# Patient Record
Sex: FEMALE | Race: WHITE | Employment: OTHER | URBAN - METROPOLITAN AREA
[De-identification: names, ages, dates, MRNs, and addresses within clinical notes are randomized per-mention and may not be internally consistent; named-entity substitution may affect disease eponyms.]

---

## 2017-01-23 DIAGNOSIS — M81.0 OSTEOPOROSIS: ICD-10-CM

## 2017-01-23 NOTE — TELEPHONE ENCOUNTER
Requested Prescriptions     Pending Prescriptions Disp Refills    alendronate (FOSAMAX) 70 mg tablet 4 Tab 1     Sig: Take 1 Tab by mouth every seven (7) days.  calcium-cholecalciferol, d3, 600 mg calcium- 200 unit cap 60 Cap 11     Sig: One tablet by mouth twice daily. Completely out. Patient is requesting a 90 day supply.

## 2017-01-23 NOTE — TELEPHONE ENCOUNTER
Return call made to Pt using two identifiers. Pt made aware that she has refills remaining  on the Calcium-Choleclciferol at [facility] and the Fosamax I will pend to the provider. Pt stated it can wait until tomorrow and wanted that sent to [facility] because it was cheaper there.

## 2017-01-24 RX ORDER — ALENDRONATE SODIUM 70 MG/1
70 TABLET ORAL
Qty: 4 TAB | Refills: 1 | OUTPATIENT
Start: 2017-01-24

## 2017-01-24 NOTE — TELEPHONE ENCOUNTER
1/24/2017  3:52 PM    Chief Complaint   Patient presents with    Medication Refill       Noted request for Calcium with Vit D and Fosamax. Year supply of Calcium with Vit D sent 6/2016- no further action required- LPN informed patient. Fosamax sent in for month supply with refill on 1/4/2017. Called [facility] at this time and LM informing pharmacist that Rx was sent 1/4 with a refill. Requested call back from pharmacist if there is an issue with the medication Rx. Requested that pharmacist fill Rx if not an issue.

## 2017-01-25 ENCOUNTER — PATIENT OUTREACH (OUTPATIENT)
Dept: FAMILY MEDICINE CLINIC | Age: 72
End: 2017-01-25

## 2017-01-25 ENCOUNTER — TELEPHONE (OUTPATIENT)
Dept: FAMILY MEDICINE CLINIC | Age: 72
End: 2017-01-25

## 2017-01-25 DIAGNOSIS — Z12.11 COLON CANCER SCREENING: Primary | ICD-10-CM

## 2017-01-25 NOTE — ACP (ADVANCE CARE PLANNING)
NN Screening Reminders:    Spoke with Mrs. Bhumi Castro regarding colonoscopy screening. Patient freely admitted she has never had colonoscopy but agrees it is time to be screened. I have placed an order to [facility] office and Prudence Boyce has received the order. Mrs. Bhumi Castro had no questions @ this time. I did explain that it may be February or March before she is contacted for the initial visit and she agreed that was fine.

## 2017-02-01 ENCOUNTER — TELEPHONE (OUTPATIENT)
Dept: FAMILY MEDICINE CLINIC | Age: 72
End: 2017-02-01

## 2017-02-01 DIAGNOSIS — M81.0 OSTEOPOROSIS: ICD-10-CM

## 2017-02-01 DIAGNOSIS — E11.9 TYPE 2 DIABETES MELLITUS WITHOUT COMPLICATION, WITHOUT LONG-TERM CURRENT USE OF INSULIN (HCC): ICD-10-CM

## 2017-02-01 DIAGNOSIS — I10 ESSENTIAL HYPERTENSION: Primary | ICD-10-CM

## 2017-02-02 ENCOUNTER — OFFICE VISIT (OUTPATIENT)
Dept: FAMILY MEDICINE CLINIC | Age: 72
End: 2017-02-02

## 2017-02-02 VITALS
OXYGEN SATURATION: 97 % | BODY MASS INDEX: 20.66 KG/M2 | DIASTOLIC BLOOD PRESSURE: 64 MMHG | HEIGHT: 64 IN | WEIGHT: 121 LBS | SYSTOLIC BLOOD PRESSURE: 127 MMHG | RESPIRATION RATE: 20 BRPM | TEMPERATURE: 98.3 F | HEART RATE: 96 BPM

## 2017-02-02 DIAGNOSIS — M81.0 OSTEOPOROSIS: ICD-10-CM

## 2017-02-02 DIAGNOSIS — Z12.12 SCREENING FOR COLORECTAL CANCER: ICD-10-CM

## 2017-02-02 DIAGNOSIS — Z12.11 SCREENING FOR COLORECTAL CANCER: ICD-10-CM

## 2017-02-02 DIAGNOSIS — I10 ESSENTIAL HYPERTENSION WITH GOAL BLOOD PRESSURE LESS THAN 130/80: Primary | ICD-10-CM

## 2017-02-02 DIAGNOSIS — E11.9 TYPE 2 DIABETES MELLITUS WITHOUT COMPLICATION, WITHOUT LONG-TERM CURRENT USE OF INSULIN (HCC): ICD-10-CM

## 2017-02-02 RX ORDER — GLIPIZIDE 5 MG/1
5 TABLET ORAL DAILY
Qty: 90 TAB | Refills: 1 | Status: SHIPPED | OUTPATIENT
Start: 2017-02-02 | End: 2017-08-16 | Stop reason: SDUPTHER

## 2017-02-02 RX ORDER — METFORMIN HYDROCHLORIDE 1000 MG/1
1000 TABLET ORAL 2 TIMES DAILY WITH MEALS
Qty: 180 TAB | Refills: 1 | Status: SHIPPED | OUTPATIENT
Start: 2017-02-02 | End: 2017-08-16 | Stop reason: SDUPTHER

## 2017-02-02 RX ORDER — LOSARTAN POTASSIUM 25 MG/1
25 TABLET ORAL DAILY
Qty: 90 TAB | Refills: 1 | Status: SHIPPED | OUTPATIENT
Start: 2017-02-02 | End: 2017-08-16 | Stop reason: SDUPTHER

## 2017-02-02 NOTE — PROGRESS NOTES
HISTORY OF PRESENT ILLNESS  Dari Pham is a 66 y.o. female. 2/2/2017  10:08 AM    Chief Complaint   Patient presents with    Follow Up Chronic Condition     pt here for follow up chronic condition DM, HTN, Osetoarthritis       HPI: Here today for follow up on chronic conditions. No recent labs done. Follows with eye doctor. HTN: Taking Losartan as prescribed. Does not check BP at home regularly. No complaints of headaches, dizziness, chest pain, or leg swelling. Diabetes: Checking sugars at home- reports glucose <120 in the AM and around 150 during meal times. Currently on Glipizide and Metformin. Does attempt to eat ADA diet at home. No LDL on file. Not on statin, is on ACE. Does not follow with endocrine. Does follow with optometry. Last HgBA1c controlled. Osteoporosis: Has been prescribed Fosamax in the past- recently has been set up for Prolia injections instead- first injection set for 2/27. Reports bone and muscle pain with Fosamax. Taking Calcium and Vit D. Review of Systems   Constitutional: Negative for chills, fever and malaise/fatigue. Respiratory: Negative for cough, shortness of breath and wheezing. Cardiovascular: Negative for chest pain, palpitations and leg swelling. Gastrointestinal: Negative for abdominal pain, diarrhea, nausea and vomiting. Neurological: Negative for dizziness and headaches.         PHQ Screening   PHQ 2 / 9, over the last two weeks 2/2/2017   Little interest or pleasure in doing things Not at all   Feeling down, depressed or hopeless Not at all   Total Score PHQ 2 0         History  Past Medical History   Diagnosis Date    Diabetes ([facility] [address]     Hypertension     Macular degeneration 9/2015    Osteoporosis 9/2015     on Fosamax       Past Surgical History   Procedure Laterality Date    Hx refractive surgery  12/2016     right eye       Social History     Social History    Marital status:      Spouse name: N/A    Number of children: N/A  Years of education: N/A     Occupational History    Not on file. Social History Main Topics    Smoking status: Never Smoker    Smokeless tobacco: Not on file    Alcohol use No    Drug use: No    Sexual activity: Not Currently     Other Topics Concern    Not on file     Social History Narrative       Family History   Problem Relation Age of Onset    Heart Disease Mother     Heart Disease Father        No Known Allergies    Current Outpatient Prescriptions   Medication Sig Dispense Refill    alendronate (FOSAMAX) 70 mg tablet Take 1 Tab by mouth every seven (7) days. 4 Tab 1    metFORMIN (GLUCOPHAGE) 1,000 mg tablet TAKE 1 TABLET BY MOUTH TWICE DAILY WITH MEALS 180 Tab 0    losartan (COZAAR) 25 mg tablet Take 1 Tab by mouth daily. 90 Tab 0    glipiZIDE (GLUCOTROL) 5 mg tablet Take 1 Tab by mouth daily. 90 Tab 0    calcium-cholecalciferol, d3, 600 mg calcium- 200 unit cap One tablet by mouth twice daily. 60 Cap 11    denosumab (PROLIA) 60 mg/mL injection 1 mL by SubCUTAneous route every 6 months. To be done at [facility]. 1 Syringe 1    OTHER Glucometer kit, meter, strips, lancets, alcohol wipes. Check and record glucose before meals and before bedtime. 1 Each 0       Health Maintenance and Screenings  *Medicare Wellness 11/2/2016    Updates:     Colon Cancer: Scheduled for colonoscopy nurse visit during today's office visit- scheduled for 2/21 at [facility]:   Patient was offered the opportunity to discuss advance care planning NO   Does patient have an Advance Directive:  NO   If no, did you provide information on Caring Connections? Patient Care Team:  Patient Care Team:  Lazarus Galvan NP as PCP - General (Nurse Practitioner)  Mason Ngo MD (Ophthalmology)        LABS:  None new to review    RADIOLOGY:  None new to review      Physical Exam   Constitutional: She is oriented to person, place, and time.  She appears well-developed and well-nourished. No distress. Cardiovascular: Normal rate, regular rhythm and normal heart sounds. No murmur heard. Pulmonary/Chest: Effort normal and breath sounds normal. No respiratory distress. Abdominal: Soft. Bowel sounds are normal. There is no tenderness. Neurological: She is alert and oriented to person, place, and time. Skin: Skin is warm and dry. Diabetic foot exam: 2+ symmetric DP, no open areas or skin breakdown noted, sensory intact to filament and positioning. Vibratory sensation not tested. Nails in good condition. Vitals:    02/02/17 0953   BP: 127/64   Pulse: 96   Resp: 20   Temp: 98.3 °F (36.8 °C)   TempSrc: Oral   SpO2: 97%   Weight: 121 lb (54.9 kg)   Height: 5' 4\" (1.626 m)   PainSc:   9   PainLoc: Generalized         ASSESSMENT and Tereza Zacarias was seen today for follow up chronic condition. Diagnoses and all orders for this visit:    Essential hypertension with goal blood pressure less than 130/80  *Continue current medication. Controlled. Labs ordered previously- encouraged to have done. -     losartan (COZAAR) 25 mg tablet; Take 1 Tab by mouth daily. Type 2 diabetes mellitus without complication, without long-term current use of insulin ([facility])  *Continue current medication. Controlled A1c. Encouraged to have labs done as previously ordered- add Microalbumin to labs. -     metFORMIN (GLUCOPHAGE) 1,000 mg tablet; Take 1 Tab by mouth two (2) times daily (with meals). -     glipiZIDE (GLUCOTROL) 5 mg tablet; Take 1 Tab by mouth daily.  -     MICROALBUMIN, UR, RAND W/ MICROALBUMIN/CREA RATIO; Future  -      DIABETES FOOT EXAM    Osteoporosis  *Continue with Prolia injection appointment. Has been discussed previously. Screening for colorectal cancer  *While patient in office, appointment made for colonoscopy nurse appointment. Scheduled for 2/21. *Plan of care reviewed with patient.  Patient in agreement with plan and expresses understanding. All questions answered and patient encouraged to call or RTO if further questions or concerns. Follow-up Disposition:  Return in about 3 months (around 5/2/2017) for chronic disease followup.

## 2017-02-02 NOTE — MR AVS SNAPSHOT
Visit Information Date & Time Provider Department Dept. Phone Encounter #  
 2/2/2017  9:15 AM TOBY Barrow [phone] [id number] Your Appointments 2/21/2017 10:00 AM  
COLON SCREEN with [facility] NURSE VISIT [facility] ([address]) Appt Note: NP Hung referrfing [phone]; NP [place] [phone]  
 [address] [facility] [address] [place] [address] [facility] [address] [place] [address]  
  
    
 11/8/2017 10:30 AM  
Office Visit with TOBY Barrow ([address]) Appt Note: Peri Barbosa [phone]  
[facility] [id number] Upcoming Health Maintenance Date Due COLONOSCOPY 2/15/1962 DTaP/Tdap/Td series (1 - Tdap) 2/15/1965 ZOSTER VACCINE AGE 60> 2/15/2004 FOOT EXAM Q1 4/21/2016 MICROALBUMIN Q1 9/9/2016 LIPID PANEL Q1 9/9/2016 EYE EXAM RETINAL OR DILATED Q1 9/11/2016 HEMOGLOBIN A1C Q6M 5/2/2017 GLAUCOMA SCREENING Q2Y 9/11/2017 BREAST CANCER SCRN MAMMOGRAM 9/15/2017 MEDICARE YEARLY EXAM 11/3/2017 Allergies as of 2/2/2017  Review Complete On: 2/2/2017 By: Anjelica Robles NP No Known Allergies Current Immunizations  Reviewed on 9/18/2015 Name Date Influenza High Dose Vaccine PF 11/2/2016 Influenza Vaccine 9/18/2015 Pneumococcal Conjugate (PCV-13) 11/2/2016 Pneumococcal Polysaccharide (PPSV-23) 4/17/2015  3:28 PM  
  
 Not reviewed this visit You Were Diagnosed With   
  
 Codes Comments Type 2 diabetes mellitus without complication, without long-term current use of insulin (HCC)     ICD-10-CM: E11.9 ICD-9-CM: 250.00 Essential hypertension with goal blood pressure less than 130/80     ICD-10-CM: I10 
ICD-9-CM: 401.9 Vitals BP Pulse Temp Resp Height(growth percentile) Weight(growth percentile) 127/64 (BP 1 Location: Right arm, BP Patient Position: Sitting) 96 98.3 °F (36.8 °C) (Oral) 20 5' 4\" (1.626 m) 121 lb (54.9 kg) SpO2 BMI OB Status Smoking Status 97% 20.77 kg/m2 Menopause Never Smoker Vitals History BMI and BSA Data Body Mass Index Body Surface Area 20.77 kg/m 2 1.57 m 2 Preferred Pharmacy Pharmacy Name Phone [facility] [address] [facility] [phone] Your Updated Medication List  
  
   
This list is accurate as of: 2/2/17 10:17 AM.  Always use your most recent med list.  
  
  
  
  
 alendronate 70 mg tablet Commonly known as:  FOSAMAX Take 1 Tab by mouth every seven (7) days. calcium-cholecalciferol (d3) 600 mg calcium- 200 unit Cap One tablet by mouth twice daily. denosumab 60 mg/mL injection Commonly known as:  Héctor Grit 1 mL by SubCUTAneous route every 6 months. To be done at [facility]. glipiZIDE 5 mg tablet Commonly known as:  Matt Silverman Take 1 Tab by mouth daily. losartan 25 mg tablet Commonly known as:  COZAAR Take 1 Tab by mouth daily. metFORMIN 1,000 mg tablet Commonly known as:  GLUCOPHAGE Take 1 Tab by mouth two (2) times daily (with meals). OTHER Glucometer kit, meter, strips, lancets, alcohol wipes. Check and record glucose before meals and before bedtime. Prescriptions Sent to Pharmacy Refills  
 metFORMIN (GLUCOPHAGE) 1,000 mg tablet 1 Sig: Take 1 Tab by mouth two (2) times daily (with meals). Class: Normal  
 Pharmacy: [address] Ph #: [phone] Route: Oral  
 losartan (COZAAR) 25 mg tablet 1 Sig: Take 1 Tab by mouth daily. Class: Normal  
 Pharmacy: [address] Ph #: [phone] Route: Oral  
 glipiZIDE (GLUCOTROL) 5 mg tablet 1 Sig: Take 1 Tab by mouth daily. Class: Normal  
 Pharmacy: [facility] [address] [facility], [address] [place] [address] #: [phone] Route: Oral  
  
We Performed the Following  DIABETES FOOT EXAM [7 Custom] To-Do List   
 02/03/2017 Lab:  MICROALBUMIN, UR, RAND W/ MICROALBUMIN/CREA RATIO   
  
 02/27/2017 11:00 AM  
  Appointment with [facility] INF[facility]ION NURSE 1 at [facility]  
  
 08/28/2017 11:00 AM  
  Appointment with [facility] INFUSION NURSE 1 at [facility] Patient Instructions Colonoscopy-  colorectal cancer [facility] [address] [place], [place], [address] Phone: [phone] [facility] 1948 [address] [facility], [address] [place], [place] [address] also locations at [place] [address] Phone: [phone] Introducing [facility]! [facility] introduces [employer] patient portal. Now you can access parts of your medical record, email your doctor's office, and request medication refills online. 1. In your internet browser, go to [email or web address] 2. Click on the First Time User? Click Here link in the Sign In box. You will see the New Member Sign Up page. 3. Enter your [employer] Access Code exactly as it appears below. You will not need to use this code after youve completed the sign-up process. If you do not sign up before the expiration date, you must request a new code. · [employer] Access Code: [email or web address] Expires: 5/2/2017  2:04 PM 
 
4. Enter the last four digits of your Social Security Number (xxxx) and Date of Birth (mm/dd/yyyy) as indicated and click Submit. You will be taken to the next sign-up page. 5. Create a [employer] ID. This will be your [employer] login ID and cannot be changed, so think of one that is secure and easy to remember. 6. Create a [employer] password. You can change your password at any time. 7. Enter your Password Reset Question and Answer. This can be used at a later time if you forget your password. 8. Enter your e-mail address. You will receive e-mail notification when new information is available in [address] 9. Click Sign Up. You can now view and download portions of your medical record. 10. Click the Download Summary menu link to download a portable copy of your medical information. If you have questions, please visit the Frequently Asked Questions section of the [employer] website. Remember, [employer] is NOT to be used for urgent needs. For medical emergencies, dial 911. Now available from your iPhone and Android! Please provide this summary of care documentation to your next provider. Your primary care clinician is listed as Osiris Ruelas. If you have any questions after today's visit, please call [phone].

## 2017-02-06 ENCOUNTER — HOSPITAL ENCOUNTER (OUTPATIENT)
Dept: LAB | Age: 72
Discharge: HOME | End: 2017-02-06
Payer: MEDICARE

## 2017-02-06 DIAGNOSIS — M81.0 OSTEOPOROSIS: ICD-10-CM

## 2017-02-06 DIAGNOSIS — E11.9 TYPE 2 DIABETES MELLITUS WITHOUT COMPLICATION, WITHOUT LONG-TERM CURRENT USE OF INSULIN (HCC): ICD-10-CM

## 2017-02-06 DIAGNOSIS — I10 ESSENTIAL HYPERTENSION: ICD-10-CM

## 2017-02-06 LAB
ALBUMIN SERPL BCP-MCNC: 3.9 G/DL (ref 3.4–5)
ALBUMIN/GLOB SERPL: 1.3 {RATIO} (ref 0.8–1.7)
ALP SERPL-CCNC: 52 U/L (ref 45–117)
ALT SERPL-CCNC: 20 U/L (ref 13–56)
ANION GAP BLD CALC-SCNC: 7 MMOL/L (ref 3–18)
AST SERPL W P-5'-P-CCNC: 10 U/L (ref 15–37)
BILIRUB SERPL-MCNC: 0.4 MG/DL (ref 0.2–1)
BUN SERPL-MCNC: 22 MG/DL (ref 7–18)
BUN/CREAT SERPL: 22 (ref 12–20)
CALCIUM SERPL-MCNC: 8.7 MG/DL (ref 8.5–10.1)
CHLORIDE SERPL-SCNC: 107 MMOL/L (ref 100–108)
CHOLEST SERPL-MCNC: 195 MG/DL
CO2 SERPL-SCNC: 28 MMOL/L (ref 21–32)
CREAT SERPL-MCNC: 0.98 MG/DL (ref 0.6–1.3)
CREAT UR-MCNC: 81.4 MG/DL (ref 30–125)
GLOBULIN SER CALC-MCNC: 3 G/DL (ref 2–4)
GLUCOSE SERPL-MCNC: 118 MG/DL (ref 74–99)
HDLC SERPL-MCNC: 37 MG/DL (ref 40–60)
HDLC SERPL: 5.3 {RATIO} (ref 0–5)
LDLC SERPL CALC-MCNC: 88.6 MG/DL (ref 0–100)
LIPID PROFILE,FLP: ABNORMAL
MICROALBUMIN UR-MCNC: 4.01 MG/DL (ref 0–3)
MICROALBUMIN/CREAT UR-RTO: 49 MG/G (ref 0–30)
POTASSIUM SERPL-SCNC: 4.7 MMOL/L (ref 3.5–5.5)
PROT SERPL-MCNC: 6.9 G/DL (ref 6.4–8.2)
SODIUM SERPL-SCNC: 142 MMOL/L (ref 136–145)
TRIGL SERPL-MCNC: 347 MG/DL (ref ?–150)
TSH SERPL DL<=0.05 MIU/L-ACNC: 3.63 UIU/ML (ref 0.36–3.74)
VLDLC SERPL CALC-MCNC: 69.4 MG/DL

## 2017-02-06 PROCEDURE — 80053 COMPREHEN METABOLIC PANEL: CPT | Performed by: NURSE PRACTITIONER

## 2017-02-06 PROCEDURE — 84443 ASSAY THYROID STIM HORMONE: CPT | Performed by: NURSE PRACTITIONER

## 2017-02-06 PROCEDURE — 82652 VIT D 1 25-DIHYDROXY: CPT | Performed by: NURSE PRACTITIONER

## 2017-02-06 PROCEDURE — 82043 UR ALBUMIN QUANTITATIVE: CPT | Performed by: NURSE PRACTITIONER

## 2017-02-06 PROCEDURE — 36415 COLL VENOUS BLD VENIPUNCTURE: CPT | Performed by: NURSE PRACTITIONER

## 2017-02-06 PROCEDURE — 80061 LIPID PANEL: CPT | Performed by: NURSE PRACTITIONER

## 2017-02-07 LAB — 1,25(OH)2D3 SERPL-MCNC: 34.5 PG/ML (ref 19.9–79.3)

## 2017-02-09 ENCOUNTER — PATIENT OUTREACH (OUTPATIENT)
Dept: FAMILY MEDICINE CLINIC | Age: 72
End: 2017-02-09

## 2017-02-09 DIAGNOSIS — E11.9 TYPE 2 DIABETES MELLITUS WITHOUT COMPLICATION (HCC): ICD-10-CM

## 2017-02-09 RX ORDER — METFORMIN HYDROCHLORIDE 1000 MG/1
TABLET ORAL
Qty: 60 TAB | OUTPATIENT
Start: 2017-02-09

## 2017-02-09 NOTE — PROGRESS NOTES
NN Health Screenings:    Verified patient has appointment 2/21/17 with nurse @ Dr. Estevez Samuel office. Mammogram not due till September of this year.

## 2017-02-10 ENCOUNTER — TELEPHONE (OUTPATIENT)
Dept: FAMILY MEDICINE CLINIC | Age: 72
End: 2017-02-10

## 2017-02-10 DIAGNOSIS — M81.0 OSTEOPOROSIS: ICD-10-CM

## 2017-02-10 RX ORDER — CALCIUM CARBONATE/VITAMIN D3 500 MG-10
1 TABLET ORAL 2 TIMES DAILY
Qty: 180 TAB | Refills: 4 | Status: SHIPPED | OUTPATIENT
Start: 2017-02-10

## 2017-02-10 RX ORDER — CALCIUM CARBONATE/VITAMIN D3 500 MG-10
1 TABLET ORAL 2 TIMES DAILY
Qty: 180 TAB | Refills: 4 | OUTPATIENT
Start: 2017-02-10

## 2017-02-10 NOTE — TELEPHONE ENCOUNTER
Requested Prescriptions     Pending Prescriptions Disp Refills    calcium-cholecalciferol, d3, 600 mg calcium- 200 unit cap 60 Cap 11     Sig: One tablet by mouth twice daily.

## 2017-02-10 NOTE — TELEPHONE ENCOUNTER
2/10/2017  1:20 PM    Chief Complaint   Patient presents with    Other       Noted request for Calcium. Called patient at this time- clarified that she is needing a refill on her Calcium + D. Refilled at this time.

## 2017-02-24 ENCOUNTER — TELEPHONE (OUTPATIENT)
Dept: FAMILY MEDICINE CLINIC | Age: 72
End: 2017-02-24

## 2017-02-24 NOTE — TELEPHONE ENCOUNTER
Patient called stating she have questions in reference to her bone medication(she didn't know the name).  She can be reached at [phone]

## 2017-02-27 ENCOUNTER — HOSPITAL ENCOUNTER (OUTPATIENT)
Dept: INFUSION THERAPY | Age: 72
Discharge: HOME | End: 2017-02-27
Payer: MEDICARE

## 2017-02-27 VITALS
OXYGEN SATURATION: 96 % | RESPIRATION RATE: 18 BRPM | HEART RATE: 85 BPM | SYSTOLIC BLOOD PRESSURE: 176 MMHG | TEMPERATURE: 98.2 F | DIASTOLIC BLOOD PRESSURE: 82 MMHG

## 2017-02-27 LAB
ANION GAP BLD CALC-SCNC: 19 MMOL/L (ref 10–20)
BUN BLD-MCNC: 31 MG/DL (ref 7–18)
CA-I BLD-MCNC: 1.2 MMOL/L (ref 1.12–1.32)
CHLORIDE BLD-SCNC: 103 MMOL/L (ref 100–108)
CO2 BLD-SCNC: 25 MMOL/L (ref 19–24)
CREAT UR-MCNC: 1.1 MG/DL (ref 0.6–1.3)
GLUCOSE BLD STRIP.AUTO-MCNC: 121 MG/DL (ref 74–106)
HCT VFR BLD CALC: 37 % (ref 36–49)
HGB BLD-MCNC: 12.6 G/DL (ref 12–16)
POTASSIUM BLD-SCNC: 4.2 MMOL/L (ref 3.5–5.5)
SODIUM BLD-SCNC: 142 MMOL/L (ref 136–145)

## 2017-02-27 PROCEDURE — 74011250636 HC RX REV CODE- 250/636: Performed by: NURSE PRACTITIONER

## 2017-02-27 PROCEDURE — 96372 THER/PROPH/DIAG INJ SC/IM: CPT

## 2017-02-27 PROCEDURE — 80047 BASIC METABLC PNL IONIZED CA: CPT

## 2017-02-27 RX ADMIN — DENOSUMAB 60 MG: 60 INJECTION SUBCUTANEOUS at 11:29

## 2017-02-27 NOTE — PROGRESS NOTES
[facility] Progress Note    Date: 2017    Name: Josey Perera    MRN: [id number]         : 1945     PROLIA INJECTION      Ms. Darrell Swanson to [facility], ambulatory at 1105. Pt was assessed and education was provided. Ms. Lianna Rodas vitals were reviewed and patient was observed for 5 minutes prior to treatment. Visit Vitals    /82 (BP 1 Location: Left arm, BP Patient Position: Sitting)    Pulse 85    Temp 98.2 °F (36.8 °C)    Resp 18    SpO2 96%    Breastfeeding No       Blood obtained peripherally from the right AC without difficulty and BMP run per written orders. No bleeding or hematoma noted at site. Guaze and tape applied. Lab results were obtained and reviewed. Recent Results (from the past 12 hour(s))   POC CHEM8    Collection Time: 17 11:24 AM   Result Value Ref Range    CO2 (POC) 25 (H) 19 - 24 MMOL/L    Glucose (POC) 121 (H) 74 - 106 MG/DL    BUN (POC) 31 (H) 7 - 18 MG/DL    Creatinine (POC) 1.1 0.6 - 1.3 MG/DL    GFR-AA (POC) 59 (L) >60 ml/min/1.73m2    GFR, non-AA (POC) 49 (L) >60 ml/min/1.73m2    Sodium (POC) 142 136 - 145 MMOL/L    Potassium (POC) 4.2 3.5 - 5.5 MMOL/L    Calcium, ionized (POC) 1.20 1. 12 - 1.32 MMOL/L    Chloride (POC) 103 100 - 108 MMOL/L    Anion gap (POC) 19 10 - 20      Hematocrit (POC) 37 36 - 49 %    Hemoglobin (POC) 12.6 12 - 16 G/DL     Results within ordered parameters to give PROLIA today. Prolia 60 mg was administered subcutaneous in the back of the right arm. No irritation or bleeding noted at site. Bandaid applied. Pt was observed for 30 minutes post injection. No reaction was noted. Ms. Darrell Swanson tolerated well, and had no complaints. Patient armband removed and shredded. Ms. Darrell Swanson was discharged from [place] [address] in stable condition at 1200. She is to return on 2017 at 11 for her next Prolia appointment.     Alanna Baker RN  2017

## 2017-02-28 ENCOUNTER — PATIENT OUTREACH (OUTPATIENT)
Dept: FAMILY MEDICINE CLINIC | Age: 72
End: 2017-02-28

## 2017-03-16 DIAGNOSIS — I10 ESSENTIAL HYPERTENSION WITH GOAL BLOOD PRESSURE LESS THAN 130/80: ICD-10-CM

## 2017-03-16 DIAGNOSIS — E11.9 TYPE 2 DIABETES MELLITUS WITHOUT COMPLICATION (HCC): ICD-10-CM

## 2017-03-17 ENCOUNTER — OFFICE VISIT (OUTPATIENT)
Dept: SURGERY | Age: 72
End: 2017-03-17

## 2017-03-17 VITALS
OXYGEN SATURATION: 95 % | HEIGHT: 64 IN | BODY MASS INDEX: 21.08 KG/M2 | WEIGHT: 123.46 LBS | TEMPERATURE: 98.1 F | RESPIRATION RATE: 16 BRPM | HEART RATE: 90 BPM

## 2017-03-17 DIAGNOSIS — Z12.11 COLON CANCER SCREENING: Primary | ICD-10-CM

## 2017-03-17 RX ORDER — GLIPIZIDE 5 MG/1
TABLET ORAL
Qty: 30 TAB | Refills: 0 | OUTPATIENT
Start: 2017-03-17

## 2017-03-17 RX ORDER — METFORMIN HYDROCHLORIDE 1000 MG/1
TABLET ORAL
Qty: 60 TAB | OUTPATIENT
Start: 2017-03-17

## 2017-03-17 RX ORDER — LOSARTAN POTASSIUM 25 MG/1
TABLET ORAL
Qty: 30 TAB | Refills: 0 | OUTPATIENT
Start: 2017-03-17

## 2017-03-17 NOTE — PROGRESS NOTES
Review of Systems   Constitutional: Positive for malaise/fatigue. Negative for chills, diaphoresis, fever and weight loss. HENT: Negative. Eyes: Positive for blurred vision and double vision. Negative for photophobia, pain, discharge and redness. Macular degeneration   Respiratory: Negative. Cardiovascular: Negative. Gastrointestinal: Positive for heartburn. Negative for abdominal pain, blood in stool, constipation, diarrhea, melena, nausea and vomiting. Loose stool due to medication   Genitourinary: Negative. Musculoskeletal: Positive for joint pain and neck pain. Negative for back pain, falls and myalgias. Skin: Negative. Neurological: Positive for tingling. Negative for dizziness, tremors, sensory change, speech change, focal weakness, seizures, loss of consciousness and weakness. Left pointer finger   Endo/Heme/Allergies: Negative. Psychiatric/Behavioral: Negative. Colon Screen    Patient: Ottoniel Rueda MRN: [id number]  SSN: [id number]    YOB: 1945  Age: 67 y.o. Sex: female        Subjective: Ottoniel Rueda was referred by her PCP, Danny Hennessy NP. Patient referred for colonoscopy for   Screening colonoscopy. Patient denies rectal pain or bleeding. Abdominal surgeries as described below, specifically none. Family history as described below, specifically none. Patient has never had a colonoscopy.     No Known Allergies    Past Medical History:   Diagnosis Date    Diabetes ([address]     Hypertension     Macular degeneration 9/2015    Osteoporosis 09/2015    on Prolia starting 2/2017     Past Surgical History:   Procedure Laterality Date    HX REFRACTIVE SURGERY  12/2016    right eye      Family History   Problem Relation Age of Onset    Heart Disease Mother     Heart Disease Father      Social History   Substance Use Topics    Smoking status: Never Smoker    Smokeless tobacco: Not on file    Alcohol use No      Prior to Admission medications Medication Sig Start Date End Date Taking? Authorizing Provider   Calcium-Cholecalciferol, D3, (CALCIUM 500 + D) 500 mg(1,250mg) -400 unit tab Take 1 Tab by mouth two (2) times a day. 2/10/17  Yes Rosy Alba NP   metFORMIN (GLUCOPHAGE) 1,000 mg tablet Take 1 Tab by mouth two (2) times daily (with meals). 2/2/17  Yes Rosy Alba NP   losartan (COZAAR) 25 mg tablet Take 1 Tab by mouth daily. 2/2/17  Yes Rosy Alba NP   glipiZIDE (GLUCOTROL) 5 mg tablet Take 1 Tab by mouth daily. 2/2/17  Yes Rosy Alba NP   denosumab (PROLIA) 60 mg/mL injection 1 mL by SubCUTAneous route every 6 months. To be done at [facility]. 2/1/17  Yes Rosy Alba NP   OTHER Glucometer kit, meter, strips, lancets, alcohol wipes. Check and record glucose before meals and before bedtime. 4/21/15  Yes Donnie Flores NP          Review of Systems:      Risks colonoscopy described- colon injury, missed lesion, anesthesia problems, bleeding       Alisa Larsen, LPN  March 17, 9568  9:43 AM

## 2017-03-17 NOTE — TELEPHONE ENCOUNTER
3/17/2017  5:36 PM    Chief Complaint   Patient presents with    Medication Refill       Noted refill request from [employer] for Metformin, Glipizide, and Losartan. Refills were just sent for 90 days supply with 1 refill on 2/2/2017. No further action needed.

## 2017-03-17 NOTE — MR AVS SNAPSHOT
Visit Information Date & Time Provider Department Dept. Phone Encounter #  
 3/17/2017  9:30 AM TSS [facility] NURSE VISIT [facility] [phone] [id number] Your Appointments 5/2/2017  9:45 AM  
FOLLOW UP EXAM with Reina Oh NP [facility], located within [facility] ([address]) Appt Note: 3 month f/u  
 [address] [facility] [address]  
[facility] [address]  
  
    
 11/8/2017 10:30 AM  
Office Visit with Reina Oh NP [facility] located [facility] ([address]) Appt Note: [address] [facility] [address]  
[facility] [address] Upcoming Health Maintenance Date Due COLONOSCOPY 2/15/1962 DTaP/Tdap/Td series (1 - Tdap) 2/15/1965 ZOSTER VACCINE AGE 60> 2/15/2004 EYE EXAM RETINAL OR DILATED Q1 9/11/2016 HEMOGLOBIN A1C Q6M 5/2/2017 GLAUCOMA SCREENING Q2Y 9/11/2017 BREAST CANCER SCRN MAMMOGRAM 9/15/2017 MEDICARE YEARLY EXAM 11/3/2017 FOOT EXAM Q1 2/2/2018 MICROALBUMIN Q1 2/6/2018 LIPID PANEL Q1 2/6/2018 Allergies as of 3/17/2017  Review Complete On: 2/27/2017 By: Lety Zhong RN No Known Allergies Current Immunizations  Reviewed on 9/18/2015 Name Date Influenza High Dose Vaccine PF 11/2/2016 Influenza Vaccine 9/18/2015 Pneumococcal Conjugate (PCV-13) 11/2/2016 Pneumococcal Polysaccharide (PPSV-23) 4/17/2015  3:28 PM  
  
 Not reviewed this visit Vitals Pulse Temp Resp Height(growth percentile) Weight(growth percentile) SpO2  
 90 98.1 °F (36.7 °C) (Oral) 16 5' 4\" (1.626 m) 123 lb 7.3 oz (56 kg) 95% BMI OB Status Smoking Status 21.19 kg/m2 Menopause Never Smoker Vitals History BMI and BSA Data Body Mass Index Body Surface Area  
 21.19 kg/m 2 1.59 m 2 Preferred Pharmacy Pharmacy Name Phone [facility]ahliltopher Fiorella [facility] [address], [address] [phone] Your Updated Medication List  
  
   
This list is accurate as of: 3/17/17  9:43 AM.  Always use your most recent med list.  
  
  
  
  
 Calcium-Cholecalciferol (D3) 500 mg(1,250mg) -400 unit Tab Commonly known as:  CALCIUM 500 + D Take 1 Tab by mouth two (2) times a day. denosumab 60 mg/mL injection Commonly known as:  Monik Huber 1 mL by SubCUTAneous route every 6 months. To be done at [facility]. glipiZIDE 5 mg tablet Commonly known as:  Maxcine Bright Take 1 Tab by mouth daily. losartan 25 mg tablet Commonly known as:  COZAAR Take 1 Tab by mouth daily. metFORMIN 1,000 mg tablet Commonly known as:  GLUCOPHAGE Take 1 Tab by mouth two (2) times daily (with meals). OTHER Glucometer kit, meter, strips, lancets, alcohol wipes. Check and record glucose before meals and before bedtime. To-Do List   
 08/28/2017 11:00 AM  
  Appointment with [facility] NURSE 1 at [facility] ([phone]) Introducing [facility] & HEALTH SERVICES! Lucia Hammonds introduces [employer] patient portal. Now you can access parts of your medical record, email your doctor's office, and request medication refills online. 1. In your internet browser, go to [email or web address] 2. Click on the First Time User? Click Here link in the Sign In box. You will see the New Member Sign Up page. 3. Enter your [employer] Access Code exactly as it appears below. You will not need to use this code after youve completed the sign-up process. If you do not sign up before the expiration date, you must request a new code. · [employer] Access Code: [email or web address] Expires: 5/2/2017  3:04 PM 
 
4. Enter the last four digits of your Social Security Number (xxxx) and Date of Birth (mm/dd/yyyy) as indicated and click Submit.  You will be taken to the next sign-up page. 5. Create a [facility] ID. This will be your [facility] login ID and cannot be changed, so think of one that is secure and easy to remember. 6. Create a [facility] password. You can change your password at any time. 7. Enter your Password Reset Question and Answer. This can be used at a later time if you forget your password. 8. Enter your e-mail address. You will receive e-mail notification when new information is available in [address] 9. Click Sign Up. You can now view and download portions of your medical record. 10. Click the Download Summary menu link to download a portable copy of your medical information. If you have questions, please visit the Frequently Asked Questions section of the [facility] website. Remember, [facility] is NOT to be used for urgent needs. For medical emergencies, dial 911. Now available from your iPhone and Android! Please provide this summary of care documentation to your next provider. Your primary care clinician is listed as Tom Vera. If you have any questions after today's visit, please call [phone].

## 2017-03-20 ENCOUNTER — PATIENT OUTREACH (OUTPATIENT)
Dept: FAMILY MEDICINE CLINIC | Age: 72
End: 2017-03-20

## 2017-03-29 NOTE — TELEPHONE ENCOUNTER
2/9/2017  5:40 PM    Chief Complaint   Patient presents with    Medication Refill       Noted Surescript request from Annia De Los Santos for Metformin. Medication filled on 2/2 during last office visit and sent to [facility]. Patient must have switched pharmacies. Refill denied.
1.38

## 2017-05-02 ENCOUNTER — OFFICE VISIT (OUTPATIENT)
Dept: FAMILY MEDICINE CLINIC | Age: 72
End: 2017-05-02

## 2017-05-02 VITALS
HEART RATE: 102 BPM | BODY MASS INDEX: 21 KG/M2 | OXYGEN SATURATION: 96 % | WEIGHT: 123 LBS | TEMPERATURE: 98 F | DIASTOLIC BLOOD PRESSURE: 63 MMHG | RESPIRATION RATE: 18 BRPM | HEIGHT: 64 IN | SYSTOLIC BLOOD PRESSURE: 119 MMHG

## 2017-05-02 DIAGNOSIS — M79.644 FINGER PAIN, RIGHT: ICD-10-CM

## 2017-05-02 DIAGNOSIS — R14.0 ABDOMINAL BLOATING: ICD-10-CM

## 2017-05-02 DIAGNOSIS — I10 ESSENTIAL HYPERTENSION: Primary | ICD-10-CM

## 2017-05-02 DIAGNOSIS — E11.9 TYPE 2 DIABETES MELLITUS WITHOUT COMPLICATION, WITHOUT LONG-TERM CURRENT USE OF INSULIN (HCC): ICD-10-CM

## 2017-05-02 DIAGNOSIS — M81.0 OSTEOPOROSIS, UNSPECIFIED OSTEOPOROSIS TYPE, UNSPECIFIED PATHOLOGICAL FRACTURE PRESENCE: ICD-10-CM

## 2017-05-02 LAB — HBA1C MFR BLD HPLC: 5.6 %

## 2017-05-02 NOTE — MR AVS SNAPSHOT
Visit Information Date & Time Provider Department Dept. Phone Encounter #  
 5/2/2017  9:45 AM Ara Grimaldo [facility] [phone] [id number] Follow-up Instructions Return in about 3 months (around 8/2/2017) for chronic disease followup- 15 min. Your Appointments 11/8/2017 10:30 AM  
Office Visit with Ara Grimaldo NP [facility] ([address]) Appt Note: [place] [phone]  
[place] [address] Upcoming Health Maintenance Date Due COLONOSCOPY 2/15/1962 DTaP/Tdap/Td series (1 - Tdap) 2/15/1965 ZOSTER VACCINE AGE 60> 2/15/2004 EYE EXAM RETINAL OR DILATED Q1 9/11/2016 HEMOGLOBIN A1C Q6M 5/2/2017 INFLUENZA AGE 9 TO ADULT 8/1/2017 GLAUCOMA SCREENING Q2Y 9/11/2017 BREAST CANCER SCRN MAMMOGRAM 9/15/2017 MEDICARE YEARLY EXAM 11/3/2017 FOOT EXAM Q1 2/2/2018 MICROALBUMIN Q1 2/6/2018 LIPID PANEL Q1 2/6/2018 Allergies as of 5/2/2017  Review Complete On: 5/2/2017 By: Ara Grimaldo NP Severity Noted Reaction Type Reactions Aspralum [Aspirin, Buffered] High 05/02/2017   Intolerance Other (comments) Causes abdominal cramping Current Immunizations  Reviewed on 9/18/2015 Name Date Influenza High Dose Vaccine PF 11/2/2016 Influenza Vaccine 9/18/2015 Pneumococcal Conjugate (PCV-13) 11/2/2016 Pneumococcal Polysaccharide (PPSV-23) 4/17/2015  3:28 PM  
  
 Not reviewed this visit You Were Diagnosed With   
  
 Codes Comments Essential hypertension    -  Primary ICD-10-CM: I10 
ICD-9-CM: 401.9 Type 2 diabetes mellitus without complication, without long-term current use of insulin (HCC)     ICD-10-CM: E11.9 ICD-9-CM: 250.00 Osteoporosis, unspecified osteoporosis type, unspecified pathological fracture presence     ICD-10-CM: M81.0 ICD-9-CM: 733.00 Vitals BP Pulse Temp Resp Height(growth percentile) Weight(growth percentile)  
 119/63 (BP 1 Location: Right arm, BP Patient Position: Sitting) (!) 102 98 °F (36.7 °C) (Oral) 18 5' 4\" (1.626 m) 123 lb (55.8 kg) SpO2 BMI OB Status Smoking Status 96% 21.11 kg/m2 Postmenopausal Never Smoker BMI and BSA Data Body Mass Index Body Surface Area  
 21.11 kg/m 2 1.59 m 2 Preferred Pharmacy Pharmacy Name Phone Ayush Solis [address], [address] [facility] [phone] Your Updated Medication List  
  
   
This list is accurate as of: 5/2/17 10:56 AM.  Always use your most recent med list.  
  
  
  
  
 Calcium-Cholecalciferol (D3) 500 mg(1,250mg) -400 unit Tab Commonly known as:  CALCIUM 500 + D Take 1 Tab by mouth two (2) times a day. COQ10 (UBIQUINOL) PO Take  by mouth. denosumab 60 mg/mL injection Commonly known as:  Ezequiel Susan 1 mL by SubCUTAneous route every 6 months. To be done at [facility]. glipiZIDE 5 mg tablet Commonly known as:  Artist Doll Take 1 Tab by mouth daily. losartan 25 mg tablet Commonly known as:  COZAAR Take 1 Tab by mouth daily. metFORMIN 1,000 mg tablet Commonly known as:  GLUCOPHAGE Take 1 Tab by mouth two (2) times daily (with meals). Follow-up Instructions Return in about 3 months (around 8/2/2017) for chronic disease followup- 15 min. To-Do List   
 08/28/2017 11:00 AM  
  Appointment with [facility] NURSE 1 at [facility] ([phone]) Introducing [facility] HE[facility] SERVICES! Bella Holder introduces [email or web address] patient portal. Now you can access parts of your medical record, email your doctor's office, and request medication refills online. 1. In your internet browser, go to [email or web address] 2. Click on the First Time User? Click Here link in the Sign In box.  You will see the New Member Sign Up page. 3. Enter your [employer] Access Code exactly as it appears below. You will not need to use this code after youve completed the sign-up process. If you do not sign up before the expiration date, you must request a new code. · [employer] Access Code: [email or web address] Expires: 5/2/2017  3:04 PM 
 
4. Enter the last four digits of your Social Security Number (xxxx) and Date of Birth (mm/dd/yyyy) as indicated and click Submit. You will be taken to the next sign-up page. 5. Create a [employer] ID. This will be your [employer] login ID and cannot be changed, so think of one that is secure and easy to remember. 6. Create a [employer] password. You can change your password at any time. 7. Enter your Password Reset Question and Answer. This can be used at a later time if you forget your password. 8. Enter your e-mail address. You will receive e-mail notification when new information is available in [address] 9. Click Sign Up. You can now view and download portions of your medical record. 10. Click the Download Summary menu link to download a portable copy of your medical information. If you have questions, please visit the Frequently Asked Questions section of the [employer] website. Remember, [employer] is NOT to be used for urgent needs. For medical emergencies, dial 911. Now available from your iPhone and Android! Please provide this summary of care documentation to your next provider. Your primary care clinician is listed as Divya Reynolds. If you have any questions after today's visit, please call [phone].

## 2017-05-03 ENCOUNTER — PATIENT OUTREACH (OUTPATIENT)
Dept: FAMILY MEDICINE CLINIC | Age: 72
End: 2017-05-03

## 2017-05-03 NOTE — PROGRESS NOTES
[facility] Screening:    Noted in chart that Ms Ermalene Bamberger has rescheduled her colonoscopy procedure again. My plan is to call her a few days in advance of her June procedural date to ensure this doesn't keep happening.

## 2017-06-14 DIAGNOSIS — I10 ESSENTIAL HYPERTENSION WITH GOAL BLOOD PRESSURE LESS THAN 130/80: ICD-10-CM

## 2017-06-14 DIAGNOSIS — E11.9 TYPE 2 DIABETES MELLITUS WITHOUT COMPLICATION, WITHOUT LONG-TERM CURRENT USE OF INSULIN (HCC): ICD-10-CM

## 2017-06-14 NOTE — TELEPHONE ENCOUNTER
Requested Prescriptions     Pending Prescriptions Disp Refills    metFORMIN (GLUCOPHAGE) 1,000 mg tablet 180 Tab 1     Sig: Take 1 Tab by mouth two (2) times daily (with meals).  losartan (COZAAR) 25 mg tablet 90 Tab 1     Sig: Take 1 Tab by mouth daily.  glipiZIDE (GLUCOTROL) 5 mg tablet 90 Tab 1     Sig: Take 1 Tab by mouth daily.        90 day supply

## 2017-06-15 RX ORDER — METFORMIN HYDROCHLORIDE 1000 MG/1
1000 TABLET ORAL 2 TIMES DAILY WITH MEALS
Qty: 180 TAB | Refills: 1 | OUTPATIENT
Start: 2017-06-15

## 2017-06-15 RX ORDER — GLIPIZIDE 5 MG/1
5 TABLET ORAL DAILY
Qty: 90 TAB | Refills: 1 | OUTPATIENT
Start: 2017-06-15

## 2017-06-15 RX ORDER — LOSARTAN POTASSIUM 25 MG/1
25 TABLET ORAL DAILY
Qty: 90 TAB | Refills: 1 | OUTPATIENT
Start: 2017-06-15

## 2017-06-15 NOTE — TELEPHONE ENCOUNTER
Call made to [facility] on [address] spoke with the pharmacists in regards to the patients refill status on Glipizide, Metformin and Losartan. She stated that the pt has one 90 day supply on all of the medications and that she is a mark late but she would get them ready for her to . I then called the pt using two identifiers and she was made aware that she had one refill for  a 90 day supply left and that she can call her pharmacy for . She verbalized understanding and will pick them up today.

## 2017-06-15 NOTE — TELEPHONE ENCOUNTER
6/15/2017  1:56 PM    Chief Complaint   Patient presents with    Medication Refill       Noted refill request for Metformin, Losartan, and Glipizide. These were all sent for a 90 day supply and 1 refill 2/2017, which is enough to last until her next appointment in 8/2017. Forwarded to clinical staff to check with her pharmacy regarding refills.

## 2017-06-26 ENCOUNTER — PATIENT OUTREACH (OUTPATIENT)
Dept: FAMILY MEDICINE CLINIC | Age: 72
End: 2017-06-26

## 2017-06-26 NOTE — PROGRESS NOTES
[facility] Health Screening: In light of several times of rescheduling for colonoscopy procedure I have offered a gas card to Mrs. Germanie Sandoval of which she whole heartily accepted. I will place in the mail today. She feels better about asking her good friend to transport her to and from the facility for procedure on July 19th \"now that I have something to give her in return\".

## 2017-07-17 ENCOUNTER — PATIENT OUTREACH (OUTPATIENT)
Dept: FAMILY MEDICINE CLINIC | Age: 72
End: 2017-07-17

## 2017-07-18 ENCOUNTER — ANESTHESIA EVENT (OUTPATIENT)
Dept: ENDOSCOPY | Age: 72
End: 2017-07-18
Payer: MEDICARE

## 2017-07-19 ENCOUNTER — PATIENT OUTREACH (OUTPATIENT)
Dept: FAMILY MEDICINE CLINIC | Age: 72
End: 2017-07-19

## 2017-07-19 ENCOUNTER — ANESTHESIA (OUTPATIENT)
Dept: ENDOSCOPY | Age: 72
End: 2017-07-19
Payer: MEDICARE

## 2017-07-19 ENCOUNTER — HOSPITAL ENCOUNTER (OUTPATIENT)
Age: 72
Setting detail: OUTPATIENT SURGERY
Discharge: HOME | End: 2017-07-19
Attending: COLON & RECTAL SURGERY | Admitting: COLON & RECTAL SURGERY
Payer: MEDICARE

## 2017-07-19 VITALS
SYSTOLIC BLOOD PRESSURE: 131 MMHG | BODY MASS INDEX: 20.49 KG/M2 | DIASTOLIC BLOOD PRESSURE: 75 MMHG | OXYGEN SATURATION: 96 % | HEIGHT: 64 IN | WEIGHT: 120 LBS | TEMPERATURE: 97.1 F | RESPIRATION RATE: 15 BRPM | HEART RATE: 82 BPM

## 2017-07-19 PROBLEM — Z12.11 ENCOUNTER FOR SCREENING COLONOSCOPY: Status: ACTIVE | Noted: 2017-07-19

## 2017-07-19 LAB
GLUCOSE BLD STRIP.AUTO-MCNC: 122 MG/DL (ref 70–110)
GLUCOSE BLD STRIP.AUTO-MCNC: 149 MG/DL (ref 70–110)

## 2017-07-19 PROCEDURE — 76060000031 HC ANESTHESIA FIRST 0.5 HR: Performed by: COLON & RECTAL SURGERY

## 2017-07-19 PROCEDURE — C1729 CATH, DRAINAGE: HCPCS | Performed by: COLON & RECTAL SURGERY

## 2017-07-19 PROCEDURE — 74011000250 HC RX REV CODE- 250: Performed by: NURSE ANESTHETIST, CERTIFIED REGISTERED

## 2017-07-19 PROCEDURE — 74011250636 HC RX REV CODE- 250/636: Performed by: NURSE ANESTHETIST, CERTIFIED REGISTERED

## 2017-07-19 PROCEDURE — 76040000019: Performed by: COLON & RECTAL SURGERY

## 2017-07-19 PROCEDURE — 77030008565 HC TBNG SUC IRR ERBE -B: Performed by: COLON & RECTAL SURGERY

## 2017-07-19 PROCEDURE — 77030018846 HC SOL IRR STRL H20 ICUM -A: Performed by: COLON & RECTAL SURGERY

## 2017-07-19 PROCEDURE — 82962 GLUCOSE BLOOD TEST: CPT

## 2017-07-19 PROCEDURE — 74011250636 HC RX REV CODE- 250/636

## 2017-07-19 RX ORDER — ONDANSETRON 2 MG/ML
4 INJECTION INTRAMUSCULAR; INTRAVENOUS ONCE
Status: DISCONTINUED | OUTPATIENT
Start: 2017-07-19 | End: 2017-07-19 | Stop reason: HOSPADM

## 2017-07-19 RX ORDER — DEXTROSE 50 % IN WATER (D50W) INTRAVENOUS SYRINGE
25-50 AS NEEDED
Status: DISCONTINUED | OUTPATIENT
Start: 2017-07-19 | End: 2017-07-19 | Stop reason: HOSPADM

## 2017-07-19 RX ORDER — SODIUM CHLORIDE, SODIUM LACTATE, POTASSIUM CHLORIDE, CALCIUM CHLORIDE 600; 310; 30; 20 MG/100ML; MG/100ML; MG/100ML; MG/100ML
75 INJECTION, SOLUTION INTRAVENOUS CONTINUOUS
Status: DISCONTINUED | OUTPATIENT
Start: 2017-07-19 | End: 2017-07-19 | Stop reason: HOSPADM

## 2017-07-19 RX ORDER — INSULIN LISPRO 100 [IU]/ML
INJECTION, SOLUTION INTRAVENOUS; SUBCUTANEOUS ONCE
Status: DISCONTINUED | OUTPATIENT
Start: 2017-07-19 | End: 2017-07-19 | Stop reason: HOSPADM

## 2017-07-19 RX ORDER — SODIUM CHLORIDE 0.9 % (FLUSH) 0.9 %
5-10 SYRINGE (ML) INJECTION EVERY 8 HOURS
Status: DISCONTINUED | OUTPATIENT
Start: 2017-07-19 | End: 2017-07-19 | Stop reason: HOSPADM

## 2017-07-19 RX ORDER — HYDROMORPHONE HYDROCHLORIDE 2 MG/ML
0.5 INJECTION, SOLUTION INTRAMUSCULAR; INTRAVENOUS; SUBCUTANEOUS AS NEEDED
Status: DISCONTINUED | OUTPATIENT
Start: 2017-07-19 | End: 2017-07-19 | Stop reason: HOSPADM

## 2017-07-19 RX ORDER — PROPOFOL 10 MG/ML
INJECTION, EMULSION INTRAVENOUS AS NEEDED
Status: DISCONTINUED | OUTPATIENT
Start: 2017-07-19 | End: 2017-07-19 | Stop reason: HOSPADM

## 2017-07-19 RX ORDER — SODIUM CHLORIDE 0.9 % (FLUSH) 0.9 %
5-10 SYRINGE (ML) INJECTION AS NEEDED
Status: DISCONTINUED | OUTPATIENT
Start: 2017-07-19 | End: 2017-07-19 | Stop reason: HOSPADM

## 2017-07-19 RX ORDER — MAGNESIUM SULFATE 100 %
4 CRYSTALS MISCELLANEOUS AS NEEDED
Status: DISCONTINUED | OUTPATIENT
Start: 2017-07-19 | End: 2017-07-19 | Stop reason: HOSPADM

## 2017-07-19 RX ADMIN — PROPOFOL 40 MG: 10 INJECTION, EMULSION INTRAVENOUS at 12:53

## 2017-07-19 RX ADMIN — PROPOFOL 60 MG: 10 INJECTION, EMULSION INTRAVENOUS at 12:44

## 2017-07-19 RX ADMIN — PROPOFOL 60 MG: 10 INJECTION, EMULSION INTRAVENOUS at 12:49

## 2017-07-19 RX ADMIN — SODIUM CHLORIDE, SODIUM LACTATE, POTASSIUM CHLORIDE, AND CALCIUM CHLORIDE 75 ML/HR: 600; 310; 30; 20 INJECTION, SOLUTION INTRAVENOUS at 12:15

## 2017-07-19 RX ADMIN — FAMOTIDINE 20 MG: 10 INJECTION, SOLUTION INTRAVENOUS at 12:15

## 2017-07-19 NOTE — H&P
[facility]  [address], [facility] [address]  [place], [facility] [facility]        Colon and Rectal Surgery History and Physical    Subjective: Jaxson Stevens is a 67 y.o. female who presents for colonoscopy for   Screening colonoscopy. There is not a family history of colon cancer. Patient Active Problem List    Diagnosis Date Noted    Encounter for screening colonoscopy 07/19/2017    Type II diabetes mellitus ([address] 09/29/2015    Macular degeneration 09/29/2015    Osteoporosis 09/17/2015    Essential hypertension 07/13/2015     Past Medical History:   Diagnosis Date    Cataract 04/2017    bilateral     Diabetes ([address]     Diabetic retinopathy of both eyes ([address] 04/2017    Hypertension     Macular degeneration 9/2015    Osteoporosis 09/2015    on Prolia starting 2/2017      Past Surgical History:   Procedure Laterality Date    HX REFRACTIVE SURGERY  12/2016    right eye      Social History   Substance Use Topics    Smoking status: Never Smoker    Smokeless tobacco: Never Used    Alcohol use No      Family History   Problem Relation Age of Onset    Heart Disease Mother     Heart Disease Father       Prior to Admission medications    Medication Sig Start Date End Date Taking? Authorizing Provider   COQ10, UBIQUINOL, PO Take  by mouth. Yes Historical Provider   Calcium-Cholecalciferol, D3, (CALCIUM 500 + D) 500 mg(1,250mg) -400 unit tab Take 1 Tab by mouth two (2) times a day. 2/10/17  Yes Chevis Second, NP   metFORMIN (GLUCOPHAGE) 1,000 mg tablet Take 1 Tab by mouth two (2) times daily (with meals). 2/2/17  Yes Chevis Second, NP   losartan (COZAAR) 25 mg tablet Take 1 Tab by mouth daily. 2/2/17  Yes Chevis Second, NP   glipiZIDE (GLUCOTROL) 5 mg tablet Take 1 Tab by mouth daily. 2/2/17  Yes Chevis Second, NP   denosumab (PROLIA) 60 mg/mL injection 1 mL by SubCUTAneous route every 6 months. To be done at [facility].  2/1/17  Yes Roslyn Hennessy NP     Current Facility-Administered Medications   Medication Dose Route Frequency    lactated Ringers infusion  75 mL/hr IntraVENous CONTINUOUS    sodium chloride (NS) flush 5-10 mL  5-10 mL IntraVENous Q8H    sodium chloride (NS) flush 5-10 mL  5-10 mL IntraVENous PRN    insulin lispro (HUMALOG) injection   SubCUTAneous ONCE    glucose chewable tablet 16 g  4 Tab Oral PRN    glucagon (GLUCAGEN) injection 1 mg  1 mg IntraMUSCular PRN    dextrose (D50W) injection syrg 12.5-25 g  25-50 mL IntraVENous PRN    famotidine (PF) (PEPCID) 20 mg in sodium chloride 0.9 % 10 mL injection  20 mg IntraVENous ONCE    sodium chloride (NS) flush 5-10 mL  5-10 mL IntraVENous Q8H    sodium chloride (NS) flush 5-10 mL  5-10 mL IntraVENous PRN     Allergies   Allergen Reactions    Aspralum [Aspirin, Buffered] Other (comments)     Causes abdominal cramping          Objective:     Visit Vitals    Ht 5' 4\" (1.626 m)    Wt 54.9 kg (121 lb)    Breastfeeding No    BMI 20.77 kg/m2        Physical Exam:   GENERAL: alert, cooperative, no distress, appears stated age  LUNG: clear to auscultation bilaterally  HEART: regular rate and rhythm, S1, S2 normal, no murmur, click, rub or gallop  ABDOMEN: soft, non-tender. Bowel sounds normal. No masses,  no organomegaly       Assessment:     Juliette Romero is a 67 y.o. female who requires colonoscopy for   Screening colonoscopy. Plan:     1. I recommend proceeding with colonoscopy. The patient was in full agreement and was eager to proceed. 2. I discussed the details of the colonoscopy procedure. The risks of colonoscopy were discussed including colon injury/perforation, anesthesia issues, bleeding, and the possibility of incomplete examination. The patient was willing to accept these risks and proceed with the examination. All questions were answered to the patient's satisfaction.          Jimenez Lancaster MD, FACS, FASCRS  Colon and Rectal Surgery  [facility]  Office [phone]  Fax     [phone]  7/19/2017  12:04 PM

## 2017-07-19 NOTE — IP AVS SNAPSHOT
Neela Dos Santos 
 
 
 [address] Patient: Jaxson Stevens MRN: [id number] [id number] You are allergic to the following Allergen Reactions Aspralum (Aspirin, Buffered) Other (comments) Causes abdominal cramping Recent Documentation Height Weight Breastfeeding? BMI OB Status Smoking Status 1.626 m 54.4 kg No 20.6 kg/m2 Postmenopausal Never Smoker Emergency Contacts Name Discharge Info Relation Home Work Mobile Liv Breen  Friend [5] [phone] Norberta Boas DISCHARGE CAREGIVER [3] Friend [5] [phone] About your hospitalization You were admitted on:  July 19, 2017 You last received care in the:  [facility] PHASE 2 RECOVERY You were discharged on:  July 19, 2017 Unit phone number:  [phone] Why you were hospitalized Your primary diagnosis was:  Not on File Your diagnoses also included:  Encounter For Screening Colonoscopy Providers Seen During Your Hospitalizations Provider Role Specialty Primary office phone Jorge Ibanez MD Attending Provider Colon and Rectal Surgery [phone] Your Primary Care Physician (PCP) Primary Care Physician Office Phone Office Fax Aimee Avendaño [phone] Follow-up Information Follow up With Details Comments Contact Info Eric Sunshine NP   [address] [facility] [address] [facility] [address] [phone] Jorge Ibanez MD  Please contact Dr Alexa Figueredo for any questions [address] [facility] [address] 200 [facility] 
[phone] Your Appointments Wednesday August 16, 2017  9:00 AM EDT FOLLOW UP EXAM with Eric Sunshine NP [facility] ([address] [place] [address]) [address] [facility] [address] [phone] Monday August 28, 2017 11:00 AM EDT INFUSION 30 with HBV INFUSION NURSE 1  
HBV OP INFUSION ([place]) [place] [phone] [facility]  
[phone] Note: Patient must have a  if they take medication(s) that impairs their ability to operate a motor vehicle Current Discharge Medication List  
  
CONTINUE these medications which have NOT CHANGED Dose & Instructions Dispensing Information Comments Morning Noon Evening Bedtime Calcium-Cholecalciferol (D3) 500 mg(1,250mg) -400 unit Tab Commonly known as:  CALCIUM 500 + D Your last dose was: Your next dose is:    
   
   
 Dose:  1 Tab Take 1 Tab by mouth two (2) times a day. Quantity:  180 Tab Refills:  4  
     
   
   
   
  
 COQ10 (UBIQUINOL) PO Your last dose was: Your next dose is: Take  by mouth. Refills:  0  
     
   
   
   
  
 denosumab 60 mg/mL injection Commonly known as:  [place] Your last dose was: Your next dose is:    
   
   
 Dose:  60 mg  
1 mL by SubCUTAneous route every 6 months. To be done at [facility]. Quantity:  1 Syringe Refills:  1  
     
   
   
   
  
 glipiZIDE 5 mg tablet Commonly known as:  [place] Your last dose was: Your next dose is:    
   
   
 Dose:  5 mg Take 1 Tab by mouth daily. Quantity:  90 Tab Refills:  1  
     
   
   
   
  
 losartan 25 mg tablet Commonly known as:  COZAAR Your last dose was: Your next dose is:    
   
   
 Dose:  25 mg Take 1 Tab by mouth daily. Quantity:  90 Tab Refills:  1  
     
   
   
   
  
 metFORMIN 1,000 mg tablet Commonly known as:  GLUCOPHAGE Your last dose was: Your next dose is:    
   
   
 Dose:  1000 mg Take 1 Tab by mouth two (2) times daily (with meals). Quantity:  180 Tab Refills:  1 Discharge Instructions Colonoscopy Discharge Instructions Jack Figueroa [id number] 
1945 COLONOSCOPY FINDINGS: 
Your colonoscopy showed: 1. Moderate hemorrhoid disease. 2. Otherwise normal examination. FOLLOW UP RECOMMENDATIONS: 
 Dr. Kirk Stockton recommends your next colonoscopy in 10 years. Please contact Dr. Kirk Stockton if the hemorrhoid disease bothers you. DISCOMFORT: 
If you have redness at your IV site- apply warm compress to area; if redness or soreness persist- contact your physician There may be a slight amount of blood passed from the rectum, more than a teaspoon of bright red blood is not expected - contact your physician Gaseous discomfort is common- walking, belching will help relieve any gas pains. If discomfort persist- contact your physician DIET: 
 High fiber diet. ACTIVITY: 
You may resume your normal daily activities, however, it is recommended that you spend the remainder of the day resting - avoiding any strenuous activities. You may not operate a vehicle for 24 hours You may not engage in an occupation involving machinery or appliances for rest of today You may not drink alcoholic beverages for at least 24 hours Avoid making any critical decisions for at least 24 hour CALL M.D. ANY SIGN OF: Increasing pain, nausea, vomiting Abdominal distension (swelling) New increased bleeding Fever or chills Pain in chest area or shortness of breath Renetta Moore MD, FACS, FASCRS Colon and Rectal Surgery [facility] Office [phone] Fax     [phone] High-Fiber Diet: Care Instructions Your Care Instructions A high-fiber diet may help you relieve constipation and feel less bloated. Your doctor and dietitian will help you make a high-fiber eating plan based on your personal needs. The plan will include the things you like to eat. It will also make sure that you get 30 grams of fiber a day. Before you make changes to the way you eat, be sure to talk with your doctor or dietitian. Follow-up care is a key part of your treatment and safety. Be sure to make and go to all appointments, and call your doctor if you are having problems. It's also a good idea to know your test results and keep a list of the medicines you take. How can you care for yourself at home? · You can increase how much fiber you get if you eat more of certain foods. These foods include: ¨ Whole-grain breads and cereals. ¨ Fruits, such as pears, apples, and peaches. Eat the skins and peels if you can. ¨ Vegetables, such as broccoli, cabbage, spinach, carrots, asparagus, and squash. ¨ Starchy vegetables. These include potatoes with skins, kidney beans, and lima beans. · Take a fiber supplement every day if your doctor recommends it. Examples are Benefiber, Citrucel, FiberCon, and Metamucil. Ask your doctor how much to take. · Drink plenty of fluids, enough so that your urine is light yellow or clear like water. If you have kidney, heart, or liver disease and have to limit fluids, talk with your doctor before you increase the amount of fluids you drink. · Get some exercise every day. Exercise helps stool move through the colon. It also helps prevent constipation. · Keep a food diary. Try to notice and write down what foods cause gas, pain, or other symptoms. Then you can avoid these foods. Where can you learn more? Go to http://ruiz-tino.info/. Enter L520 in the search box to learn more about \"High-Fiber Diet: Care Instructions. \" Current as of: December 15, 2016 Content Version: 11.3 © 2297-2525 [employer]. Care instructions adapted under license by [employer] (which disclaims liability or warranty for this information). If you have questions about a medical condition or this instruction, always ask your healthcare professional. [address] any warranty or liability for your use of this information. DISCHARGE SUMMARY from Nurse The following personal items are in your possession at time of discharge: 
 
Dental Appliances: None Visual Aid: None PATIENT INSTRUCTIONS: 
 
 
F-face looks uneven A-arms unable to move or move unevenly S-speech slurred or non-existent T-time-call 911 as soon as signs and symptoms begin-DO NOT go Back to bed or wait to see if you get better-TIME IS BRAIN. Warning Signs of HEART ATTACK Call 911 if you have these symptoms: 
? Chest discomfort. Most heart attacks involve discomfort in the center of the chest that lasts more than a few minutes, or that goes away and comes back. It can feel like uncomfortable pressure, squeezing, fullness, or pain. ? Discomfort in other areas of the upper body. Symptoms can include pain or discomfort in one or both arms, the back, neck, jaw, or stomach. ? Shortness of breath with or without chest discomfort. ? Other signs may include breaking out in a cold sweat, nausea, or lightheadedness. Don't wait more than five minutes to call [address]! Fast action can save your life. Calling 911 is almost always the fastest way to get lifesaving treatment. Emergency Medical Services staff can begin treatment when they arrive  up to an hour sooner than if someone gets to the hospital by car. The discharge information has been reviewed with the patient. The patient verbalized understanding. Discharge medications reviewed with the patient and appropriate educational materials and side effects teaching were provided. Discharge Orders None Introducing [facility]! [employer] introduces [employer] patient portal. Now you can access parts of your medical record, email your doctor's office, and request medication refills online. 1. In your internet browser, go to [email or web address] 2. Click on the First Time User? Click Here link in the Sign In box. You will see the New Member Sign Up page. 3. Enter your [employer] Access Code exactly as it appears below. You will not need to use this code after youve completed the sign-up process. If you do not sign up before the expiration date, you must request a new code. · [employer] Access Code: [email or web address] Expires: 10/17/2017  1:41 PM 
 
4. Enter the last four digits of your Social Security Number (xxxx) and Date of Birth (mm/dd/yyyy) as indicated and click Submit. You will be taken to the next sign-up page. 5. Create a [employer] ID. This will be your [employer] login ID and cannot be changed, so think of one that is secure and easy to remember. 6. Create a [employer] password. You can change your password at any time. 7. Enter your Password Reset Question and Answer. This can be used at a later time if you forget your password. 8. Enter your e-mail address. You will receive e-mail notification when new information is available in [address] 9. Click Sign Up. You can now view and download portions of your medical record. 10. Click the Download Summary menu link to download a portable copy of your medical information. If you have questions, please visit the Frequently Asked Questions section of the [employer] website. Remember, [employer] is NOT to be used for urgent needs. For medical emergencies, dial 911. Now available from your iPhone and Android! General Information Please provide this summary of care documentation to your next provider. Patient Signature:  ____________________________________________________________ Date:  ____________________________________________________________  
  
Hans Landsman Provider Signature:  ____________________________________________________________ Date:  ____________________________________________________________

## 2017-07-19 NOTE — PROCEDURES
Colonoscopy Procedure Note      Yue Cotton  [birth year]  [id number]                Date of Procedure: 7/19/2017    Indications:    Screening colonoscopy     Preoperative diagnosis: Colon cancer screening [Z12.11]      Postoperative diagnosis: Hemorrhoids, Otherwise normal exam    Title of Procedure:  Colonoscopy, screening    :  Olga Harp MD    Assistant(s): Endoscopy Technician-1: Denton Quinn; Fahad Silva  Endoscopy RN-1: Kaylynn Diane RN; Arina Correa    Referring Source:  Hermes Garcia NP    Sedation:  MAC      ASA Class: ASA 3 - Severe systemic disease but compensated        Procedure Details:    Prior to the procedure, a history and physical were performed. The patients medications, allergies and sensitivities were reviewed and all questions were answered. After informed consent was obtained for the procedure, with all risks and benefits of procedure explained. The patient was taken to the endoscopy suite and placed in the left lateral decubitus position. Patient identification and proposed procedure were verified prior to the procedure by the nurse and I. Following the  satisfactory administration of sedation,  the anus was inspected and appeared abnormal with findings of a moderate prolapsed external and internal hemorrhoids in the right posterior quadrant. Digital rectal examination revealed Normal sphincter tone and squeeze pressure. Palpation revealed No Masses. Next the Olympus video colonoscope was introduced through the anus and advanced to cecum, which was identified by the ileocecal valve and appendiceal orifice, terminal ileum. The quality of preparation was good. The terminal ileum was visualized. The colonoscope was then slowly withdrawn and the mucosa carefully examined for any abnormalities. Cecal withdrawl time was greater than six minutes. The patient tolerated the procedure well.       Findings:   Rectum: normal  Sigmoid: normal  Descending Colon: normal  Transverse Colon: normal  Ascending Colon: normal  Cecum: normal  Terminal Ileum: normal    Interventions:  none    Specimen Removed: * No specimens in log *     Complications: None. EBL:  None. Impression:   Hemorrhoids, Otherwise normal exam     Recommendations: -Repeat colonoscopy in 10 years   Resume normal medication(s). Discharge Disposition:  Home in the company of a  when able to ambulate.         Nasim Peralta MD, FACS, FASCRS  Colon and Rectal Surgery  [facility]  Office [phone]  Fax     [phone]  7/19/2017  1:03 PM       [facility]  [place] [address]  [place], [address]

## 2017-07-19 NOTE — DISCHARGE INSTRUCTIONS
Colonoscopy Discharge Instructions       Timothy Salguero  [id number]  1945      COLONOSCOPY FINDINGS:  Your colonoscopy showed: 1. Moderate hemorrhoid disease. 2. Otherwise normal examination. FOLLOW UP RECOMMENDATIONS:   Dr. Sherry Sofia recommends your next colonoscopy in 10 years. Please contact Dr. Sherry Sofia if the hemorrhoid disease bothers you. DISCOMFORT:  If you have redness at your IV site- apply warm compress to area; if redness or soreness persist- contact your physician  There may be a slight amount of blood passed from the rectum, more than a teaspoon of bright red blood is not expected - contact your physician  Gaseous discomfort is common- walking, belching will help relieve any gas pains. If discomfort persist- contact your physician    DIET:   High fiber diet. ACTIVITY:  You may resume your normal daily activities, however, it is recommended that you spend the remainder of the day resting - avoiding any strenuous activities. You may not operate a vehicle for 24 hours  You may not engage in an occupation involving machinery or appliances for rest of today  You may not drink alcoholic beverages for at least 24 hours  Avoid making any critical decisions for at least 24 hour    CALL M.D. ANY SIGN OF:   Increasing pain, nausea, vomiting  Abdominal distension (swelling)  New increased bleeding   Fever or chills  Pain in chest area or shortness of breath      Pinky Howe MD, FACS, FASCRS  Colon and Rectal Surgery  [facility]  Office [phone]  Fax     [phone]      High-Fiber Diet: Care Instructions  Your Care Instructions  A high-fiber diet may help you relieve constipation and feel less bloated. Your doctor and dietitian will help you make a high-fiber eating plan based on your personal needs. The plan will include the things you like to eat. It will also make sure that you get 30 grams of fiber a day.   Before you make changes to the way you eat, be sure to talk with your doctor or dietitian. Follow-up care is a key part of your treatment and safety. Be sure to make and go to all appointments, and call your doctor if you are having problems. It's also a good idea to know your test results and keep a list of the medicines you take. How can you care for yourself at home? · You can increase how much fiber you get if you eat more of certain foods. These foods include:  ¨ Whole-grain breads and cereals. ¨ Fruits, such as pears, apples, and peaches. Eat the skins and peels if you can. ¨ Vegetables, such as broccoli, cabbage, spinach, carrots, asparagus, and squash. ¨ Starchy vegetables. These include potatoes with skins, kidney beans, and lima beans. · Take a fiber supplement every day if your doctor recommends it. Examples are Benefiber, Citrucel, FiberCon, and Metamucil. Ask your doctor how much to take. · Drink plenty of fluids, enough so that your urine is light yellow or clear like water. If you have kidney, heart, or liver disease and have to limit fluids, talk with your doctor before you increase the amount of fluids you drink. · Get some exercise every day. Exercise helps stool move through the colon. It also helps prevent constipation. · Keep a food diary. Try to notice and write down what foods cause gas, pain, or other symptoms. Then you can avoid these foods. Where can you learn more? Go to http://ruiz-tino.info/. Enter S523 in the search box to learn more about \"High-Fiber Diet: Care Instructions. \"  Current as of: December 15, 2016  Content Version: 11.3  © 4997-4569 [employer]. Care instructions adapted under license by [employer] (which disclaims liability or warranty for this information).  If you have questions about a medical condition or this instruction, always ask your healthcare professional. [address] any warranty or liability for your use of this information. DISCHARGE SUMMARY from Nurse    The following personal items are in your possession at time of discharge:    Dental Appliances: None  Visual Aid: None                  PATIENT INSTRUCTIONS:    After general anesthesia or intravenous sedation, for 24 hours or while taking prescription Narcotics:  · Limit your activities  · Do not drive and operate hazardous machinery  · Do not make important personal or business decisions  · Do  not drink alcoholic beverages  · If you have not urinated within 8 hours after discharge, please contact your surgeon on call. Report the following to your surgeon:  · Excessive pain, swelling, redness or odor of or around the surgical area  · Temperature over 100.5  · Nausea and vomiting lasting longer than 4 hours or if unable to take medications  · Any signs of decreased circulation or nerve impairment to extremity: change in color, persistent  numbness, tingling, coldness or increase pain  · Any questions        *  Please give a list of your current medications to your Primary Care Provider. *  Please update this list whenever your medications are discontinued, doses are      changed, or new medications (including over-the-counter products) are added. *  Please carry medication information at all times in case of emergency situations. These are general instructions for a healthy lifestyle:    No smoking/ No tobacco products/ Avoid exposure to second hand smoke    Surgeon General's Warning:  Quitting smoking now greatly reduces serious risk to your health.     Obesity, smoking, and sedentary lifestyle greatly increases your risk for illness    A healthy diet, regular physical exercise & weight monitoring are important for maintaining a healthy lifestyle    You may be retaining fluid if you have a history of heart failure or if you experience any of the following symptoms:  Weight gain of 3 pounds or more overnight or 5 pounds in a week, increased swelling in our hands or feet or shortness of breath while lying flat in bed. Please call your doctor as soon as you notice any of these symptoms; do not wait until your next office visit. Recognize signs and symptoms of STROKE:    F-face looks uneven    A-arms unable to move or move unevenly    S-speech slurred or non-existent    T-time-call 911 as soon as signs and symptoms begin-DO NOT go       Back to bed or wait to see if you get better-TIME IS BRAIN. Warning Signs of HEART ATTACK     Call 911 if you have these symptoms:   Chest discomfort. Most heart attacks involve discomfort in the center of the chest that lasts more than a few minutes, or that goes away and comes back. It can feel like uncomfortable pressure, squeezing, fullness, or pain.  Discomfort in other areas of the upper body. Symptoms can include pain or discomfort in one or both arms, the back, neck, jaw, or stomach.  Shortness of breath with or without chest discomfort.  Other signs may include breaking out in a cold sweat, nausea, or lightheadedness. Don't wait more than five minutes to call 911 - MINUTES MATTER! Fast action can save your life. Calling 911 is almost always the fastest way to get lifesaving treatment. Emergency Medical Services staff can begin treatment when they arrive -- up to an hour sooner than if someone gets to the hospital by car. The discharge information has been reviewed with the patient. The patient verbalized understanding. Discharge medications reviewed with the patient and appropriate educational materials and side effects teaching were provided.

## 2017-07-19 NOTE — ANESTHESIA POSTPROCEDURE EVALUATION
Post-Anesthesia Evaluation and Assessment    Patient: Maxine Gaitan MRN: [id number]  SSN: [id number]    YOB: 1945  Age: 67 y.o. Sex: female       Cardiovascular Function/Vital Signs  Visit Vitals    /62    Pulse 79    Temp 36.9 °C (98.5 °F)    Resp 17    Ht 5' 4\" (1.626 m)    Wt 54.4 kg (120 lb)    SpO2 100%    Breastfeeding No    BMI 20.6 kg/m2       Patient is status post MAC anesthesia for Procedure(s):  COLONOSCOPY. Nausea/Vomiting: None    Postoperative hydration reviewed and adequate. Pain:  Pain Scale 1: Numeric (0 - 10) (07/19/17 1327)  Pain Intensity 1: 0 (07/19/17 1327)   Managed    Neurological Status: At baseline    Mental Status and Level of Consciousness: Arousable    Pulmonary Status:   O2 Device: Room air (07/19/17 1305)   Adequate oxygenation and airway patent    Complications related to anesthesia: None    Post-anesthesia assessment completed.  No concerns    Signed By: Teddy Stack MD     July 19, 2017

## 2017-07-19 NOTE — ANESTHESIA PREPROCEDURE EVALUATION
Anesthetic History   No history of anesthetic complications            Review of Systems / Medical History  Patient summary reviewed and pertinent labs reviewed    Pulmonary  Within defined limits                 Neuro/Psych   Within defined limits           Cardiovascular    Hypertension: well controlled              Exercise tolerance: >4 METS     GI/Hepatic/Renal  Within defined limits              Endo/Other    Diabetes: type 2         Other Findings   Comments:   Risk Factors for Postoperative nausea/vomiting:       History of postoperative nausea/vomiting? NO       Female? YES       Motion sickness? NO       Intended opioid administration for postoperative analgesia? NO      Smoking Abstinence  Current Smoker? NO  Elective Surgery? YES  Seen preoperatively by anesthesiologist or proxy prior to day of surgery? YES  Pt abstained from smoking 24 hours prior to anesthesia?  YES           Physical Exam    Airway  Mallampati: III  TM Distance: 4 - 6 cm  Neck ROM: normal range of motion   Mouth opening: Normal     Cardiovascular  Regular rate and rhythm,  S1 and S2 normal,  no murmur, click, rub, or gallop             Dental    Dentition: Poor dentition     Pulmonary  Breath sounds clear to auscultation               Abdominal  GI exam deferred       Other Findings            Anesthetic Plan    ASA: 2  Anesthesia type: MAC          Induction: Intravenous  Anesthetic plan and risks discussed with: Patient

## 2017-07-20 ENCOUNTER — PATIENT OUTREACH (OUTPATIENT)
Dept: FAMILY MEDICINE CLINIC | Age: 72
End: 2017-07-20

## 2017-08-16 ENCOUNTER — OFFICE VISIT (OUTPATIENT)
Dept: FAMILY MEDICINE CLINIC | Age: 72
End: 2017-08-16

## 2017-08-16 VITALS
SYSTOLIC BLOOD PRESSURE: 157 MMHG | HEART RATE: 84 BPM | WEIGHT: 124 LBS | OXYGEN SATURATION: 99 % | RESPIRATION RATE: 18 BRPM | DIASTOLIC BLOOD PRESSURE: 80 MMHG | TEMPERATURE: 97.8 F | HEIGHT: 64 IN | BODY MASS INDEX: 21.17 KG/M2

## 2017-08-16 DIAGNOSIS — Z12.31 ENCOUNTER FOR SCREENING MAMMOGRAM FOR BREAST CANCER: ICD-10-CM

## 2017-08-16 DIAGNOSIS — I10 ESSENTIAL HYPERTENSION WITH GOAL BLOOD PRESSURE LESS THAN 130/80: Primary | ICD-10-CM

## 2017-08-16 DIAGNOSIS — M81.0 OSTEOPOROSIS, UNSPECIFIED OSTEOPOROSIS TYPE, UNSPECIFIED PATHOLOGICAL FRACTURE PRESENCE: ICD-10-CM

## 2017-08-16 DIAGNOSIS — E11.9 TYPE 2 DIABETES MELLITUS WITHOUT COMPLICATION, WITHOUT LONG-TERM CURRENT USE OF INSULIN (HCC): ICD-10-CM

## 2017-08-16 RX ORDER — METFORMIN HYDROCHLORIDE 1000 MG/1
1000 TABLET ORAL 2 TIMES DAILY WITH MEALS
Qty: 180 TAB | Refills: 1 | Status: SHIPPED | OUTPATIENT
Start: 2017-08-16 | End: 2018-01-22 | Stop reason: SDUPTHER

## 2017-08-16 RX ORDER — LOSARTAN POTASSIUM 25 MG/1
25 TABLET ORAL DAILY
Qty: 90 TAB | Refills: 1 | Status: SHIPPED | OUTPATIENT
Start: 2017-08-16 | End: 2018-01-22 | Stop reason: SDUPTHER

## 2017-08-16 RX ORDER — GLIPIZIDE 5 MG/1
5 TABLET ORAL DAILY
Qty: 90 TAB | Refills: 1 | Status: SHIPPED | OUTPATIENT
Start: 2017-08-16 | End: 2018-01-22 | Stop reason: SDUPTHER

## 2017-08-16 NOTE — PROGRESS NOTES
HISTORY OF PRESENT ILLNESS  Jeramy Piña is a 67 y.o. female. 8/16/2017  10:11 AM    Chief Complaint   Patient presents with    Follow-up     HTN and Diabetes     Medication Refill     All medications        HPI: Here today for follow up on chronic conditions. Labs done 2/2017. Follows with eye doctor. HTN: Taking Losartan as prescribed. Does not check BP at home regularly. No complaints of headaches, dizziness, chest pain, or leg swelling. Diabetes: Does check her sugars at home- glucose this . Currently on Glipizide and Metformin. Does attempt to eat ADA diet at home. LDL 88 2/2017. Not on statin, is on ARB. Does not follow with endocrine. Does follow with optometry. Historically well controlled HgbA1c. Osteoporosis: First Prolia injection completed 2/2017, upcoming scheduled for 8/28. Previously on Fosamax. Reports bone and muscle pain with Fosamax. Taking Calcium and Vit D. Review of Systems   Constitutional: Negative for chills, fever and malaise/fatigue. Respiratory: Negative for cough, shortness of breath and wheezing. Cardiovascular: Negative for chest pain, palpitations and leg swelling. Gastrointestinal: Negative for abdominal pain, diarrhea, nausea and vomiting. Neurological: Negative for dizziness and headaches.         PHQ Screening   PHQ over the last two weeks 2/2/2017   Little interest or pleasure in doing things Not at all   Feeling down, depressed or hopeless Not at all   Total Score PHQ 2 0         History  Past Medical History:   Diagnosis Date    Cataract 04/2017    bilateral     [facility] [address]     Diabetic retinopathy of both eyes ([facility] [address] [facility] 04/2017    Hypertension     Macular degeneration 9/2015    Osteoporosis 09/2015    on Prolia starting 2/2017       Past Surgical History:   Procedure Laterality Date    COLONOSCOPY N/A 7/19/2017    COLONOSCOPY performed by Antonio Sellers MD at [address] [place] [address]  12/2016    right eye       Social History     Social History    Marital status:      Spouse name: N/A    Number of children: N/A    Years of education: N/A     Occupational History    Not on file. Social History Main Topics    Smoking status: Never Smoker    Smokeless tobacco: Never Used    Alcohol use No    Drug use: No    Sexual activity: Not Currently     Other Topics Concern    Not on file     Social History Narrative       Family History   Problem Relation Age of Onset    Heart Disease Mother     Heart Disease Father        Allergies   Allergen Reactions    Aspralum [Aspirin, Buffered] Other (comments)     Causes abdominal cramping       Current Outpatient Prescriptions   Medication Sig Dispense Refill    COQ10, UBIQUINOL, PO Take  by mouth.  Calcium-Cholecalciferol, D3, (CALCIUM 500 + D) 500 mg(1,250mg) -400 unit tab Take 1 Tab by mouth two (2) times a day. 180 Tab 4    metFORMIN (GLUCOPHAGE) 1,000 mg tablet Take 1 Tab by mouth two (2) times daily (with meals). 180 Tab 1    losartan (COZAAR) 25 mg tablet Take 1 Tab by mouth daily. 90 Tab 1    glipiZIDE (GLUCOTROL) 5 mg tablet Take 1 Tab by mouth daily. 90 Tab 1    denosumab (PROLIA) 60 mg/mL injection 1 mL by SubCUTAneous route every 6 months. To be done at [facility]. 1 Syringe 1       Health Maintenance and Screenings  *Medicare Wellness 11/2/2016    Updates:     A1c 5/2017- 5.6%  LDL 88 2/2017  Colon Cancer: Colonoscopy done 7/2017- normal  Mammo- last done 9/2015- reordered  DEXA- +osteoporosis- last done 9/2015- reordered      Advance Care Planning:   Patient was offered the opportunity to discuss advance care planning NO   Does patient have an Advance Directive:  NO   If no, did you provide information on Caring Connections?          Patient Care Team:  Patient Care Team:  Gage Norton NP as PCP - General (Nurse Practitioner)  Robert Landa MD (Ophthalmology)        LABS:  None new to review    RADIOLOGY:  None new to review      Physical Exam   Constitutional: She is oriented to person, place, and time. She appears well-developed and well-nourished. No distress. Cardiovascular: Normal rate, regular rhythm and normal heart sounds. No murmur heard. Pulmonary/Chest: Effort normal and breath sounds normal. No respiratory distress. Abdominal: Soft. Bowel sounds are normal. There is no tenderness. Neurological: She is alert and oriented to person, place, and time. Skin: Skin is warm and dry. Vitals:    08/16/17 0959   BP: 157/80   Pulse: 84   Resp: 18   Temp: 97.8 °F (36.6 °C)   TempSrc: Oral   SpO2: 99%   Weight: 124 lb (56.2 kg)   Height: 5' 4\" (1.626 m)   PainSc:   4   PainLoc: Neck     BP Readings from Last 3 Encounters:   08/16/17 157/80   07/19/17 131/75   05/02/17 119/63       ASSESSMENT and Sam Laguerre was seen today for follow up chronic condition. Diagnoses and all orders for this visit:      Essential hypertension with goal blood pressure less than 130/80  *Elevated today- fairly well controlled at last visit- consider increase in medication if continued elevation at next visit. - losartan (COZAAR) 25 mg tablet; Take 1 Tab by mouth daily. Dispense: 90 Tab; Refill: 1    Type 2 diabetes mellitus without complication, without long-term current use of insulin ([facility])  *Refilled on medications. Historically well controlled. Will need A1c at next visit. Will have POC chemistry done upcoming with infusion center in preparation for Prolia injection. - metFORMIN (GLUCOPHAGE) 1,000 mg tablet; Take 1 Tab by mouth two (2) times daily (with meals). Dispense: 180 Tab; Refill: 1  - glipiZIDE (GLUCOTROL) 5 mg tablet; Take 1 Tab by mouth daily. Dispense: 90 Tab; Refill: 1    Osteoporosis, unspecified osteoporosis type, unspecified pathological fracture presence  *Repeat ordered. On Fosamax previously, now on Prolia for last 6 months.    - DEXA BONE DENSITY STUDY AXIAL; Future    Encounter for screening mammogram for breast cancer  - [facility] MAMMO BI SCREENING INCL CAD; Future        *Plan of care reviewed with patient. Patient in agreement with plan and expresses understanding. All questions answered and patient encouraged to call or RTO if further questions or concerns. Follow-up Disposition:  Return in about 3 months (around 11/16/2017) for Medicare Wellness Visit and chronic disease follow up- 45 mins. [place]

## 2017-08-16 NOTE — MR AVS SNAPSHOT
Visit Information Date & Time Provider Department Dept. Phone Encounter #  
 8/16/2017  9:00 AM Melba Garcia, [facility] [phone] [id number] Follow-up Instructions Return in about 3 months (around 11/16/2017) for Medicare Wellness Visit and chronic disease follow up- 45 mins. .  
  
Your Appointments 11/10/2017  9:15 AM  
Office Visit with Alfred Hein MD  
[facility] [address] [facility]) Appt Note: ; chronic disease f/u  
 [address] [facility] [id number]  
[facility] [id number] Upcoming Health Maintenance Date Due DTaP/Tdap/Td series (1 - Tdap) 2/15/1966 INFLUENZA AGE 9 TO ADULT 8/1/2017 BREAST CANCER SCRN MAMMOGRAM 9/15/2017 Bone Densitometry 9/15/2017 HEMOGLOBIN A1C Q6M 11/2/2017 MEDICARE YEARLY EXAM 11/3/2017 FOOT EXAM Q1 2/2/2018 MICROALBUMIN Q1 2/6/2018 LIPID PANEL Q1 2/6/2018 EYE EXAM RETINAL OR DILATED Q1 4/5/2018 GLAUCOMA SCREENING Q2Y 4/5/2019 COLONOSCOPY 7/21/2027 Allergies as of 8/16/2017  Review Complete On: 8/16/2017 By: Sarah Bowels Severity Noted Reaction Type Reactions Aspralum [Aspirin, Buffered] High 05/02/2017   Intolerance Other (comments) Causes abdominal cramping Current Immunizations  Reviewed on 9/18/2015 Name Date Influenza High Dose Vaccine PF 11/2/2016 Influenza Vaccine 9/18/2015 Pneumococcal Conjugate (PCV-13) 11/2/2016 Pneumococcal Polysaccharide (PPSV-23) 4/17/2015  3:28 PM  
 Zoster Vaccine, Live 1/1/2015 Not reviewed this visit You Were Diagnosed With   
  
 Codes Comments Essential hypertension with goal blood pressure less than 130/80    -  Primary ICD-10-CM: I10 
ICD-9-CM: 401.9 Type 2 diabetes mellitus without complication, without long-term current use of insulin (HCC)     ICD-10-CM: E11.9 ICD-9-CM: 250.00 Osteoporosis, unspecified osteoporosis type, unspecified pathological fracture presence     ICD-10-CM: M81.0 ICD-9-CM: 733.00 Encounter for screening mammogram for breast cancer     ICD-10-CM: Z12.31 
ICD-9-CM: V76.12 Vitals BP Pulse Temp Resp Height(growth percentile) Weight(growth percentile) 157/80 84 97.8 °F (36.6 °C) (Oral) 18 5' 4\" (1.626 m) 124 lb (56.2 kg) SpO2 BMI OB Status Smoking Status 99% 21.28 kg/m2 Postmenopausal Never Smoker Vitals History BMI and BSA Data Body Mass Index Body Surface Area  
 21.28 kg/m 2 1.59 m 2 Preferred Pharmacy Pharmacy Name Phone [facility] [address] [facility] [phone] Your Updated Medication List  
  
   
This list is accurate as of: 8/16/17 10:22 AM.  Always use your most recent med list.  
  
  
  
  
 Calcium-Cholecalciferol (D3) 500 mg(1,250mg) -400 unit Tab Commonly known as:  CALCIUM 500 + D Take 1 Tab by mouth two (2) times a day. COQ10 (UBIQUINOL) PO Take  by mouth. denosumab 60 mg/mL injection Commonly known as:  Margeret Bee 1 mL by SubCUTAneous route every 6 months. To be done at [facility]. glipiZIDE 5 mg tablet Commonly known as:  Vinnie Picking Take 1 Tab by mouth daily. losartan 25 mg tablet Commonly known as:  COZAAR Take 1 Tab by mouth daily. metFORMIN 1,000 mg tablet Commonly known as:  GLUCOPHAGE Take 1 Tab by mouth two (2) times daily (with meals). Prescriptions Sent to Pharmacy Refills  
 metFORMIN (GLUCOPHAGE) 1,000 mg tablet 1 Sig: Take 1 Tab by mouth two (2) times daily (with meals). Class: Normal  
 Pharmacy: [facility]aryn Pearson [address] [place] [address] #: [phone] Route: Oral  
 losartan (COZAAR) 25 mg tablet 1 Sig: Take 1 Tab by mouth daily.   
 Class: Normal  
 Pharmacy: [facility]ette Carney [address] [place] [address] Ph #: [phone] Route: Oral  
 glipiZIDE (GLUCOTROL) 5 mg tablet 1 Sig: Take 1 Tab by mouth daily. Class: Normal  
 Pharmacy: Ivette Carney [address] [place] [address] Ph #: [phone] Route: Oral  
  
Follow-up Instructions Return in about 3 months (around 11/16/2017) for Medicare Wellness Visit and chronic disease follow up- 45 mins. Nicolas Raven To-Do List   
 08/16/2017 Imaging:  [facility] MAMMO BI SCREENING INCL CAD Around 08/17/2017 Imaging:  DEXA BONE DENSITY STUDY AXIAL   
  
 08/28/2017 11:00 AM  
  Appointment with [facility] INFUSION NURSE 1 at [facility] INFUSION ([phone]) Introducing [facility]! Peng Zhu introduces [employer] patient portal. Now you can access parts of your medical record, email your doctor's office, and request medication refills online. 1. In your internet browser, go to [email or web address] 2. Click on the First Time User? Click Here link in the Sign In box. You will see the New Member Sign Up page. 3. Enter your [employer] Access Code exactly as it appears below. You will not need to use this code after youve completed the sign-up process. If you do not sign up before the expiration date, you must request a new code. · [employer] Access Code: [email or web address] Expires: 10/17/2017  1:41 PM 
 
4. Enter the last four digits of your Social Security Number (xxxx) and Date of Birth (mm/dd/yyyy) as indicated and click Submit. You will be taken to the next sign-up page. 5. Create a [employer] ID. This will be your [employer] login ID and cannot be changed, so think of one that is secure and easy to remember. 6. Create a [employer] password. You can change your password at any time. 7. Enter your Password Reset Question and Answer. This can be used at a later time if you forget your password. 8. Enter your e-mail address. You will receive e-mail notification when new information is available in [address] 9. Click Sign Up. You can now view and download portions of your medical record. 10. Click the Download Summary menu link to download a portable copy of your medical information. If you have questions, please visit the Frequently Asked Questions section of the "[employer] website. Remember, "[employer] is NOT to be used for urgent needs. For medical emergencies, dial 911. Now available from your iPhone and Android! Please provide this summary of care documentation to your next provider. Your primary care clinician is listed as Chauncey Wesley. If you have any questions after today's visit, please call [phone].

## 2017-08-19 PROBLEM — Z12.11 ENCOUNTER FOR SCREENING COLONOSCOPY: Status: RESOLVED | Noted: 2017-07-19 | Resolved: 2017-08-19

## 2017-08-28 ENCOUNTER — HOSPITAL ENCOUNTER (OUTPATIENT)
Dept: INFUSION THERAPY | Age: 72
Discharge: HOME | End: 2017-08-28
Payer: MEDICARE

## 2017-08-28 VITALS
OXYGEN SATURATION: 98 % | RESPIRATION RATE: 18 BRPM | DIASTOLIC BLOOD PRESSURE: 77 MMHG | TEMPERATURE: 98.5 F | SYSTOLIC BLOOD PRESSURE: 137 MMHG | HEART RATE: 86 BPM

## 2017-08-28 LAB
ANION GAP BLD CALC-SCNC: 18 MMOL/L (ref 10–20)
BUN BLD-MCNC: 23 MG/DL (ref 7–18)
CA-I BLD-MCNC: 1.25 MMOL/L (ref 1.12–1.32)
CHLORIDE BLD-SCNC: 103 MMOL/L (ref 100–108)
CO2 BLD-SCNC: 25 MMOL/L (ref 19–24)
CREAT UR-MCNC: 1 MG/DL (ref 0.6–1.3)
GLUCOSE BLD STRIP.AUTO-MCNC: 184 MG/DL (ref 74–106)
HCT VFR BLD CALC: 36 % (ref 36–49)
HGB BLD-MCNC: 12.2 G/DL (ref 12–16)
POTASSIUM BLD-SCNC: 3.8 MMOL/L (ref 3.5–5.5)
SODIUM BLD-SCNC: 141 MMOL/L (ref 136–145)

## 2017-08-28 PROCEDURE — 80047 BASIC METABLC PNL IONIZED CA: CPT

## 2017-08-28 PROCEDURE — 96372 THER/PROPH/DIAG INJ SC/IM: CPT

## 2017-08-28 PROCEDURE — 74011250636 HC RX REV CODE- 250/636: Performed by: NURSE PRACTITIONER

## 2017-08-28 RX ADMIN — DENOSUMAB 60 MG: 60 INJECTION SUBCUTANEOUS at 11:35

## 2017-09-15 ENCOUNTER — PATIENT OUTREACH (OUTPATIENT)
Dept: FAMILY MEDICINE CLINIC | Age: 72
End: 2017-09-15

## 2017-09-15 NOTE — PROGRESS NOTES
NN health screening: Thank you to Amanda Torres for ordering mammogram. Will continue to follow until completed.

## 2017-11-02 ENCOUNTER — PATIENT OUTREACH (OUTPATIENT)
Dept: FAMILY MEDICINE CLINIC | Age: 72
End: 2017-11-02

## 2017-11-02 DIAGNOSIS — E11.9 TYPE 2 DIABETES MELLITUS WITHOUT COMPLICATION, WITHOUT LONG-TERM CURRENT USE OF INSULIN (HCC): ICD-10-CM

## 2017-11-02 DIAGNOSIS — I10 ESSENTIAL HYPERTENSION WITH GOAL BLOOD PRESSURE LESS THAN 130/80: ICD-10-CM

## 2017-11-02 NOTE — TELEPHONE ENCOUNTER
Requested Prescriptions     Pending Prescriptions Disp Refills    losartan (COZAAR) 25 mg tablet 90 Tab 1     Sig: Take 1 Tab by mouth daily.  glipiZIDE (GLUCOTROL) 5 mg tablet 90 Tab 1     Sig: Take 1 Tab by mouth daily.

## 2017-11-03 RX ORDER — LOSARTAN POTASSIUM 25 MG/1
25 TABLET ORAL DAILY
Qty: 90 TAB | Refills: 1 | OUTPATIENT
Start: 2017-11-03

## 2017-11-03 RX ORDER — GLIPIZIDE 5 MG/1
5 TABLET ORAL DAILY
Qty: 90 TAB | Refills: 1 | OUTPATIENT
Start: 2017-11-03

## 2017-11-03 NOTE — TELEPHONE ENCOUNTER
11/3/2017  4:40 PM    Chief Complaint   Patient presents with    Medication Refill       Noted refill request for Losartan and Glipizide. These medications were refilled 8/16/17 for 90 days and one refill. Patient only needs to call pharmacy to get refill.

## 2017-11-14 ENCOUNTER — HOSPITAL ENCOUNTER (OUTPATIENT)
Dept: BONE DENSITY | Age: 72
Discharge: HOME | End: 2017-11-14
Attending: NURSE PRACTITIONER
Payer: MEDICARE

## 2017-11-14 DIAGNOSIS — M81.0 OSTEOPOROSIS, UNSPECIFIED OSTEOPOROSIS TYPE, UNSPECIFIED PATHOLOGICAL FRACTURE PRESENCE: ICD-10-CM

## 2017-11-14 PROCEDURE — 77080 DXA BONE DENSITY AXIAL: CPT

## 2017-11-21 ENCOUNTER — HOSPITAL ENCOUNTER (OUTPATIENT)
Dept: MAMMOGRAPHY | Age: 72
Discharge: HOME | End: 2017-11-21
Attending: NURSE PRACTITIONER
Payer: MEDICARE

## 2017-11-21 DIAGNOSIS — Z12.31 ENCOUNTER FOR SCREENING MAMMOGRAM FOR BREAST CANCER: ICD-10-CM

## 2017-11-21 PROCEDURE — 77063 BREAST TOMOSYNTHESIS BI: CPT

## 2017-11-22 DIAGNOSIS — R92.8 ABNORMALITY OF RIGHT BREAST ON SCREENING MAMMOGRAM: Primary | ICD-10-CM

## 2017-11-22 NOTE — PROGRESS NOTES
11/22/2017  2:38 PM      Noted abnormal 3D screening mammogram of right breast needing further imaging. US and right diagnostic ordered at this time.

## 2017-12-04 ENCOUNTER — PATIENT OUTREACH (OUTPATIENT)
Dept: FAMILY MEDICINE CLINIC | Age: 72
End: 2017-12-04

## 2017-12-04 NOTE — PROGRESS NOTES
health screening:    Noted Ms Ruperto Gale is scheduled for mammogram 12/12/17. Will continue to follow until its completion.

## 2017-12-12 ENCOUNTER — HOSPITAL ENCOUNTER (OUTPATIENT)
Dept: ULTRASOUND IMAGING | Age: 72
Discharge: HOME | End: 2017-12-12
Attending: NURSE PRACTITIONER
Payer: MEDICARE

## 2017-12-12 ENCOUNTER — HOSPITAL ENCOUNTER (OUTPATIENT)
Dept: MAMMOGRAPHY | Age: 72
Discharge: HOME | End: 2017-12-12
Attending: NURSE PRACTITIONER
Payer: MEDICARE

## 2017-12-12 DIAGNOSIS — R92.8 ABNORMALITY OF RIGHT BREAST ON SCREENING MAMMOGRAM: ICD-10-CM

## 2017-12-12 PROCEDURE — 77065 DX MAMMO INCL CAD UNI: CPT

## 2017-12-13 ENCOUNTER — OFFICE VISIT (OUTPATIENT)
Dept: FAMILY MEDICINE CLINIC | Age: 72
End: 2017-12-13

## 2017-12-13 VITALS
HEART RATE: 94 BPM | WEIGHT: 124.8 LBS | BODY MASS INDEX: 21.31 KG/M2 | TEMPERATURE: 97.8 F | SYSTOLIC BLOOD PRESSURE: 121 MMHG | DIASTOLIC BLOOD PRESSURE: 63 MMHG | OXYGEN SATURATION: 96 % | RESPIRATION RATE: 18 BRPM | HEIGHT: 64 IN

## 2017-12-13 DIAGNOSIS — M81.0 OSTEOPOROSIS, UNSPECIFIED OSTEOPOROSIS TYPE, UNSPECIFIED PATHOLOGICAL FRACTURE PRESENCE: ICD-10-CM

## 2017-12-13 DIAGNOSIS — R53.82 CHRONIC FATIGUE: Primary | ICD-10-CM

## 2017-12-13 DIAGNOSIS — E78.1 HYPERTRIGLYCERIDEMIA: ICD-10-CM

## 2017-12-13 DIAGNOSIS — E11.9 TYPE 2 DIABETES MELLITUS WITHOUT COMPLICATION, WITHOUT LONG-TERM CURRENT USE OF INSULIN (HCC): ICD-10-CM

## 2017-12-13 DIAGNOSIS — I10 ESSENTIAL HYPERTENSION: ICD-10-CM

## 2017-12-13 LAB — HBA1C MFR BLD HPLC: 5.5 %

## 2017-12-13 NOTE — PATIENT INSTRUCTIONS
Fatigue: Care Instructions  Your Care Instructions    Fatigue is a feeling of tiredness, exhaustion, or lack of energy. You may feel fatigue because of too much or not enough activity. It can also come from stress, lack of sleep, boredom, and poor diet. Many medical problems, such as viral infections, can cause fatigue. Emotional problems, especially depression, are often the cause of fatigue. Fatigue is most often a symptom of another problem. Treatment for fatigue depends on the cause. For example, if you have fatigue because you have a certain health problem, treating this problem also treats your fatigue. If depression or anxiety is the cause, treatment may help. Follow-up care is a key part of your treatment and safety. Be sure to make and go to all appointments, and call your doctor if you are having problems. It's also a good idea to know your test results and keep a list of the medicines you take. How can you care for yourself at home? · Get regular exercise. But don't overdo it. Go back and forth between rest and exercise. · Get plenty of rest.  · Eat a healthy diet. Do not skip meals, especially breakfast.  · Reduce your use of caffeine, tobacco, and alcohol. Caffeine is most often found in coffee, tea, cola drinks, and chocolate. · Limit medicines that can cause fatigue. This includes tranquilizers and cold and allergy medicines. When should you call for help? Watch closely for changes in your health, and be sure to contact your doctor if:  ? · You have new symptoms such as fever or a rash. ? · Your fatigue gets worse. ? · You have been feeling down, depressed, or hopeless. Or you may have lost interest in things that you usually enjoy. ? · You are not getting better as expected. Where can you learn more? Go to http://ruiz-tino.info/. Enter Y998 in the search box to learn more about \"Fatigue: Care Instructions. \"  Current as of: March 20, 2017  Content Version: 11.4  © 2644-7802 [employer]wise[employer]. Care instructions adapted under license by [employer] (which disclaims liability or warranty for this information). If you have questions about a medical condition or this instruction, always ask your healthcare professional. [address] any warranty or liability for your use of this information.

## 2017-12-13 NOTE — PROGRESS NOTES
HISTORY OF PRESENT ILLNESS  Ubaldo Tyson is a 67 y.o. female. HPI  Ubaldo Tyson is a 67 y.o. female who presents to the office today for HTN. She is here for routine follow up. She has well controlled HTN. DM is also controlled with A1c under 6. She admits to compliance with her medication. She has osteoporosis and had two prolia injections so far. She has not had any falls. She says she does get some low back and shoulder/neck discomfort. She will apply a heating pad to these areas and this helps. She had a DEXA scan done last month. We reviewed the results in the office today. There was a 5.3% increase in BMD of the lumbar spine. T score improved from -3.4 to -3.1 in both femoral necks. She is c/o chronic fatigue to the point where she needs a nap during the day. She also is noticing some hair shaft thinning. No hair loss, but that it looks thinner and less shiny. Her daughter, mother and brother all have thyroid disease. Chief Complaint   Patient presents with    Follow Up Chronic Condition     HTN    Joint Pain     IN HIPS, KNEES, AND SHOUILDERS FOR 2 MONTHS AFTER THE SECOND PROLIA INJECTION    Nail Problem     PT REPORTS THAT SHE HAS BEEN NOTICING RIDGES IN HER NAILS FOR 6-90MONTHS       Current Outpatient Prescriptions on File Prior to Visit   Medication Sig Dispense Refill    metFORMIN (GLUCOPHAGE) 1,000 mg tablet Take 1 Tab by mouth two (2) times daily (with meals). 180 Tab 1    losartan (COZAAR) 25 mg tablet Take 1 Tab by mouth daily. 90 Tab 1    glipiZIDE (GLUCOTROL) 5 mg tablet Take 1 Tab by mouth daily. 90 Tab 1    COQ10, UBIQUINOL, PO Take  by mouth.  Calcium-Cholecalciferol, D3, (CALCIUM 500 + D) 500 mg(1,250mg) -400 unit tab Take 1 Tab by mouth two (2) times a day. 180 Tab 4    denosumab (PROLIA) 60 mg/mL injection 1 mL by SubCUTAneous route every 6 months. To be done at [facility].  1 Syringe 1     No current facility-administered medications on file prior to visit. Allergies   Allergen Reactions    Aspralum [Aspirin, Buffered] Other (comments)     Causes abdominal cramping     Past Medical History:   Diagnosis Date    Cataract 04/2017    bilateral     [facility]     Diabetic retinopathy of both eyes ([facility] 04/2017    Hypertension     Macular degeneration 9/2015    Osteoporosis 09/2015    on Prolia starting 2/2017     History   Smoking Status    Never Smoker   Smokeless Tobacco    Never Used     History   Alcohol Use No     Family History   Problem Relation Age of Onset    Heart Disease Mother     Heart Disease Father      Review of Systems   Constitutional: Positive for malaise/fatigue. Negative for chills, diaphoresis, fever and weight loss. Eyes: Negative for blurred vision and double vision. Respiratory: Negative for cough and shortness of breath. Cardiovascular: Negative for chest pain, palpitations and leg swelling. Gastrointestinal: Negative for abdominal pain, constipation, diarrhea, nausea and vomiting. Genitourinary: Negative for frequency. Musculoskeletal: Positive for joint pain and myalgias. Negative for falls. Chronic- not worsening   Skin: Negative for rash. Neurological: Negative for dizziness, tingling, focal weakness and headaches. Endo/Heme/Allergies: Negative for polydipsia. Does not bruise/bleed easily. Psychiatric/Behavioral: Negative for depression. The patient has insomnia. The patient is not nervous/anxious. Chronic insomnia since menopause     Visit Vitals    /63 (BP 1 Location: Right arm, BP Patient Position: Sitting)    Pulse 94    Temp 97.8 °F (36.6 °C) (Oral)    Resp 18    Ht 5' 4\" (1.626 m)    Wt 124 lb 12.8 oz (56.6 kg)    SpO2 96%    BMI 21.42 kg/m2     Physical Exam   Constitutional: She is oriented to person, place, and time. She appears well-developed and well-nourished. No distress. Neck: Neck supple. No thyromegaly present. Cardiovascular: Normal rate, regular rhythm and normal heart sounds. Pulmonary/Chest: Effort normal and breath sounds normal. No respiratory distress. She has no wheezes. She has no rales. Musculoskeletal: She exhibits no edema. Lymphadenopathy:     She has no cervical adenopathy. Neurological: She is alert and oriented to person, place, and time. Gait normal.   Psychiatric: She has a normal mood and affect. Her behavior is normal. Thought content normal.   Nursing note and vitals reviewed. Recent Results (from the past 12 hour(s))   AMB POC HEMOGLOBIN A1C    Collection Time: 12/13/17 12:45 PM   Result Value Ref Range    Hemoglobin A1c (POC) 5.5 %     ASSESSMENT and PLAN    ICD-10-CM ICD-9-CM    1. Chronic fatigue R53.82 780.79 CBC WITH AUTOMATED DIFF      METABOLIC PANEL, COMPREHENSIVE      TSH AND FREE T4      VITAMIN D, 25 HYDROXY   2. Osteoporosis, unspecified osteoporosis type, unspecified pathological fracture presence M81.0 733.00 CBC WITH AUTOMATED DIFF      METABOLIC PANEL, COMPREHENSIVE      TSH AND FREE T4      VITAMIN D, 25 HYDROXY   3. Type 2 diabetes mellitus without complication, without long-term current use of insulin (HCC) E11.9 250.00 CBC WITH AUTOMATED DIFF      METABOLIC PANEL, COMPREHENSIVE      AMB POC HEMOGLOBIN A1C      LIPID PANEL   4. Essential hypertension I10 401.9 CBC WITH AUTOMATED DIFF      METABOLIC PANEL, COMPREHENSIVE      TSH AND FREE T4      LIPID PANEL   5. Hypertriglyceridemia E78.1 272.1 LIPID PANEL      A1c is 5.5. No change to current regimen. Will check HTN and DM labs. F/u in 3 months. Also checking labs for chronic fatigue, Vit D and calcium levels. No changes to medication regimen at this time. Reviewed medication and side effects. Patient agrees with the plan and verbalizes understanding. Follow-up Disposition:  Return in about 3 months (around 3/13/2018) for DM, HTN, osteoporosis.     Alejandro Willis PA-C  12/13/2017

## 2017-12-13 NOTE — MR AVS SNAPSHOT
Visit Information Date & Time Provider Department Dept. Phone Encounter #  
 12/13/2017 12:30 PM [facility] [phone] [id number] Follow-up Instructions Return in about 3 months (around 3/13/2018) for DM, HTN, osteoporosis. Upcoming Health Maintenance Date Due DTaP/Tdap/Td series (1 - Tdap) 2/15/1965 HEMOGLOBIN A1C Q6M 11/2/2017 MEDICARE YEARLY EXAM 11/3/2017 FOOT EXAM Q1 2/2/2018 MICROALBUMIN Q1 2/6/2018 LIPID PANEL Q1 2/6/2018 EYE EXAM RETINAL OR DILATED Q1 4/5/2018 GLAUCOMA SCREENING Q2Y 4/5/2019 Bone Densitometry 11/14/2019 COLONOSCOPY 7/21/2027 Allergies as of 12/13/2017  Review Complete On: 12/13/2017 By: Fran Landaverde LPN Severity Noted Reaction Type Reactions Aspralum [Aspirin, Buffered] High 05/02/2017   Intolerance Other (comments) Causes abdominal cramping Current Immunizations  Reviewed on 8/28/2017 Name Date Influenza High Dose Vaccine PF 9/5/2017, 11/2/2016 Influenza Vaccine 9/18/2015 Pneumococcal Conjugate (PCV-13) 11/2/2016 Pneumococcal Polysaccharide (PPSV-23) 4/17/2015  3:28 PM  
 Zoster Vaccine, Live 1/1/2015 Not reviewed this visit You Were Diagnosed With   
  
 Codes Comments Chronic fatigue    -  Primary ICD-10-CM: R53.82 
ICD-9-CM: 780.79 Osteoporosis, unspecified osteoporosis type, unspecified pathological fracture presence     ICD-10-CM: M81.0 ICD-9-CM: 733.00 Type 2 diabetes mellitus without complication, without long-term current use of insulin (HCC)     ICD-10-CM: E11.9 ICD-9-CM: 250.00 Essential hypertension     ICD-10-CM: I10 
ICD-9-CM: 401.9 Hypertriglyceridemia     ICD-10-CM: E78.1 ICD-9-CM: 272.1 Vitals BP Pulse Temp Resp Height(growth percentile) Weight(growth percentile)  121/63 (BP 1 Location: Right arm, BP Patient Position: Sitting) 94 97.8 °F (36.6 °C) (Oral) 18 5' 4\" (1.626 m) 124 lb 12.8 oz (56.6 kg) SpO2 BMI OB Status Smoking Status 96% 21.42 kg/m2 Postmenopausal Never Smoker Vitals History BMI and BSA Data Body Mass Index Body Surface Area  
 21.42 kg/m 2 1.6 m 2 Preferred Pharmacy Pharmacy Name Phone Alexandro Corona [address] [facility] [phone] Your Updated Medication List  
  
   
This list is accurate as of: 12/13/17  1:01 PM.  Always use your most recent med list.  
  
  
  
  
 Calcium-Cholecalciferol (D3) 500 mg(1,250mg) -400 unit Tab Commonly known as:  CALCIUM 500 + D Take 1 Tab by mouth two (2) times a day. COQ10 (UBIQUINOL) PO Take  by mouth. denosumab 60 mg/mL injection Commonly known as:  Tonna Corning 1 mL by SubCUTAneous route every 6 months. To be done at [facility]. glipiZIDE 5 mg tablet Commonly known as:  Gene Hanly Take 1 Tab by mouth daily. losartan 25 mg tablet Commonly known as:  COZAAR Take 1 Tab by mouth daily. metFORMIN 1,000 mg tablet Commonly known as:  GLUCOPHAGE Take 1 Tab by mouth two (2) times daily (with meals). We Performed the Following AMB POC HEMOGLOBIN A1C [08262 CPT(R)] Follow-up Instructions Return in about 3 months (around 3/13/2018) for DM, HTN, osteoporosis. To-Do List   
 12/13/2017 Lab:  CBC WITH AUTOMATED DIFF   
  
 12/13/2017 Lab:  LIPID PANEL   
  
 12/13/2017 Lab:  METABOLIC PANEL, COMPREHENSIVE   
  
 12/13/2017 Lab:  TSH AND FREE T4   
  
 12/13/2017 Lab:  VITAMIN D, 25 HYDROXY   
  
 02/26/2018 11:00 AM  
  Appointment with [facility] NURSE 1 at [facility] ([phone]) Patient Instructions Fatigue: Care Instructions Your Care Instructions Fatigue is a feeling of tiredness, exhaustion, or lack of energy.  You may feel fatigue because of too much or not enough activity. It can also come from stress, lack of sleep, boredom, and poor diet. Many medical problems, such as viral infections, can cause fatigue. Emotional problems, especially depression, are often the cause of fatigue. Fatigue is most often a symptom of another problem. Treatment for fatigue depends on the cause. For example, if you have fatigue because you have a certain health problem, treating this problem also treats your fatigue. If depression or anxiety is the cause, treatment may help. Follow-up care is a key part of your treatment and safety. Be sure to make and go to all appointments, and call your doctor if you are having problems. It's also a good idea to know your test results and keep a list of the medicines you take. How can you care for yourself at home? · Get regular exercise. But don't overdo it. Go back and forth between rest and exercise. · Get plenty of rest. 
· Eat a healthy diet. Do not skip meals, especially breakfast. 
· Reduce your use of caffeine, tobacco, and alcohol. Caffeine is most often found in coffee, tea, cola drinks, and chocolate. · Limit medicines that can cause fatigue. This includes tranquilizers and cold and allergy medicines. When should you call for help? Watch closely for changes in your health, and be sure to contact your doctor if: 
? · You have new symptoms such as fever or a rash. ? · Your fatigue gets worse. ? · You have been feeling down, depressed, or hopeless. Or you may have lost interest in things that you usually enjoy. ? · You are not getting better as expected. Where can you learn more? Go to http://ruiz-tino.info/. Enter F761 in the search box to learn more about \"Fatigue: Care Instructions. \" Current as of: March 20, 2017 Content Version: 11.4 © 1387-9772 Healthwise, Incorporated.  Care instructions adapted under license by [address] (which disclaims liability or warranty for this information). If you have questions about a medical condition or this instruction, always ask your healthcare professional. [address] any warranty or liability for your use of this information. Introducing [facility] & HEALTH SERVICES! [facility] introduces ???? patient portal. Now you can access parts of your medical record, email your doctor's office, and request medication refills online. 1. In your internet browser, go to [email or web address] 2. Click on the First Time User? Click Here link in the Sign In box. You will see the New Member Sign Up page. 3. Enter your ???? Access Code exactly as it appears below. You will not need to use this code after youve completed the sign-up process. If you do not sign up before the expiration date, you must request a new code. · ???? Access Code: [email or web address] Expires: 1/31/2018 10:27 AM 
 
4. Enter the last four digits of your Social Security Number (xxxx) and Date of Birth (mm/dd/yyyy) as indicated and click Submit. You will be taken to the next sign-up page. 5. Create a ???? ID. This will be your ???? login ID and cannot be changed, so think of one that is secure and easy to remember. 6. Create a ???? password. You can change your password at any time. 7. Enter your Password Reset Question and Answer. This can be used at a later time if you forget your password. 8. Enter your e-mail address. You will receive e-mail notification when new information is available in [address] 9. Click Sign Up. You can now view and download portions of your medical record. 10. Click the Download Summary menu link to download a portable copy of your medical information. If you have questions, please visit the Frequently Asked Questions section of the ???? website.  Remember, ???? is NOT to be used for urgent needs. For medical emergencies, dial 911. Now available from your iPhone and Android! Please provide this summary of care documentation to your next provider. Your primary care clinician is listed as Letty Aquino. If you have any questions after today's visit, please call [phone].

## 2017-12-22 ENCOUNTER — PATIENT OUTREACH (OUTPATIENT)
Dept: FAMILY MEDICINE CLINIC | Age: 72
End: 2017-12-22

## 2018-01-22 DIAGNOSIS — I10 ESSENTIAL HYPERTENSION WITH GOAL BLOOD PRESSURE LESS THAN 130/80: ICD-10-CM

## 2018-01-22 DIAGNOSIS — E11.9 TYPE 2 DIABETES MELLITUS WITHOUT COMPLICATION, WITHOUT LONG-TERM CURRENT USE OF INSULIN (HCC): ICD-10-CM

## 2018-01-22 NOTE — TELEPHONE ENCOUNTER
Requested Prescriptions     Pending Prescriptions Disp Refills    glipiZIDE (GLUCOTROL) 5 mg tablet 90 Tab 1     Sig: Take 1 Tab by mouth daily.  metFORMIN (GLUCOPHAGE) 1,000 mg tablet 180 Tab 1     Sig: Take 1 Tab by mouth two (2) times daily (with meals).  losartan (COZAAR) 25 mg tablet 90 Tab 1     Sig: Take 1 Tab by mouth daily.

## 2018-01-24 DIAGNOSIS — E11.9 TYPE 2 DIABETES MELLITUS WITHOUT COMPLICATION, WITHOUT LONG-TERM CURRENT USE OF INSULIN (HCC): ICD-10-CM

## 2018-01-24 DIAGNOSIS — I10 ESSENTIAL HYPERTENSION WITH GOAL BLOOD PRESSURE LESS THAN 130/80: ICD-10-CM

## 2018-01-25 NOTE — TELEPHONE ENCOUNTER
1/25/2018  2:10 PM    Chief Complaint   Patient presents with    Medication Refill       Noted refill request for below medications. Last office visit with me 8/2017 and has been seeing DARREL Cutler at [facility].- last seen in 12/2017 and upcoming 3/2017. Forwarded to DARREL Cutler for consideration to refills.        Requested Prescriptions     Pending Prescriptions Disp Refills    metFORMIN (GLUCOPHAGE) 1,000 mg tablet [Pharmacy Med Name: metFORMIN HCL 1,000MG TABLET, UU] 180 Tab 0     Sig: TAKE ONE TABLET BY MOUTH TWICE A DAY WITH MEALS    losartan (COZAAR) 25 mg tablet [Pharmacy Med Name: LOSARTAN POTASSIUM 25 MG TAB] 90 Tab 0     Sig: TAKE ONE TABLET BY MOUTH DAILY    glipiZIDE (GLUCOTROL) 5 mg tablet [Pharmacy Med Name: glipiZIDE 5 MG TABLET] 90 Tab 0     Sig: TAKE ONE TABLET BY MOUTH DAILY

## 2018-01-30 RX ORDER — GLIPIZIDE 5 MG/1
5 TABLET ORAL DAILY
Qty: 90 TAB | Refills: 1 | Status: SHIPPED | OUTPATIENT
Start: 2018-01-30 | End: 2018-04-06 | Stop reason: SDUPTHER

## 2018-01-30 RX ORDER — GLIPIZIDE 5 MG/1
TABLET ORAL
Qty: 90 TAB | Refills: 0 | OUTPATIENT
Start: 2018-01-30

## 2018-01-30 RX ORDER — LOSARTAN POTASSIUM 25 MG/1
25 TABLET ORAL DAILY
Qty: 90 TAB | Refills: 1 | Status: SHIPPED | OUTPATIENT
Start: 2018-01-30 | End: 2018-04-06 | Stop reason: SDUPTHER

## 2018-01-30 RX ORDER — METFORMIN HYDROCHLORIDE 1000 MG/1
TABLET ORAL
Qty: 180 TAB | Refills: 0 | OUTPATIENT
Start: 2018-01-30

## 2018-01-30 RX ORDER — LOSARTAN POTASSIUM 25 MG/1
TABLET ORAL
Qty: 90 TAB | Refills: 0 | OUTPATIENT
Start: 2018-01-30

## 2018-01-30 RX ORDER — METFORMIN HYDROCHLORIDE 1000 MG/1
1000 TABLET ORAL 2 TIMES DAILY WITH MEALS
Qty: 180 TAB | Refills: 1 | Status: SHIPPED | OUTPATIENT
Start: 2018-01-30 | End: 2018-04-06 | Stop reason: SDUPTHER

## 2018-02-26 ENCOUNTER — HOSPITAL ENCOUNTER (OUTPATIENT)
Dept: INFUSION THERAPY | Age: 73
End: 2018-02-26

## 2018-03-02 ENCOUNTER — HOSPITAL ENCOUNTER (OUTPATIENT)
Dept: INFUSION THERAPY | Age: 73
Discharge: HOME | End: 2018-03-02
Payer: MEDICARE

## 2018-03-02 VITALS
DIASTOLIC BLOOD PRESSURE: 101 MMHG | RESPIRATION RATE: 18 BRPM | TEMPERATURE: 98 F | HEART RATE: 90 BPM | OXYGEN SATURATION: 98 % | SYSTOLIC BLOOD PRESSURE: 174 MMHG

## 2018-03-02 LAB
ANION GAP BLD CALC-SCNC: 16 MMOL/L (ref 10–20)
BUN BLD-MCNC: 16 MG/DL (ref 7–18)
CA-I BLD-MCNC: 1.26 MMOL/L (ref 1.12–1.32)
CHLORIDE BLD-SCNC: 105 MMOL/L (ref 100–108)
CO2 BLD-SCNC: 26 MMOL/L (ref 19–24)
CREAT UR-MCNC: 1 MG/DL (ref 0.6–1.3)
GLUCOSE BLD STRIP.AUTO-MCNC: 97 MG/DL (ref 74–106)
HCT VFR BLD CALC: 35 % (ref 36–49)
HGB BLD-MCNC: 11.9 G/DL (ref 12–16)
MAGNESIUM SERPL-MCNC: 1.5 MG/DL (ref 1.6–2.6)
PHOSPHATE SERPL-MCNC: 2.6 MG/DL (ref 2.5–4.9)
POTASSIUM BLD-SCNC: 3.6 MMOL/L (ref 3.5–5.5)
SODIUM BLD-SCNC: 143 MMOL/L (ref 136–145)

## 2018-03-02 PROCEDURE — 36415 COLL VENOUS BLD VENIPUNCTURE: CPT

## 2018-03-02 PROCEDURE — 83735 ASSAY OF MAGNESIUM: CPT | Performed by: NURSE PRACTITIONER

## 2018-03-02 PROCEDURE — 84100 ASSAY OF PHOSPHORUS: CPT | Performed by: NURSE PRACTITIONER

## 2018-03-02 PROCEDURE — 80047 BASIC METABLC PNL IONIZED CA: CPT

## 2018-03-02 PROCEDURE — 96372 THER/PROPH/DIAG INJ SC/IM: CPT

## 2018-03-02 PROCEDURE — 74011250636 HC RX REV CODE- 250/636: Performed by: NURSE PRACTITIONER

## 2018-03-02 RX ADMIN — DENOSUMAB 60 MG: 60 INJECTION SUBCUTANEOUS at 10:40

## 2018-03-02 NOTE — PROGRESS NOTES
[facility] Progress Note    Date: 2018    Name: Emanuel Skinner    MRN: [id number]         : 1945     PROLIA INJECTION      Ms. Winston Boyd to [facility], ambulatory at 1025. Pt was assessed and education was provided. Ms. Glenys Springer vitals were reviewed and patient was observed for 5 minutes prior to treatment. Visit Vitals    BP (!) 174/101 (BP 1 Location: Left arm, BP Patient Position: Sitting)    Pulse 90    Temp 98 °F (36.7 °C)    Resp 18    SpO2 98%    Breastfeeding No       Blood obtained peripherally from the right AC without difficulty and BMP, Mag, Phos run per written orders. No bleeding or hematoma noted at site. Guaze and tape applied. Lab results were obtained and reviewed. Recent Results (from the past 12 hour(s))   POC CHEM8    Collection Time: 18 10:38 AM   Result Value Ref Range    CO2, POC 26 (H) 19 - 24 MMOL/L    Glucose, POC 97 74 - 106 MG/DL    BUN, POC 16 7 - 18 MG/DL    Creatinine, POC 1.0 0.6 - 1.3 MG/DL    GFRAA, POC >60 >60 ml/min/1.73m2    GFRNA, POC 54 (L) >60 ml/min/1.73m2    Sodium,  136 - 145 MMOL/L    Potassium, POC 3.6 3.5 - 5.5 MMOL/L    Calcium, ionized (POC) 1.26 1.12 - 1.32 MMOL/L    Chloride,  100 - 108 MMOL/L    Anion gap, POC 16 10 - 20      Hematocrit, POC 35 (L) 36 - 49 %    Hemoglobin, POC 11.9 (L) 12 - 16 G/DL     Results within ordered parameters to give PROLIA today. Prolia 60 mg was administered subcutaneous in the back of the right arm. No irritation or bleeding noted at site. Bandaid applied. Pt was observed for 30 minutes post injection. No reaction was noted. Ms. Winston Boyd tolerated well, and had no complaints. Patient armband removed and shredded. Ms. Winston Boyd was discharged from [place] [address] in stable condition at 1045. She is to return on 2018 at 56 for her next Prolia appointment.     Gavin Tripp RN  2018

## 2018-04-06 ENCOUNTER — OFFICE VISIT (OUTPATIENT)
Dept: FAMILY MEDICINE CLINIC | Age: 73
End: 2018-04-06

## 2018-04-06 VITALS
OXYGEN SATURATION: 98 % | HEIGHT: 64 IN | TEMPERATURE: 98.9 F | HEART RATE: 101 BPM | WEIGHT: 125 LBS | SYSTOLIC BLOOD PRESSURE: 127 MMHG | DIASTOLIC BLOOD PRESSURE: 74 MMHG | BODY MASS INDEX: 21.34 KG/M2 | RESPIRATION RATE: 16 BRPM

## 2018-04-06 DIAGNOSIS — M25.40 JOINT SWELLING: ICD-10-CM

## 2018-04-06 DIAGNOSIS — M25.50 MULTIPLE JOINT PAIN: ICD-10-CM

## 2018-04-06 DIAGNOSIS — E11.9 TYPE 2 DIABETES MELLITUS WITHOUT COMPLICATION, WITHOUT LONG-TERM CURRENT USE OF INSULIN (HCC): Primary | ICD-10-CM

## 2018-04-06 DIAGNOSIS — I10 ESSENTIAL HYPERTENSION WITH GOAL BLOOD PRESSURE LESS THAN 130/80: ICD-10-CM

## 2018-04-06 PROBLEM — E11.3599 TYPE 2 DIABETES MELLITUS WITH PROLIFERATIVE RETINOPATHY (HCC): Status: ACTIVE | Noted: 2018-04-06

## 2018-04-06 RX ORDER — GLIPIZIDE 5 MG/1
5 TABLET ORAL DAILY
Qty: 90 TAB | Refills: 1 | Status: SHIPPED | OUTPATIENT
Start: 2018-04-06 | End: 2018-07-02 | Stop reason: SDUPTHER

## 2018-04-06 RX ORDER — METFORMIN HYDROCHLORIDE 1000 MG/1
1000 TABLET ORAL 2 TIMES DAILY WITH MEALS
Qty: 180 TAB | Refills: 1 | Status: SHIPPED | OUTPATIENT
Start: 2018-04-06 | End: 2018-07-02 | Stop reason: SDUPTHER

## 2018-04-06 RX ORDER — LOSARTAN POTASSIUM 25 MG/1
25 TABLET ORAL DAILY
Qty: 90 TAB | Refills: 1 | Status: SHIPPED | OUTPATIENT
Start: 2018-04-06 | End: 2018-07-02 | Stop reason: SDUPTHER

## 2018-04-06 NOTE — PROGRESS NOTES
Chief Complaint   Patient presents with    Osteoporosis     follow up    Diabetes     follow up     1. Have you been to the ER, urgent care clinic since your last visit? Hospitalized since your last visit? No    2. Have you seen or consulted any other health care providers outside of the [address] [place] [address] since your last visit? Include any pap smears or colon screening.  No

## 2018-04-06 NOTE — PROGRESS NOTES
HISTORY OF PRESENT ILLNESS  Neha Mast is a 75 y.o. female. HPI  Neha Mast is a 75 y.o. female who presents to the office today for DM and osteoporosis. She continues Prolia injections for OA. She states since starting the injections, she gets multiple joint aches, right index finger, shoulders, hips, knees. She did not get labs done that were ordered at her last visit. DM- well controlled on current regime. She sees Dr. Niya Cisse for Ophtho. I recently sent in a request to her pharmacy to add a statin. BP is controlled with losartan. She needs refills of her medication. Chief Complaint   Patient presents with    Osteoporosis     follow up    Diabetes     follow up     Current Outpatient Prescriptions   Medication Sig    VIT C/E/ZN/COPPR/LUTEIN/ZEAXAN (PRESERVISION AREDS 2 PO) Take  by mouth.  glipiZIDE (GLUCOTROL) 5 mg tablet Take 1 Tab by mouth daily.  metFORMIN (GLUCOPHAGE) 1,000 mg tablet Take 1 Tab by mouth two (2) times daily (with meals).  losartan (COZAAR) 25 mg tablet Take 1 Tab by mouth daily.  COQ10, UBIQUINOL, PO Take  by mouth.  Calcium-Cholecalciferol, D3, (CALCIUM 500 + D) 500 mg(1,250mg) -400 unit tab Take 1 Tab by mouth two (2) times a day.  denosumab (PROLIA) 60 mg/mL injection 1 mL by SubCUTAneous route every 6 months. To be done at [facility]. No current facility-administered medications for this visit.       Allergies   Allergen Reactions    Aspralum [Aspirin, Buffered] Other (comments)     Causes abdominal cramping     Past Medical History:   Diagnosis Date    Cataract 04/2017    bilateral     Diabetes ([address]     Diabetic retinopathy of both eyes ([address] 04/2017    Hypertension     Macular degeneration 9/2015    Osteoporosis 09/2015    on Prolia starting 2/2017     History   Smoking Status    Never Smoker   Smokeless Tobacco    Never Used     History   Alcohol Use No     Family History   Problem Relation Age of Onset    Heart Disease Mother     Heart Disease Father      Review of Systems   Constitutional: Positive for malaise/fatigue. Negative for chills, diaphoresis, fever and weight loss. Eyes: Negative for blurred vision. Respiratory: Negative for cough and shortness of breath. Cardiovascular: Negative for chest pain, palpitations and leg swelling. Gastrointestinal: Negative for abdominal pain, constipation, diarrhea, nausea and vomiting. Genitourinary: Negative for frequency. Musculoskeletal: Positive for joint pain. Negative for falls. Chronic- right index finger, shoulders, knees, hips   Skin: Negative for rash. Neurological: Negative for dizziness, focal weakness, loss of consciousness and headaches. Endo/Heme/Allergies: Negative for polydipsia. Does not bruise/bleed easily. Psychiatric/Behavioral: Negative for depression. The patient has insomnia. The patient is not nervous/anxious. Chronic insomnia since menopause     Visit Vitals    /74 (BP 1 Location: Left arm, BP Patient Position: Sitting)    Pulse (!) 101    Temp 98.9 °F (37.2 °C) (Oral)    Resp 16    Ht 5' 4\" (1.626 m)    Wt 125 lb (56.7 kg)    SpO2 98%  Comment: room air    BMI 21.46 kg/m2     Physical Exam   Constitutional: She is oriented to person, place, and time. She appears well-developed and well-nourished. No distress. Cardiovascular: Normal rate, regular rhythm and normal heart sounds. Pulmonary/Chest: Effort normal and breath sounds normal. No respiratory distress. She has no wheezes. She has no rales. Musculoskeletal: She exhibits no edema. Hands:  Neurological: She is alert and oriented to person, place, and time. She has normal strength. Gait normal.   Skin: Skin is warm and dry. Psychiatric: She has a normal mood and affect. Her behavior is normal. Thought content normal.   Nursing note and vitals reviewed. ASSESSMENT and PLAN    ICD-10-CM ICD-9-CM    1.  Type 2 diabetes mellitus without complication, without long-term current use of insulin (HCC) E11.9 250.00 glipiZIDE (GLUCOTROL) 5 mg tablet      metFORMIN (GLUCOPHAGE) 1,000 mg tablet      CANCELED: AMB POC URINE, MICROALBUMIN, SEMIQUANTITATIVE   2. Multiple joint pain M25.50 719.49 RHEUMATOID FACTOR, QL   3. Joint swelling M25.40 719.00 RHEUMATOID FACTOR, QL   4. Essential hypertension with goal blood pressure less than 130/80 I10 401.9 losartan (COZAAR) 25 mg tablet      DM controlled. Will come in Monday for POC urine microalbumin. Will request eye exam from Dr. Javon Christopher. She will get labs done that were ordered in December. I am adding RF. BP well controlled. No change to medication regimen. Reviewed medication and side effects. Patient agrees with the plan and verbalizes understanding. Follow-up Disposition:  Return in about 3 months (around 7/6/2018) for DM, HLD, HTN.     Nancy Ku PA-C  4/6/2018

## 2018-04-06 NOTE — MR AVS SNAPSHOT
[address] [facility] [id number] [phone] Patient: Shaheed Argueta MRN: [id number] RYA:[id number] Visit Information Date & Time Provider Department Dept. Phone Encounter #  
 4/6/2018  2:30 PM [facility] [phone] [id number] Follow-up Instructions Return in about 3 months (around 7/6/2018) for DM, HLD, HTN. Upcoming Health Maintenance Date Due DTaP/Tdap/Td series (1 - Tdap) 2/15/1965 FOOT EXAM Q1 2/2/2018 MICROALBUMIN Q1 2/6/2018 LIPID PANEL Q1 2/6/2018 MEDICARE YEARLY EXAM 3/14/2018 EYE EXAM RETINAL OR DILATED Q1 4/5/2018 HEMOGLOBIN A1C Q6M 6/13/2018 GLAUCOMA SCREENING Q2Y 4/5/2019 Bone Densitometry 11/14/2019 BREAST CANCER SCRN MAMMOGRAM 12/12/2019 COLONOSCOPY 7/21/2027 Allergies as of 4/6/2018  Review Complete On: 4/6/2018 By: Golden Duenas PA-C Severity Noted Reaction Type Reactions Aspralum [Aspirin, Buffered] High 05/02/2017   Intolerance Other (comments) Causes abdominal cramping Current Immunizations  Reviewed on 8/28/2017 Name Date Influenza High Dose Vaccine PF 9/5/2017, 11/2/2016 Influenza Vaccine 9/18/2015 Pneumococcal Conjugate (PCV-13) 11/2/2016 Pneumococcal Polysaccharide (PPSV-23) 4/17/2015  3:28 PM  
 Zoster Vaccine, Live 1/1/2015 Not reviewed this visit You Were Diagnosed With   
  
 Codes Comments Type 2 diabetes mellitus without complication, without long-term current use of insulin (HCC)    -  Primary ICD-10-CM: E11.9 ICD-9-CM: 250.00 Multiple joint pain     ICD-10-CM: M25.50 ICD-9-CM: 719.49   
 Joint swelling     ICD-10-CM: M25.40 ICD-9-CM: 719.00 Essential hypertension with goal blood pressure less than 130/80     ICD-10-CM: I10 
ICD-9-CM: 401.9 Vitals BP Pulse Temp Resp Height(growth percentile) Weight(growth percentile) 127/74 (BP 1 Location: Left arm, BP Patient Position: Sitting) (!) 101 98.9 °F (37.2 °C) (Oral) 16 5' 4\" (1.626 m) 125 lb (56.7 kg) SpO2 BMI OB Status Smoking Status 98% 21.46 kg/m2 Postmenopausal Never Smoker Vitals History BMI and BSA Data Body Mass Index Body Surface Area  
 21.46 kg/m 2 1.6 m 2 Preferred Pharmacy Pharmacy Name Phone [facility] [address] [facility] [phone] Your Updated Medication List  
  
   
This list is accurate as of 4/6/18  3:09 PM.  Always use your most recent med list.  
  
  
  
  
 Calcium-Cholecalciferol (D3) 500 mg(1,250mg) -400 unit Tab Commonly known as:  CALCIUM 500 + D Take 1 Tab by mouth two (2) times a day. COQ10 (UBIQUINOL) PO Take  by mouth. denosumab 60 mg/mL injection Commonly known as:  King Jamilabinder 1 mL by SubCUTAneous route every 6 months. To be done at [facility]. glipiZIDE 5 mg tablet Commonly known as:  Gracia Braxton Take 1 Tab by mouth daily. losartan 25 mg tablet Commonly known as:  COZAAR Take 1 Tab by mouth daily. metFORMIN 1,000 mg tablet Commonly known as:  GLUCOPHAGE Take 1 Tab by mouth two (2) times daily (with meals). PRESERVISION AREDS 2 PO Take  by mouth. Prescriptions Sent to Pharmacy Refills  
 glipiZIDE (GLUCOTROL) 5 mg tablet 1 Sig: Take 1 Tab by mouth daily. Class: Normal  
 Pharmacy: [facility] [address] Ph #: [phone] Route: Oral  
 metFORMIN (GLUCOPHAGE) 1,000 mg tablet 1 Sig: Take 1 Tab by mouth two (2) times daily (with meals). Class: Normal  
 Pharmacy: [facility] [address] Ph #: [phone] Route: Oral  
 losartan (COZAAR) 25 mg tablet 1 Sig: Take 1 Tab by mouth daily.   
 Class: Normal  
 Pharmacy: Ivette Carney [address] [place] [address], [address] [place] [address] #: [phone] Route: Oral  
  
Follow-up Instructions Return in about 3 months (around 7/6/2018) for DM, HLD, HTN. To-Do List   
 04/06/2018 Lab:  RHEUMATOID FACTOR, QL   
  
 09/07/2018 10:30 AM  
  Appointment with [facility] FAST TRACK NURSE at [facility] ([phone]) Introducing [facility] & [facility] SERVICES! Peng [employer] introduces Tunezy patient portal. Now you can access parts of your medical record, email your doctor's office, and request medication refills online. 1. In your internet browser, go to [email or web address] 2. Click on the First Time User? Click Here link in the Sign In box. You will see the New Member Sign Up page. 3. Enter your Tunezy Access Code exactly as it appears below. You will not need to use this code after youve completed the sign-up process. If you do not sign up before the expiration date, you must request a new code. · Tunezy Access Code: [email or web address] Expires: 5/22/2018  1:47 PM 
 
4. Enter the last four digits of your Social Security Number (xxxx) and Date of Birth (mm/dd/yyyy) as indicated and click Submit. You will be taken to the next sign-up page. 5. Create a Tunezy ID. This will be your Tunezy login ID and cannot be changed, so think of one that is secure and easy to remember. 6. Create a Tunezy password. You can change your password at any time. 7. Enter your Password Reset Question and Answer. This can be used at a later time if you forget your password. 8. Enter your e-mail address. You will receive e-mail notification when new information is available in [address] 9. Click Sign Up. You can now view and download portions of your medical record. 10. Click the Download Summary menu link to download a portable copy of your medical information.  
 
If you have questions, please visit the Frequently Asked Questions section of the [employer]. Remember, Tricyclet is NOT to be used for urgent needs. For medical emergencies, dial 911. Now available from your iPhone and Android! Please provide this summary of care documentation to your next provider. Your primary care clinician is listed as Phys Other. If you have any questions after today's visit, please call [phone].

## 2018-07-02 DIAGNOSIS — E11.9 TYPE 2 DIABETES MELLITUS WITHOUT COMPLICATION, WITHOUT LONG-TERM CURRENT USE OF INSULIN (HCC): ICD-10-CM

## 2018-07-02 DIAGNOSIS — I10 ESSENTIAL HYPERTENSION WITH GOAL BLOOD PRESSURE LESS THAN 130/80: ICD-10-CM

## 2018-07-02 NOTE — TELEPHONE ENCOUNTER
Requested Prescriptions     Pending Prescriptions Disp Refills    metFORMIN (GLUCOPHAGE) 1,000 mg tablet 180 Tab 1     Sig: Take 1 Tab by mouth two (2) times daily (with meals).  losartan (COZAAR) 25 mg tablet 90 Tab 1     Sig: Take 1 Tab by mouth daily.  glipiZIDE (GLUCOTROL) 5 mg tablet 90 Tab 1     Sig: Take 1 Tab by mouth daily.

## 2018-07-05 RX ORDER — METFORMIN HYDROCHLORIDE 1000 MG/1
1000 TABLET ORAL 2 TIMES DAILY WITH MEALS
Qty: 60 TAB | Refills: 0 | Status: SHIPPED | OUTPATIENT
Start: 2018-07-05 | End: 2018-10-03 | Stop reason: SDUPTHER

## 2018-07-05 RX ORDER — LOSARTAN POTASSIUM 25 MG/1
25 TABLET ORAL DAILY
Qty: 30 TAB | Refills: 0 | Status: SHIPPED | OUTPATIENT
Start: 2018-07-05 | End: 2018-10-03 | Stop reason: SDUPTHER

## 2018-07-05 RX ORDER — GLIPIZIDE 5 MG/1
5 TABLET ORAL DAILY
Qty: 30 TAB | Refills: 0 | Status: SHIPPED | OUTPATIENT
Start: 2018-07-05 | End: 2018-10-03 | Stop reason: SDUPTHER

## 2018-09-05 ENCOUNTER — TELEPHONE (OUTPATIENT)
Dept: FAMILY MEDICINE CLINIC | Age: 73
End: 2018-09-05

## 2018-09-05 NOTE — TELEPHONE ENCOUNTER
Breana Carranza from [facility] calling requesting a new order for patient coming in Friday 9/7/18. She says Shelly Cedillo is the referring provider. I explained this patient has not seen Shelly Cedillo in over a year.  (8/16/17 at [facility]    [phone]

## 2018-09-07 ENCOUNTER — HOSPITAL ENCOUNTER (OUTPATIENT)
Dept: INFUSION THERAPY | Age: 73
End: 2018-09-07

## 2018-09-11 ENCOUNTER — TELEPHONE (OUTPATIENT)
Dept: FAMILY MEDICINE CLINIC | Age: 73
End: 2018-09-11

## 2018-09-13 ENCOUNTER — HOSPITAL ENCOUNTER (OUTPATIENT)
Dept: INFUSION THERAPY | Age: 73
End: 2018-09-13

## 2018-10-03 ENCOUNTER — OFFICE VISIT (OUTPATIENT)
Dept: FAMILY MEDICINE CLINIC | Age: 73
End: 2018-10-03

## 2018-10-03 VITALS
RESPIRATION RATE: 12 BRPM | OXYGEN SATURATION: 97 % | WEIGHT: 119.6 LBS | HEART RATE: 85 BPM | SYSTOLIC BLOOD PRESSURE: 164 MMHG | DIASTOLIC BLOOD PRESSURE: 80 MMHG | TEMPERATURE: 98.6 F | BODY MASS INDEX: 20.42 KG/M2 | HEIGHT: 64 IN

## 2018-10-03 DIAGNOSIS — I10 ESSENTIAL HYPERTENSION: ICD-10-CM

## 2018-10-03 DIAGNOSIS — E11.311 DIABETIC MACULAR EDEMA (HCC): ICD-10-CM

## 2018-10-03 DIAGNOSIS — M81.0 AGE-RELATED OSTEOPOROSIS WITHOUT CURRENT PATHOLOGICAL FRACTURE: ICD-10-CM

## 2018-10-03 DIAGNOSIS — R80.9 TYPE 2 DIABETES MELLITUS WITH MICROALBUMINURIA, WITHOUT LONG-TERM CURRENT USE OF INSULIN (HCC): Primary | ICD-10-CM

## 2018-10-03 DIAGNOSIS — E11.3593 PROLIFERATIVE DIABETIC RETINOPATHY OF BOTH EYES ASSOCIATED WITH TYPE 2 DIABETES MELLITUS, UNSPECIFIED PROLIFERATIVE RETINOPATHY TYPE (HCC): ICD-10-CM

## 2018-10-03 DIAGNOSIS — E11.29 TYPE 2 DIABETES MELLITUS WITH MICROALBUMINURIA, WITHOUT LONG-TERM CURRENT USE OF INSULIN (HCC): Primary | ICD-10-CM

## 2018-10-03 DIAGNOSIS — Z23 ENCOUNTER FOR IMMUNIZATION: ICD-10-CM

## 2018-10-03 DIAGNOSIS — E55.9 VITAMIN D INSUFFICIENCY: ICD-10-CM

## 2018-10-03 DIAGNOSIS — R53.83 FATIGUE, UNSPECIFIED TYPE: ICD-10-CM

## 2018-10-03 PROBLEM — E11.319 DIABETIC RETINOPATHY OF BOTH EYES (HCC): Status: ACTIVE | Noted: 2017-04-01

## 2018-10-03 PROBLEM — E11.3599 TYPE 2 DIABETES MELLITUS WITH PROLIFERATIVE RETINOPATHY (HCC): Status: RESOLVED | Noted: 2018-04-06 | Resolved: 2018-10-03

## 2018-10-03 RX ORDER — METFORMIN HYDROCHLORIDE 1000 MG/1
1000 TABLET ORAL 2 TIMES DAILY WITH MEALS
Qty: 90 TAB | Refills: 1 | Status: SHIPPED | OUTPATIENT
Start: 2018-10-03 | End: 2018-10-05 | Stop reason: SDUPTHER

## 2018-10-03 RX ORDER — GLIPIZIDE 5 MG/1
5 TABLET ORAL DAILY
Qty: 90 TAB | Refills: 1 | Status: SHIPPED | OUTPATIENT
Start: 2018-10-03 | End: 2019-06-07 | Stop reason: SDUPTHER

## 2018-10-03 RX ORDER — LOSARTAN POTASSIUM 25 MG/1
25 TABLET ORAL DAILY
Qty: 90 TAB | Refills: 1 | Status: SHIPPED | OUTPATIENT
Start: 2018-10-03 | End: 2019-06-07 | Stop reason: SDUPTHER

## 2018-10-03 NOTE — PROGRESS NOTES
Chief Complaint   Patient presents with    [facility] Care    Diabetes    Hypertension    Cholesterol Problem    Other     Macular Degeneration    Osteoporosis    Medication Refill     Patient is not fasting. Patient in room # 5.     1. Have you been to the ER, urgent care clinic since your last visit? Hospitalized since your last visit? No    2. Have you seen or consulted any other health care providers outside of the [facility] since your last visit? Include any pap smears or colon screening. No    HM Reviewed. Flu due. Flowsheet, Learning needs, Fall, and PHQ completed. Upcoming Appt. Dr. Colette Ballard, Eye Provider, Macular Degeneration    Verbal order for in office high dose flu per JOHNNY Michel/Lissy Monteiro LPN    César Crespo is a 75 y.o. female who presents for routine immunizations. She denies any symptoms , reactions or allergies that would exclude them from being immunized today. Risks and adverse reactions were discussed and the VIS was given to them. All questions were addressed. She was observed for 10 min post injection. There were no reactions observed. Rico Sultana LPN    Requested Prescriptions     Pending Prescriptions Disp Refills    metFORMIN (GLUCOPHAGE) 1,000 mg tablet 60 Tab 0     Sig: Take 1 Tab by mouth two (2) times daily (with meals).  losartan (COZAAR) 25 mg tablet 30 Tab 0     Sig: Take 1 Tab by mouth daily.  glipiZIDE (GLUCOTROL) 5 mg tablet 30 Tab 0     Sig: Take 1 Tab by mouth daily.

## 2018-10-03 NOTE — MR AVS SNAPSHOT
[address] 
[phone] Patient: Sam Klein MRN: [id number] Visit Information Date & Time Provider Department Dept. Phone Encounter #  
 10/3/2018 10:00 AM Kennedy Fox, [address] [place] [address] Road [id number] Follow-up Instructions Return in about 3 months (around 1/3/2019) for annual medicare wellness/routine care and please have fasting labs done 3-7 days prior. Upcoming Health Maintenance Date Due DTaP/Tdap/Td series (1 - Tdap) 2/15/1965 Shingrix Vaccine Age 50> (1 of 2) 2/15/1994 FOOT EXAM Q1 2/2/2018 MICROALBUMIN Q1 2/6/2018 LIPID PANEL Q1 2/6/2018 HEMOGLOBIN A1C Q6M 6/13/2018 Influenza Age 5 to Adult 8/1/2018 MEDICARE YEARLY EXAM 12/14/2018 EYE EXAM RETINAL OR DILATED Q1 4/5/2019 Bone Densitometry 11/14/2019 BREAST CANCER SCRN MAMMOGRAM 12/12/2019 GLAUCOMA SCREENING Q2Y 4/5/2020 COLONOSCOPY 7/21/2027 Allergies as of 10/3/2018  Review Complete On: 10/3/2018 By: JOHNNY Garcia Severity Noted Reaction Type Reactions Aspralum [Aspirin, Buffered] High 05/02/2017   Intolerance Other (comments) Causes abdominal cramping Current Immunizations  Reviewed on 8/28/2017 Name Date Influenza High Dose Vaccine PF 9/5/2017, 11/2/2016 Influenza Vaccine 9/18/2015 Influenza Vaccine (Tri) Adjuvanted  Incomplete Pneumococcal Conjugate (PCV-13) 11/2/2016 Pneumococcal Polysaccharide (PPSV-23) 4/17/2015  3:28 PM  
 Zoster Vaccine, Live 1/1/2015 Not reviewed this visit You Were Diagnosed With   
  
 Codes Comments Type 2 diabetes mellitus with microalbuminuria, without long-term current use of insulin (HCC)    -  Primary ICD-10-CM: E11.29, R80.9 ICD-9-CM: 250.40, 791.0  Proliferative diabetic retinopathy of both eyes associated with type 2 diabetes mellitus, unspecified proliferative retinopathy type ([facility]     ICD-10-CM: R85.5627 ICD-9-CM: 250.50, 362.02 Diabetic macular edema (HCC)     ICD-10-CM: A32.959 ICD-9-CM: 250.50, 362.07, 362.01 Essential hypertension     ICD-10-CM: I10 
ICD-9-CM: 401.9 Age-related osteoporosis without current pathological fracture     ICD-10-CM: M81.0 ICD-9-CM: 733.01 Vitamin D insufficiency     ICD-10-CM: E55.9 ICD-9-CM: 268.9 Fatigue, unspecified type     ICD-10-CM: R53.83 ICD-9-CM: 780.79 Encounter for immunization     ICD-10-CM: W50 ICD-9-CM: V03.89 Vitals BP Pulse Temp Resp Height(growth percentile) Weight(growth percentile) 164/80 (BP 1 Location: Right arm, BP Patient Position: Sitting) 85 98.6 °F (37 °C) (Oral) 12 5' 4\" (1.626 m) 119 lb 9.6 oz (54.3 kg) SpO2 BMI OB Status Smoking Status 97% 20.53 kg/m2 Postmenopausal Never Smoker Vitals History BMI and BSA Data Body Mass Index Body Surface Area 20.53 kg/m 2 1.57 m 2 Preferred Pharmacy Pharmacy Name Phone [facility], [address] [place] [address] [phone] Your Updated Medication List  
  
   
This list is accurate as of 10/3/18 10:55 AM.  Always use your most recent med list.  
  
  
  
  
 Calcium-Cholecalciferol (D3) 500 mg(1,250mg) -400 unit Tab Commonly known as:  CALCIUM 500 + D Take 1 Tab by mouth two (2) times a day. COQ10 (UBIQUINOL) PO Take  by mouth. denosumab 60 mg/mL injection Commonly known as:  Ganesh Chahal 1 mL by SubCUTAneous route every 6 months. To be done at [facility]. glipiZIDE 5 mg tablet Commonly known as:  Loli Gema Take 1 Tab by mouth daily. losartan 25 mg tablet Commonly known as:  COZAAR Take 1 Tab by mouth daily. metFORMIN 1,000 mg tablet Commonly known as:  GLUCOPHAGE Take 1 Tab by mouth two (2) times daily (with meals). PRESERVISION AREDS 2 PO Take  by mouth. Prescriptions Sent to Pharmacy Refills  
 metFORMIN (GLUCOPHAGE) 1,000 mg tablet 1 Sig: Take 1 Tab by mouth two (2) times daily (with meals). Class: Normal  
 Pharmacy: [facility] [address], [address] [place] [address] Ph #: [phone] Route: Oral  
 losartan (COZAAR) 25 mg tablet 1 Sig: Take 1 Tab by mouth daily. Class: Normal  
 Pharmacy: [facility] [address], [address] [place] [address] Ph #: [phone] Route: Oral  
 glipiZIDE (GLUCOTROL) 5 mg tablet 1 Sig: Take 1 Tab by mouth daily. Class: Normal  
 Pharmacy: [facility] [address], [address] [place] [address] Ph #: [phone] Route: Oral  
  
We Performed the Following ADMIN INFLUENZA VIRUS VAC [ [facility]] INFLUENZA VACCINE INACTIVATED (IIV), SUBUNIT, ADJUVANTED, IM [id number] CPT(R)] Follow-up Instructions Return in about 3 months (around 1/3/2019) for annual medicare wellness/routine care and please have fasting labs done 3-7 days prior. To-Do List   
 10/03/2018 Lab:  HEMOGLOBIN A1C W/O EAG   
  
 10/03/2018 Lab:  METABOLIC PANEL, COMPREHENSIVE   
  
 10/03/2018 Lab:  MICROALBUMIN, UR, RAND W/ MICROALB/CREAT RATIO   
  
 10/03/2018 Lab:  VITAMIN D, 25 HYDROXY Around 10/04/2018 Lab:  CBC WITH AUTOMATED DIFF Around 10/04/2018 Lab:  LIPID PANEL Patient Instructions Vaccine Information Statement Influenza (Flu) Vaccine (Inactivated or Recombinant): What you need to know Many Vaccine Information Statements are available in [place] and other languages. See www.immunize.org/vis Hojas de Información Sobre Vacunas están disponibles en Español y en muchos otros idiomas. Visite www.immunize.org/vis 1. Why get vaccinated? Influenza (flu) is a contagious disease that spreads around the [place] every year, usually between October and May. Flu is caused by influenza viruses, and is spread mainly by coughing, sneezing, and close contact. Anyone can get flu. Flu strikes suddenly and can last several days. Symptoms vary by age, but can include: 
 fever/chills  sore throat  muscle aches  fatigue  cough  headache  runny or stuffy nose Flu can also lead to pneumonia and blood infections, and cause diarrhea and seizures in children. If you have a medical condition, such as heart or lung disease, flu can make it worse. Flu is more dangerous for some people. Infants and young children, people 72years of age and older, pregnant women, and people with certain health conditions or a weakened immune system are at greatest risk. Each year thousands of people in the [facility] die from flu, and many more are hospitalized. Flu vaccine can: 
 keep you from getting flu, 
 make flu less severe if you do get it, and 
 keep you from spreading flu to your family and other people. 2. Inactivated and recombinant flu vaccines A dose of flu vaccine is recommended every flu season. Children 6 months through 6years of age may need two doses during the same flu season. Everyone else needs only one dose each flu season. Some inactivated flu vaccines contain a very small amount of a mercury-based preservative called thimerosal. Studies have not shown thimerosal in vaccines to be harmful, but flu vaccines that do not contain thimerosal are available. There is no live flu virus in flu shots. They cannot cause the flu. There are many flu viruses, and they are always changing. Each year a new flu vaccine is made to protect against three or four viruses that are likely to cause disease in the upcoming flu season. But even when the vaccine doesnt exactly match these viruses, it may still provide some protection Flu vaccine cannot prevent: 
 flu that is caused by a virus not covered by the vaccine, or 
  illnesses that look like flu but are not. It takes about 2 weeks for protection to develop after vaccination, and protection lasts through the flu season. 3. Some people should not get this vaccine Tell the person who is giving you the vaccine:  If you have any severe, life-threatening allergies. If you ever had a life-threatening allergic reaction after a dose of flu vaccine, or have a severe allergy to any part of this vaccine, you may be advised not to get vaccinated. Most, but not all, types of flu vaccine contain a small amount of egg protein.  If you ever had Guillain-Barré Syndrome (also called GBS). Some people with a history of GBS should not get this vaccine. This should be discussed with your doctor.  If you are not feeling well. It is usually okay to get flu vaccine when you have a mild illness, but you might be asked to come back when you feel better. 4. Risks of a vaccine reaction With any medicine, including vaccines, there is a chance of reactions. These are usually mild and go away on their own, but serious reactions are also possible. Most people who get a flu shot do not have any problems with it. Minor problems following a flu shot include:  
 soreness, redness, or swelling where the shot was given  hoarseness  sore, red or itchy eyes  cough  fever  aches  headache  itching  fatigue If these problems occur, they usually begin soon after the shot and last 1 or 2 days. More serious problems following a flu shot can include the following:  There may be a small increased risk of Guillain-Barré Syndrome (GBS) after inactivated flu vaccine. This risk has been estimated at 1 or 2 additional cases per million people vaccinated. This is much lower than the risk of severe complications from flu, which can be prevented by flu vaccine.    
 
 Young children who get the flu shot along with pneumococcal vaccine (PCV13) and/or DTaP vaccine at the same time might be slightly more likely to have a seizure caused by fever. Ask your doctor for more information. Tell your doctor if a child who is getting flu vaccine has ever had a seizure. Problems that could happen after any injected vaccine:  People sometimes faint after a medical procedure, including vaccination. Sitting or lying down for about 15 minutes can help prevent fainting, and injuries caused by a fall. Tell your doctor if you feel dizzy, or have vision changes or ringing in the ears.  Some people get severe pain in the shoulder and have difficulty moving the arm where a shot was given. This happens very rarely.  Any medication can cause a severe allergic reaction. Such reactions from a vaccine are very rare, estimated at about 1 in a million doses, and would happen within a few minutes to a few hours after the vaccination. As with any medicine, there is a very remote chance of a vaccine causing a serious injury or death. The safety of vaccines is always being monitored. For more information, visit: www.cdc.gov/vaccinesafety/ 
 
 
The [facility] Vaccine Injury Compensation Program (VICP) is a federal program that was created to compensate people who may have been injured by certain vaccines. Persons who believe they may have been injured by a vaccine can learn about the program and about filing a claim by calling 2-549.957.5588 or visiting the [employer] website at [email or web address]/vaccinecompensation. There is a time limit to file a claim for compensation. 7. How can I learn more?  Ask your healthcare provider. He or she can give you the vaccine package insert or suggest other sources of information.  Call your local or state health department.  Contact the Centers for Disease Control and Prevention (CDC): 
- Call 1-459.888.9516 (8-289-SLJ-INFO) or 
- Visit CDCs website at www.cdc.gov/flu Vaccine Information Statement Inactivated Influenza Vaccine 8/7/2015 
[address] [facility] and stylemarks Centers for Disease Control and Prevention Office Use Only Follow up in 3 months for annual medicare wellness/routine care and please have fasting labs done 3-7 days prior Introducing \[facility] [place] [facility]\"" & HEALTH SERVICES! [employer] introduces [employer] patient portal. Now you can access parts of your medical record, email your doctor's office, and request medication refills online. 1. In your internet browser, go to [email or web address] 2. Click on the First Time User? Click Here link in the Sign In box. You will see the New Member Sign Up page. 3. Enter your [employer] Access Code exactly as it appears below. You will not need to use this code after youve completed the sign-up process. If you do not sign up before the expiration date, you must request a new code. · [employer] Access Code: [email or web address] Expires: 12/3/2018 10:18 AM 
 
 4. Enter the last four digits of your Social Security Number (xxxx) and Date of Birth (mm/dd/yyyy) as indicated and click Submit. You will be taken to the next sign-up page. 5. Create a [employer] ID. This will be your [employer] login ID and cannot be changed, so think of one that is secure and easy to remember. 6. Create a [employer] password. You can change your password at any time. 7. Enter your Password Reset Question and Answer. This can be used at a later time if you forget your password. 8. Enter your e-mail address. You will receive e-mail notification when new information is available in [address] 9. Click Sign Up. You can now view and download portions of your medical record. 10. Click the Download Summary menu link to download a portable copy of your medical information. If you have questions, please visit the Frequently Asked Questions section of the [employer] website. Remember, [employer] is NOT to be used for urgent needs. For medical emergencies, dial 911. Now available from your iPhone and Android! Please provide this summary of care documentation to your next provider. Your primary care clinician is listed as Araceli Ahumada. If you have any questions after today's visit, please call [phone].

## 2018-10-03 NOTE — PROGRESS NOTES
Patient: Sara Godinez MRN: [id number]  SSN: [id number]    YOB: 1945  Age: 75 y.o. Sex: female      Date of Service: 10/3/2018   Provider: JOHNNY Prado   Office Location:   [address] [place] [address] [address]  [place], [address]  Office Phone: [phone]  Office Fax: [phone]        REASON FOR VISIT:   Chief Complaint   Patient presents with    [facility] Care    Diabetes    Hypertension    Cholesterol Problem    Other     Macular Degeneration    Osteoporosis    Medication Refill        VITALS:   Visit Vitals    /80 (BP 1 Location: Right arm, BP Patient Position: Sitting)  Comment: manual    Pulse 85    Temp 98.6 °F (37 °C) (Oral)    Resp 12    Ht 5' 4\" (1.626 m)    Wt 119 lb 9.6 oz (54.3 kg)    SpO2 97%    BMI 20.53 kg/m2       MEDICATIONS:   Current Outpatient Prescriptions   Medication Sig Dispense Refill    VIT C/E/ZN/COPPR/LUTEIN/ZEAXAN (PRESERVISION AREDS 2 PO) Take  by mouth.  COQ10, UBIQUINOL, PO Take  by mouth.  Calcium-Cholecalciferol, D3, (CALCIUM 500 + D) 500 mg(1,250mg) -400 unit tab Take 1 Tab by mouth two (2) times a day. 180 Tab 4    denosumab (PROLIA) 60 mg/mL injection 1 mL by SubCUTAneous route every 6 months. To be done at [facility]. 1 Syringe 1    metFORMIN (GLUCOPHAGE) 1,000 mg tablet Take 1 Tab by mouth two (2) times daily (with meals). 60 Tab 0    losartan (COZAAR) 25 mg tablet Take 1 Tab by mouth daily. 30 Tab 0    glipiZIDE (GLUCOTROL) 5 mg tablet Take 1 Tab by mouth daily.  30 Tab 0        ALLERGIES:   Allergies   Allergen Reactions    Aspralum [Aspirin, Buffered] Other (comments)     Causes abdominal cramping        MEDICAL/SURGICAL HISTORY:  Past Medical History:   Diagnosis Date    Cataract 04/2017    bilateral     Diabetes ([address]     Diabetic retinopathy of both eyes ([address] 04/2017    Hypertension     Macular degeneration 9/2015    Osteoporosis 09/2015    on Prolia starting 2/2017      Past Surgical History:   Procedure Laterality Date    COLONOSCOPY N/A 7/19/2017    COLONOSCOPY performed by Duyen Lawrence MD at [address]  12/2016    right eye        FAMILY HISTORY:  Family History   Problem Relation Age of Onset    Heart Disease Mother     Heart Disease Father     Other Brother      brain aneurysm        SOCIAL HISTORY:  Social History   Substance Use Topics    Smoking status: Never Smoker    Smokeless tobacco: Never Used    Alcohol use No            HISTORY OF PRESENT ILLNESS:   Trinh Charles is a 75 y.o. female who presents to the office to establish care as a new patient    Diabetes -  Currently the patient's condition is stable, well controlled. Last A1c: 5.5% on 12/13/17   Patient is prescribed the following regimen: metformin 1,000 mg BID, glipizide 5 mg daily, however she has been out of her meds x about 2 months due to her previous PCP office closing. Home glucose readings: have been higher since she was off her meds (highest she's seen was 145)    Last urine microalbumin: 2/6/17, mild microalbuminuria noted. Patient is on ARB therapy. Last lipid panel: 2/6/17, LDL 88.6 at that time. Patient is not on statin therapy  Last foot exam: 2/2/17  Last eye exam: 4/5/18. Follows closely with Dr. Wild Song for DM retinopathy with macular edema    Hypertension -   Currently, the patient's condition is poorly controlled. She is prescribed losartan 25 mg daily, but has been out of her meds x about 2 mo. Home BP readings: not checking     Osteoporosis -   Diagnosed on screening DEXA 9/2015. Patient was originally treated with Fosamax, but was unable to tolerate, and has since been switched to Prolia infusions q 6 mo. She is a bit overdue, and needs a new order for the infusion center.    She also takes calcium and vitamin D   Her Repeat DEXA 11/2017 showed a 5.3% in crease in the lumbar BMD, and less significant increases in the distal radius and b/l proximal femurs. Health Maintenance -   Previous PCP - Jasen Romero NP at [address] [place] [address] with routine labs - Last routine visit was 4/6/18. Labs last done 2/2017  Last pap smear - N/A due to age  Last mammogram - 12/12/17, bi-rads 1   Last DEXA scan - 11/14/17, see above re: osteoporosis   Last colonoscopy - 7/21/17, repeat in 10 years or as needed   Last flu shot - 12/13/17  Last pneumonia vaccine - had Prevnar 13 11/2/16, Pneumovax 23 4/17/15  Last Tdap booster - unknown     REVIEW OF SYSTEMS:  ROS (see scanned H&P form for complete 12 point ROS)    PHYSICAL EXAMINATION:  Physical Exam   Constitutional: She is oriented to person, place, and time and well-developed, well-nourished, and in no distress. HENT:   Head: Normocephalic and atraumatic. Right Ear: External ear normal.   Left Ear: External ear normal.   Nose: Nose normal.   Eyes: Conjunctivae are normal. Pupils are equal, round, and reactive to light. Right eye exhibits no discharge. Left eye exhibits no discharge. Neck: Neck supple. Cardiovascular: Normal rate, regular rhythm and normal heart sounds. Exam reveals no gallop and no friction rub. No murmur heard. Pulmonary/Chest: Effort normal and breath sounds normal. No stridor. She has no wheezes. She has no rales. Lymphadenopathy:     She has no cervical adenopathy. Neurological: She is alert and oriented to person, place, and time. Gait normal.   Skin: Skin is warm and dry. No rash noted. Psychiatric: Mood, memory and affect normal.        RESULTS:  No results found for this visit on 10/03/18. ASSESSMENT/PLAN:  Diagnoses and all orders for this visit:    1. Type 2 diabetes mellitus with microalbuminuria, without long-term current use of insulin (HCC)  - Historically well controlled, but has been off meds x 2 months  - Update fasting labs and urine micro ASAP  - Meds refilled.  I anticipate that we may be able to discontinue the glipizide at some point if her A1c remains at goal  Orders:    -     METABOLIC PANEL, COMPREHENSIVE; Future  -     LIPID PANEL; Future  -     HEMOGLOBIN A1C W/O EAG; Future  -     MICROALBUMIN, UR, RAND W/ MICROALB/CREAT RATIO; Future  -     metFORMIN (GLUCOPHAGE) 1,000 mg tablet; Take 1 Tab by mouth two (2) times daily (with meals). -     losartan (COZAAR) 25 mg tablet; Take 1 Tab by mouth daily.  -     glipiZIDE (GLUCOTROL) 5 mg tablet; Take 1 Tab by mouth daily. 2. Proliferative diabetic retinopathy of both eyes associated with type 2 diabetes mellitus, unspecified proliferative retinopathy type ([facility]  3. Diabetic macular edema [facility])  - Ophthalmology records reviewed  - Continue with plan of care per eye doctor     4. Essential hypertension  - BP elevated today, but patient has been off losartan  - Meds refilled   Orders:    -     losartan (COZAAR) 25 mg tablet; Take 1 Tab by mouth daily. 5. Age-related osteoporosis without current pathological fracture  - Has done well with Prolia so far   - Will contact infusion center to see what is needed to get patient back in her q 6 mo Prolia infusions. Explained that I am a bit unfamiliar with ordering this. I did discuss with her previous PCP who suggested contacting the infusion center  Orders:    -     VITAMIN D, 25 HYDROXY; Future    6. Vitamin D insufficiency  - Check Vit D level  Orders:    -     VITAMIN D, 25 HYDROXY; Future    7. Fatigue, unspecified type  - Chronic, check CBC   Orders:    -     CBC WITH AUTOMATED DIFF; Future    8. Encounter for immunization  - Flu shot administered today with patient's consent. VIS provided.    Orders:    -     Influenza Vaccine Inactivated (IIV)(FLUAD), Subunit, Adjuvanted, IM, (68406)  -     Administration fee () for Medicare insured patients          Follow-up Disposition:  Return in about 3 months (around 1/3/2019) for annual medicare wellness/routine care and please have fasting labs done 3-7 days prior.        PATIENT CARE TEAM:   Patient Care Team:  JOHNNY Quinones as PCP - General (Physician Assistant)  Tonia Lopez MD (Ophthalmology)       JOHNNY Quinones   October 3, 2018   10:53 AM

## 2018-10-03 NOTE — PATIENT INSTRUCTIONS
Vaccine Information Statement    Influenza (Flu) Vaccine (Inactivated or Recombinant): What you need to know    Many Vaccine Information Statements are available in [place] and other languages. See www.immunize.org/vis  Hojas de Información Sobre Vacunas están disponibles en Español y en muchos otros idiomas. Visite www.immunize.org/vis    1. Why get vaccinated? Influenza (flu) is a contagious disease that spreads around the [place] every year, usually between October and May. Flu is caused by influenza viruses, and is spread mainly by coughing, sneezing, and close contact. Anyone can get flu. Flu strikes suddenly and can last several days. Symptoms vary by age, but can include:   fever/chills   sore throat   muscle aches   fatigue   cough   headache    runny or stuffy nose    Flu can also lead to pneumonia and blood infections, and cause diarrhea and seizures in children. If you have a medical condition, such as heart or lung disease, flu can make it worse. Flu is more dangerous for some people. Infants and young children, people 72years of age and older, pregnant women, and people with certain health conditions or a weakened immune system are at greatest risk. Each year thousands of people in the [facility] die from flu, and many more are hospitalized. Flu vaccine can:   keep you from getting flu,   make flu less severe if you do get it, and   keep you from spreading flu to your family and other people. 2. Inactivated and recombinant flu vaccines    A dose of flu vaccine is recommended every flu season. Children 6 months through 6years of age may need two doses during the same flu season. Everyone else needs only one dose each flu season.        Some inactivated flu vaccines contain a very small amount of a mercury-based preservative called thimerosal. Studies have not shown thimerosal in vaccines to be harmful, but flu vaccines that do not contain thimerosal are available. There is no live flu virus in flu shots. They cannot cause the flu. There are many flu viruses, and they are always changing. Each year a new flu vaccine is made to protect against three or four viruses that are likely to cause disease in the upcoming flu season. But even when the vaccine doesnt exactly match these viruses, it may still provide some protection    Flu vaccine cannot prevent:   flu that is caused by a virus not covered by the vaccine, or   illnesses that look like flu but are not. It takes about 2 weeks for protection to develop after vaccination, and protection lasts through the flu season. 3. Some people should not get this vaccine    Tell the person who is giving you the vaccine:     If you have any severe, life-threatening allergies. If you ever had a life-threatening allergic reaction after a dose of flu vaccine, or have a severe allergy to any part of this vaccine, you may be advised not to get vaccinated. Most, but not all, types of flu vaccine contain a small amount of egg protein.  If you ever had Guillain-Barré Syndrome (also called GBS). Some people with a history of GBS should not get this vaccine. This should be discussed with your doctor.  If you are not feeling well. It is usually okay to get flu vaccine when you have a mild illness, but you might be asked to come back when you feel better. 4. Risks of a vaccine reaction    With any medicine, including vaccines, there is a chance of reactions. These are usually mild and go away on their own, but serious reactions are also possible. Most people who get a flu shot do not have any problems with it.      Minor problems following a flu shot include:    soreness, redness, or swelling where the shot was given     hoarseness   sore, red or itchy eyes   cough   fever   aches   headache   itching   fatigue  If these problems occur, they usually begin soon after the shot and last 1 or 2 days. More serious problems following a flu shot can include the following:     There may be a small increased risk of Guillain-Barré Syndrome (GBS) after inactivated flu vaccine. This risk has been estimated at 1 or 2 additional cases per million people vaccinated. This is much lower than the risk of severe complications from flu, which can be prevented by flu vaccine.  Young children who get the flu shot along with pneumococcal vaccine (PCV13) and/or DTaP vaccine at the same time might be slightly more likely to have a seizure caused by fever. Ask your doctor for more information. Tell your doctor if a child who is getting flu vaccine has ever had a seizure. Problems that could happen after any injected vaccine:      People sometimes faint after a medical procedure, including vaccination. Sitting or lying down for about 15 minutes can help prevent fainting, and injuries caused by a fall. Tell your doctor if you feel dizzy, or have vision changes or ringing in the ears.  Some people get severe pain in the shoulder and have difficulty moving the arm where a shot was given. This happens very rarely.  Any medication can cause a severe allergic reaction. Such reactions from a vaccine are very rare, estimated at about 1 in a million doses, and would happen within a few minutes to a few hours after the vaccination. As with any medicine, there is a very remote chance of a vaccine causing a serious injury or death. The safety of vaccines is always being monitored. For more information, visit: www.cdc.gov/vaccinesafety/    5. What if there is a serious reaction? What should I look for?  Look for anything that concerns you, such as signs of a severe allergic reaction, very high fever, or unusual behavior.     Signs of a severe allergic reaction can include hives, swelling of the face and throat, difficulty breathing, a fast heartbeat, dizziness, and weakness - usually within a few minutes to a few hours after the vaccination. What should I do?  If you think it is a severe allergic reaction or other emergency that cant wait, call 9-1-1 and get the person to the nearest hospital. Otherwise, call your doctor.  Reactions should be reported to the Vaccine Adverse Event Reporting System (VAERS). Your doctor should file this report, or you can do it yourself through  the VAERS web site at www.vaers. [facility].gov, or by calling 3-712.829.9374. VAERS does not give medical advice. 6. The National Vaccine Injury Compensation Program    The [facility] Vaccine Injury Compensation Program (VICP) is a federal program that was created to compensate people who may have been injured by certain vaccines. Persons who believe they may have been injured by a vaccine can learn about the program and about filing a claim by calling 0-525.695.3448 or visiting the [employer] website at www.[facility].gov/vaccinecompensation. There is a time limit to file a claim for compensation. 7. How can I learn more?  Ask your healthcare provider. He or she can give you the vaccine package insert or suggest other sources of information.  Call your local or state health department.  Contact the Centers for Disease Control and Prevention (CDC):  - Call 6-665.560.6054 (1-800-CDC-INFO) or  - Visit CDCs website at www.cdc.gov/flu    Vaccine Information Statement   Inactivated Influenza Vaccine   8/7/2015  42 GEORGINA Pearson [id number]    Department of Health and Human Services  Centers for Disease Control and Prevention    Office Use Only    Follow up in 3 months for annual medicare wellness/routine care and please have fasting labs done 3-7 days prior

## 2018-10-05 DIAGNOSIS — R80.9 TYPE 2 DIABETES MELLITUS WITH MICROALBUMINURIA, WITHOUT LONG-TERM CURRENT USE OF INSULIN (HCC): ICD-10-CM

## 2018-10-05 DIAGNOSIS — E11.29 TYPE 2 DIABETES MELLITUS WITH MICROALBUMINURIA, WITHOUT LONG-TERM CURRENT USE OF INSULIN (HCC): ICD-10-CM

## 2018-10-05 RX ORDER — METFORMIN HYDROCHLORIDE 1000 MG/1
1000 TABLET ORAL 2 TIMES DAILY WITH MEALS
Qty: 180 TAB | Refills: 0 | Status: SHIPPED | OUTPATIENT
Start: 2018-10-05 | End: 2019-08-01 | Stop reason: SDUPTHER

## 2018-10-05 NOTE — TELEPHONE ENCOUNTER
Refill request sent to provider for approval or denial. Last refill on 10/03/2018. Last office visit on 10/03/2018 Next appointment on 01/03/2019.

## 2018-10-05 NOTE — TELEPHONE ENCOUNTER
This patient contacted office for the following prescriptions to be filled:     Medication requested :   Requested Prescriptions     Pending Prescriptions Disp Refills    metFORMIN (GLUCOPHAGE) 1,000 mg tablet 90 Tab 1     Sig: Take 1 Tab by mouth two (2) times daily (with meals).      PCP: Lili Melendez St or Print: Driss  Mail order or Local pharmacy: [phone]    Scheduled appointment if not seen by current providers in office: LOV 10/3/18 - pt received scripts at her appointment and needed a 90 day script and she only received #90 tabs instead of #180

## 2018-12-18 DIAGNOSIS — R80.9 TYPE 2 DIABETES MELLITUS WITH MICROALBUMINURIA, WITHOUT LONG-TERM CURRENT USE OF INSULIN (HCC): ICD-10-CM

## 2018-12-18 DIAGNOSIS — I10 ESSENTIAL HYPERTENSION: ICD-10-CM

## 2018-12-18 DIAGNOSIS — E11.29 TYPE 2 DIABETES MELLITUS WITH MICROALBUMINURIA, WITHOUT LONG-TERM CURRENT USE OF INSULIN (HCC): ICD-10-CM

## 2018-12-18 RX ORDER — LOSARTAN POTASSIUM 25 MG/1
25 TABLET ORAL DAILY
Qty: 90 TAB | Refills: 1 | Status: CANCELLED | OUTPATIENT
Start: 2018-12-18

## 2018-12-18 RX ORDER — GLIPIZIDE 5 MG/1
5 TABLET ORAL DAILY
Qty: 90 TAB | Refills: 1 | Status: CANCELLED | OUTPATIENT
Start: 2018-12-18

## 2018-12-18 NOTE — TELEPHONE ENCOUNTER
This patient contacted office for the following prescriptions to be filled:    Medication requested :   Requested Prescriptions     Pending Prescriptions Disp Refills    glipiZIDE (GLUCOTROL) 5 mg tablet 90 Tab 1     Sig: Take 1 Tab by mouth daily.  losartan (COZAAR) 25 mg tablet 90 Tab 1     Sig: Take 1 Tab by mouth daily. PCP: Lisette Ojeda or Print: Driss  Mail order or Local pharmacy: [phone]    Scheduled appointment if not seen by current providers in office: LOV 10/03/18, next appt 1/3/19    Patient dropped a container with all her pills in the street, in a parking lot. She lost all of these pills. She called the pharmacy and they told her to call us. I let her know that she should still have a refill on file for her. She states she pays out of pocket so it will not be an issue of insurance not paying for it early, the pharmacy may just need approval from our office for her to fill it again this soon. Pt can be reached at [phone].

## 2018-12-18 NOTE — TELEPHONE ENCOUNTER
Refill request sent to provider for approval or denial. Last refill on 10/03/2018. Last office visit on 10/03/2018. Next appointment on 01/03/2019.

## 2018-12-18 NOTE — TELEPHONE ENCOUNTER
OK to give approval to pharmacy to fill early if needed. Let me know if for some reason I need to resend the rx, but will \"refuse\" it for now.

## 2018-12-20 NOTE — TELEPHONE ENCOUNTER
BERNARDO Mcmullen. Verified name and . Spoke with pharmacist. Notified ok to fill early per provider. Pharmacy ran medication losartan has a cost of $42 and Glipizide $12. Pharmacy had no further questions or concerns. Called home number. Verified name and . Spoke with patient. Notified that prescriptions will be available for pick-up sometime this evening and does come with a cost. Per patient she pays for them anyway. Patient had no further questions or concerns.

## 2018-12-31 ENCOUNTER — TELEPHONE (OUTPATIENT)
Dept: FAMILY MEDICINE CLINIC | Age: 73
End: 2018-12-31

## 2018-12-31 NOTE — TELEPHONE ENCOUNTER
Called home number. Left message on answering machine to return the call. This call was concerning fasting labs due, patient may complete at [facility], appointment reminder completed.

## 2018-12-31 NOTE — TELEPHONE ENCOUNTER
Called home number. Verified name and . Spoke with patient. Notified appointment reminder and fasting labs due. Patient stated will try to complete labs a [facility] on tomorrow. Patient had no further questions or concerns.

## 2019-01-02 ENCOUNTER — HOSPITAL ENCOUNTER (OUTPATIENT)
Dept: LAB | Age: 74
Discharge: HOME | End: 2019-01-02
Payer: MEDICARE

## 2019-01-02 DIAGNOSIS — R80.9 TYPE 2 DIABETES MELLITUS WITH MICROALBUMINURIA, WITHOUT LONG-TERM CURRENT USE OF INSULIN (HCC): ICD-10-CM

## 2019-01-02 DIAGNOSIS — R53.83 FATIGUE, UNSPECIFIED TYPE: ICD-10-CM

## 2019-01-02 DIAGNOSIS — E11.29 TYPE 2 DIABETES MELLITUS WITH MICROALBUMINURIA, WITHOUT LONG-TERM CURRENT USE OF INSULIN (HCC): ICD-10-CM

## 2019-01-02 DIAGNOSIS — M81.0 AGE-RELATED OSTEOPOROSIS WITHOUT CURRENT PATHOLOGICAL FRACTURE: ICD-10-CM

## 2019-01-02 DIAGNOSIS — E55.9 VITAMIN D INSUFFICIENCY: ICD-10-CM

## 2019-01-02 LAB
25(OH)D3 SERPL-MCNC: 27.6 NG/ML (ref 30–100)
ALBUMIN SERPL-MCNC: 3.8 G/DL (ref 3.4–5)
ALBUMIN/GLOB SERPL: 1.1 {RATIO} (ref 0.8–1.7)
ALP SERPL-CCNC: 72 U/L (ref 45–117)
ALT SERPL-CCNC: 13 U/L (ref 13–56)
ANION GAP SERPL CALC-SCNC: 6 MMOL/L (ref 3–18)
AST SERPL-CCNC: 7 U/L (ref 15–37)
BASOPHILS # BLD: 0 K/UL (ref 0–0.1)
BASOPHILS NFR BLD: 0 % (ref 0–2)
BILIRUB SERPL-MCNC: 0.7 MG/DL (ref 0.2–1)
BUN SERPL-MCNC: 26 MG/DL (ref 7–18)
BUN/CREAT SERPL: 26 (ref 12–20)
CALCIUM SERPL-MCNC: 8.9 MG/DL (ref 8.5–10.1)
CHLORIDE SERPL-SCNC: 109 MMOL/L (ref 100–108)
CHOLEST SERPL-MCNC: 225 MG/DL
CO2 SERPL-SCNC: 26 MMOL/L (ref 21–32)
CREAT SERPL-MCNC: 1.01 MG/DL (ref 0.6–1.3)
CREAT UR-MCNC: 220 MG/DL (ref 30–125)
DIFFERENTIAL METHOD BLD: NORMAL
EOSINOPHIL # BLD: 0.2 K/UL (ref 0–0.4)
EOSINOPHIL NFR BLD: 4 % (ref 0–5)
ERYTHROCYTE [DISTWIDTH] IN BLOOD BY AUTOMATED COUNT: 13.4 % (ref 11.6–14.5)
GLOBULIN SER CALC-MCNC: 3.4 G/DL (ref 2–4)
GLUCOSE SERPL-MCNC: 112 MG/DL (ref 74–99)
HBA1C MFR BLD: 5.9 % (ref 4.2–5.6)
HCT VFR BLD AUTO: 37.1 % (ref 35–45)
HDLC SERPL-MCNC: 37 MG/DL (ref 40–60)
HDLC SERPL: 6.1 {RATIO} (ref 0–5)
HGB BLD-MCNC: 12.5 G/DL (ref 12–16)
LDLC SERPL CALC-MCNC: 114.6 MG/DL (ref 0–100)
LIPID PROFILE,FLP: ABNORMAL
LYMPHOCYTES # BLD: 2 K/UL (ref 0.9–3.6)
LYMPHOCYTES NFR BLD: 30 % (ref 21–52)
MCH RBC QN AUTO: 28.9 PG (ref 24–34)
MCHC RBC AUTO-ENTMCNC: 33.7 G/DL (ref 31–37)
MCV RBC AUTO: 85.9 FL (ref 74–97)
MICROALBUMIN UR-MCNC: 5.38 MG/DL (ref 0–3)
MICROALBUMIN/CREAT UR-RTO: 24 MG/G (ref 0–30)
MONOCYTES # BLD: 0.5 K/UL (ref 0.05–1.2)
MONOCYTES NFR BLD: 7 % (ref 3–10)
NEUTS SEG # BLD: 4.1 K/UL (ref 1.8–8)
NEUTS SEG NFR BLD: 59 % (ref 40–73)
PLATELET # BLD AUTO: 239 K/UL (ref 135–420)
PMV BLD AUTO: 10.6 FL (ref 9.2–11.8)
POTASSIUM SERPL-SCNC: 4.7 MMOL/L (ref 3.5–5.5)
PROT SERPL-MCNC: 7.2 G/DL (ref 6.4–8.2)
RBC # BLD AUTO: 4.32 M/UL (ref 4.2–5.3)
SODIUM SERPL-SCNC: 141 MMOL/L (ref 136–145)
TRIGL SERPL-MCNC: 367 MG/DL (ref ?–150)
VLDLC SERPL CALC-MCNC: 73.4 MG/DL
WBC # BLD AUTO: 6.8 K/UL (ref 4.6–13.2)

## 2019-01-02 PROCEDURE — 85025 COMPLETE CBC W/AUTO DIFF WBC: CPT

## 2019-01-02 PROCEDURE — 82043 UR ALBUMIN QUANTITATIVE: CPT

## 2019-01-02 PROCEDURE — 80061 LIPID PANEL: CPT

## 2019-01-02 PROCEDURE — 83036 HEMOGLOBIN GLYCOSYLATED A1C: CPT

## 2019-01-02 PROCEDURE — 82306 VITAMIN D 25 HYDROXY: CPT

## 2019-01-02 PROCEDURE — 36415 COLL VENOUS BLD VENIPUNCTURE: CPT

## 2019-01-02 PROCEDURE — 80053 COMPREHEN METABOLIC PANEL: CPT

## 2019-01-24 ENCOUNTER — OFFICE VISIT (OUTPATIENT)
Dept: FAMILY MEDICINE CLINIC | Age: 74
End: 2019-01-24

## 2019-01-24 VITALS
OXYGEN SATURATION: 96 % | SYSTOLIC BLOOD PRESSURE: 130 MMHG | HEIGHT: 64 IN | HEART RATE: 96 BPM | DIASTOLIC BLOOD PRESSURE: 62 MMHG | TEMPERATURE: 98.6 F | BODY MASS INDEX: 19.84 KG/M2 | WEIGHT: 116.2 LBS | RESPIRATION RATE: 12 BRPM

## 2019-01-24 DIAGNOSIS — Z00.00 INITIAL MEDICARE ANNUAL WELLNESS VISIT: ICD-10-CM

## 2019-01-24 DIAGNOSIS — E11.311 DIABETIC MACULAR EDEMA (HCC): ICD-10-CM

## 2019-01-24 DIAGNOSIS — M81.0 AGE-RELATED OSTEOPOROSIS WITHOUT CURRENT PATHOLOGICAL FRACTURE: ICD-10-CM

## 2019-01-24 DIAGNOSIS — E11.3593 PROLIFERATIVE DIABETIC RETINOPATHY OF BOTH EYES ASSOCIATED WITH TYPE 2 DIABETES MELLITUS, UNSPECIFIED PROLIFERATIVE RETINOPATHY TYPE (HCC): ICD-10-CM

## 2019-01-24 DIAGNOSIS — E11.29 TYPE 2 DIABETES MELLITUS WITH MICROALBUMINURIA, WITHOUT LONG-TERM CURRENT USE OF INSULIN (HCC): Primary | ICD-10-CM

## 2019-01-24 DIAGNOSIS — E55.9 VITAMIN D DEFICIENCY: ICD-10-CM

## 2019-01-24 DIAGNOSIS — R80.9 TYPE 2 DIABETES MELLITUS WITH MICROALBUMINURIA, WITHOUT LONG-TERM CURRENT USE OF INSULIN (HCC): Primary | ICD-10-CM

## 2019-01-24 RX ORDER — ACETAMINOPHEN 500 MG
2000 TABLET ORAL DAILY
Qty: 30 CAP | Refills: 5 | Status: SHIPPED | OUTPATIENT
Start: 2019-01-24

## 2019-01-24 RX ORDER — SIMVASTATIN 20 MG/1
20 TABLET, FILM COATED ORAL
Qty: 30 TAB | Refills: 2 | Status: SHIPPED | OUTPATIENT
Start: 2019-01-24 | End: 2019-06-03 | Stop reason: SDUPTHER

## 2019-01-24 NOTE — PROGRESS NOTES
Chief Complaint Patient presents with [facility] Annual Wellness Visit  Diabetes  Hypertension  Results  Osteoporosis Patient is not fasting. Patient in room # 5.  
 
 
1. Have you been to the ER, urgent care clinic since your last visit? Hospitalized since your last visit? No 
 
2. Have you seen or consulted any other health care providers outside of the [address] [place] [address] since your last visit? Include any pap smears or colon screening. Yes When: 01/11/2019 Where: Dr. Rodrigo Adam Reason for visit: Eye Exam and Injections HM Reviewed. Upcoming appt. Dr Rodrigo Adam 02/15/2019 Flowsheet, Learning needs, Abuse, ADL, Fall, PHQ and 6cit completed. PHQ over the last two weeks 1/24/2019 Little interest or pleasure in doing things Not at all Feeling down, depressed, irritable, or hopeless Not at all Total Score PHQ 2 0

## 2019-01-24 NOTE — PROGRESS NOTES
Patient: Vivian Mo MRN: [id number]  SSN: [id number] YOB: 1945  Age: 75 y.o. Sex: female Date of Service: 1/24/2019 Provider: JOHNNY Rae Office Location:  
[place] [address], [address] [place], [address] Office Phone: [phone] Office Fax: [phone] REASON FOR VISIT:  
Chief Complaint Patient presents with [facility] Annual Wellness Visit  Diabetes  Hypertension  Results  Osteoporosis VITALS:  
Visit Vitals /62 (BP 1 Location: Left arm, BP Patient Position: Sitting) Comment: manual  
Pulse 96 Temp 98.6 °F (37 °C) (Oral) Resp 12 Ht 5' 4\" (1.626 m) Wt 116 lb 3.2 oz (52.7 kg) SpO2 96% BMI 19.95 kg/m² MEDICATIONS:  
Current Outpatient Medications on File Prior to Visit Medication Sig Dispense Refill  metFORMIN (GLUCOPHAGE) 1,000 mg tablet Take 1 Tab by mouth two (2) times daily (with meals). 180 Tab 0  
 losartan (COZAAR) 25 mg tablet Take 1 Tab by mouth daily. 90 Tab 1  
 glipiZIDE (GLUCOTROL) 5 mg tablet Take 1 Tab by mouth daily. 90 Tab 1  VIT C/E/ZN/COPPR/LUTEIN/ZEAXAN (PRESERVISION AREDS 2 PO) Take  by mouth.  COQ10, UBIQUINOL, PO Take  by mouth.  Calcium-Cholecalciferol, D3, (CALCIUM 500 + D) 500 mg(1,250mg) -400 unit tab Take 1 Tab by mouth two (2) times a day. 180 Tab 4  
 denosumab (PROLIA) 60 mg/mL injection 1 mL by SubCUTAneous route every 6 months. To be done at [facility]. 1 Syringe 1 No current facility-administered medications on file prior to visit. ALLERGIES:  
Allergies Allergen Reactions  Aspralum [Aspirin, Buffered] Other (comments) Causes abdominal cramping MEDICAL/SURGICAL HISTORY: 
Past Medical History:  
Diagnosis Date  Cataract 04/2017  
 bilateral   
 Diabetes ([address]  Diabetic retinopathy of both eyes ([address] 04/2017  Hypertension  Macular degeneration 9/2015  Osteoporosis 09/2015 on Prolia starting 2/2017 Past Surgical History:  
Procedure Laterality Date  COLONOSCOPY N/A 7/19/2017 COLONOSCOPY performed by Pratibha Pierre MD at [address]  12/2016  
 right eye FAMILY HISTORY: 
Family History Problem Relation Age of Onset  Heart Disease Mother  Heart Disease Father  Other Brother   
     brain aneurysm SOCIAL HISTORY: 
Social History Tobacco Use  Smoking status: Never Smoker  Smokeless tobacco: Never Used Substance Use Topics  Alcohol use: No  
 Drug use: No  
   
 
 
 
HISTORY OF PRESENT ILLNESS: Nellie Malloy is a 75 y.o. female who presents to the office for a routine follow up visit. Diabetes - 
Currently the patient's condition is stable, well controlled. Last A1c: 5.9% on 1/2/19 Patient reports compliance with the following: metformin 1,000 mg BID, glipizide 5 mg daily 
  
Last urine microalbumin: 1/2/19, mild microalbuminuria noted. Patient is on ARB therapy. Last lipid panel: 1/2/19, .6 at that time. Patient is NOT on statin therapy Last foot exam: 2/2/17 Last eye exam: 4/5/18. Follows closely with Dr. Maritza Hooper for DM retinopathy with macular edema 
  
Hypertension -  
Currently, the patient's condition is poorly controlled. She is prescribed losartan 25 mg daily, but has been out of her meds x about 2 mo. Home BP readings: not checking  
  
Osteoporosis -  
Diagnosed on screening DEXA 9/2015. Patient was originally treated with Fosamax, but was unable to tolerate, and has since been switched to Prolia infusions q 6 mo. She also takes calcium and vitamin D. Her Repeat DEXA 11/2017 showed a 5.3% in crease in the lumbar BMD, and less significant increases in the distal radius and b/l proximal femurs. Her recent vitamin D level was found to be low at 27.6.  She has been off her Prolia for nearly a year now. States she was talking to some women she knows who experienced fractures while on Prolia, and she learned of a class action lawsuit that gave her some pause. REVIEW OF SYSTEMS: 
Review of Systems Constitutional: Negative for chills, fever and malaise/fatigue. Respiratory: Negative for cough, shortness of breath and wheezing. Cardiovascular: Negative for chest pain, palpitations and leg swelling. Gastrointestinal: Negative for abdominal pain, constipation, diarrhea, nausea and vomiting. PHYSICAL EXAMINATION: 
Physical Exam  
Constitutional: She is oriented to person, place, and time and well-developed, well-nourished, and in no distress. Cardiovascular: Normal rate, regular rhythm and normal heart sounds. Exam reveals no gallop and no friction rub. No murmur heard. Pulmonary/Chest: Effort normal and breath sounds normal. She has no wheezes. She has no rales. Neurological: She is alert and oriented to person, place, and time. Gait normal.  
Skin: Skin is warm and dry. No rash noted. Psychiatric: Mood, memory and affect normal.  
  
 
RESULTS: 
Lab Results Component Value Date/Time WBC 6.8 01/02/2019 10:48 AM  
 Hemoglobin, POC 11.9 (L) 03/02/2018 10:38 AM  
 HGB 12.5 01/02/2019 10:48 AM  
 Hematocrit, POC 35 (L) 03/02/2018 10:38 AM  
 HCT 37.1 01/02/2019 10:48 AM  
 PLATELET 003 54/30/6885 10:48 AM  
 MCV 85.9 01/02/2019 10:48 AM  
  
Lab Results Component Value Date/Time  Sodium 141 01/02/2019 10:48 AM  
 Potassium 4.7 01/02/2019 10:48 AM  
 Chloride 109 (H) 01/02/2019 10:48 AM  
 CO2 26 01/02/2019 10:48 AM  
 Anion gap 6 01/02/2019 10:48 AM  
 Glucose 112 (H) 01/02/2019 10:48 AM  
 BUN 26 (H) 01/02/2019 10:48 AM  
 Creatinine 1.01 01/02/2019 10:48 AM  
 BUN/Creatinine ratio 26 (H) 01/02/2019 10:48 AM  
 GFR est AA >60 01/02/2019 10:48 AM  
 GFR est non-AA 54 (L) 01/02/2019 10:48 AM  
 Calcium 8.9 01/02/2019 10:48 AM  
 Bilirubin, total 0.7 01/02/2019 10:48 AM  
 AST (SGOT) 7 (L) 01/02/2019 10:48 AM  
 Alk. phosphatase 72 01/02/2019 10:48 AM  
 Protein, total 7.2 01/02/2019 10:48 AM  
 Albumin 3.8 01/02/2019 10:48 AM  
 Globulin 3.4 01/02/2019 10:48 AM  
 A-G Ratio 1.1 01/02/2019 10:48 AM  
 ALT (SGPT) 13 01/02/2019 10:48 AM  
  
Lab Results Component Value Date/Time Cholesterol, total 225 (H) 01/02/2019 10:48 AM  
 HDL Cholesterol 37 (L) 01/02/2019 10:48 AM  
 LDL, calculated 114.6 (H) 01/02/2019 10:48 AM  
 VLDL, calculated 73.4 01/02/2019 10:48 AM  
 Triglyceride 367 (H) 01/02/2019 10:48 AM  
 CHOL/HDL Ratio 6.1 (H) 01/02/2019 10:48 AM  
  
Lab Results Component Value Date/Time Hemoglobin A1c 5.9 (H) 01/02/2019 10:48 AM  
 Hemoglobin A1c (POC) 5.5 12/13/2017 12:45 PM  
  
Lab Results Component Value Date/Time Microalbumin/Creat ratio (mg/g creat) 24 01/02/2019 10:48 AM  
 Microalbumin,urine random 5.38 (H) 01/02/2019 10:48 AM  
 Microalbumin urine (POC) 80 09/09/2015 10:43 AM  
 
Lab Results Component Value Date/Time Vitamin D 25-Hydroxy 27.6 (L) 01/02/2019 10:48 AM  
    
 
ASSESSMENT/PLAN: 
Diagnoses and all orders for this visit: 
 
1. Type 2 diabetes mellitus with microalbuminuria, without long-term current use of insulin (HCC) - Well controlled, continue current therapy - Will be starting her on low-medium dose statin given recent increase in LDL. Explained that this is to reduce her cardiovascular risk 
- repeat labs in 3 mo Orders:   
-     simvastatin (ZOCOR) 20 mg tablet; Take 1 Tab by mouth nightly. -     METABOLIC PANEL, COMPREHENSIVE; Future -     LIPID PANEL; Future -     VITAMIN D, 25 HYDROXY; Future 2. Proliferative diabetic retinopathy of both eyes associated with type 2 diabetes mellitus, unspecified proliferative retinopathy type ([address] 3. Diabetic macular edema ([address] - Continue to work on good glycemic control - Follow up with ophthalmology as scheduled 4. Age-related osteoporosis without current pathological fracture 5. Vitamin D deficiency - We had a lengthy discussion today regarding all of the available options for osteoporosis treatment, risks and benefits of each. - Ultimately, patient elected to continue the Prolia and will contact the infusion center today to get back on track with her regular q6 mo infusions Orders:   
-     cholecalciferol (VITAMIN D3) 2,000 unit cap capsule; Take 2,000 Units by mouth daily. -     VITAMIN D, 25 HYDROXY; Future Follow-up Disposition: 
Return in about 3 months (around 4/24/2019) for fasting labs prior. All questions answered. Patient expresses understanding and agrees with the plan as detailed above. PATIENT CARE TEAM:  
Patient Care Team: JOHNNY Cooper as PCP - General (Physician Assistant) Karen Herrera MD (Ophthalmology) JOHNNY Garcia  
January 24, 2019  
12:05 PM

## 2019-01-24 NOTE — PATIENT INSTRUCTIONS
Medicare Wellness Visit, Female The best way to live healthy is to have a lifestyle where you eat a well-balanced diet, exercise regularly, limit alcohol use, and quit all forms of tobacco/nicotine, if applicable. Regular preventive services are another way to keep healthy. Preventive services (vaccines, screening tests, monitoring & exams) can help personalize your care plan, which helps you manage your own care. Screening tests can find health problems at the earliest stages, when they are easiest to treat. Donn Balbuena follows the current, evidence-based guidelines published by the [facility] ([facility]) when recommending preventive services for our patients. Because we follow these guidelines, sometimes recommendations change over time as research supports it. (For example, mammograms used to be recommended annually. Even though Medicare will still pay for an annual mammogram, the newer guidelines recommend a mammogram every two years for women of average risk.) Of course, you and your doctor may decide to screen more often for some diseases, based on your risk and your health status. Preventive services for you include: - Medicare offers their members a free annual wellness visit, which is time for you and your primary care provider to discuss and plan for your preventive service needs. Take advantage of this benefit every year! 
-All adults over the age of 72 should receive the recommended pneumonia vaccines. Current USPSTF guidelines recommend a series of two vaccines for the best pneumonia protection.  
-All adults should have a flu vaccine yearly and a tetanus vaccine every 10 years. All adults age 61 and older should receive a shingles vaccine once in their lifetime.   
-A bone mass density test is recommended when a woman turns 65 to screen for osteoporosis. This test is only recommended one time, as a screening. Some providers will use this same test as a disease monitoring tool if you already have osteoporosis. -All adults age 38-68 who are overweight should have a diabetes screening test once every three years.  
-Other screening tests and preventive services for persons with diabetes include: an eye exam to screen for diabetic retinopathy, a kidney function test, a foot exam, and stricter control over your cholesterol.  
-Cardiovascular screening for adults with routine risk involves an electrocardiogram (ECG) at intervals determined by your doctor.  
-Colorectal cancer screenings should be done for adults age 54-65 with no increased risk factors for colorectal cancer. There are a number of acceptable methods of screening for this type of cancer. Each test has its own benefits and drawbacks. Discuss with your doctor what is most appropriate for you during your annual wellness visit. The different tests include: colonoscopy (considered the best screening method), a fecal occult blood test, a fecal DNA test, and sigmoidoscopy. -Breast cancer screenings are recommended every other year for women of normal risk, age 54-69. 
-Cervical cancer screenings for women over age 72 are only recommended with certain risk factors.  
-All adults born between [facility] should be screened once for Hepatitis C. Here is a list of your current Health Maintenance items (your personalized list of preventive services) with a due date: 
Health Maintenance Due Topic Date Due  
 DTaP/Tdap/Td  (1 - Tdap) 02/15/1965  Shingles Vaccine (1 of 2) 02/15/1994  Diabetic Foot Care  02/02/2018 [facility] Annual Well Visit  12/14/2018 A Healthy Lifestyle: Care Instructions Your Care Instructions A healthy lifestyle can help you feel good, stay at a healthy weight, and have plenty of energy for both work and play. A healthy lifestyle is something you can share with your whole family. A healthy lifestyle also can lower your risk for serious health problems, such as high blood pressure, heart disease, and diabetes. You can follow a few steps listed below to improve your health and the health of your family. Follow-up care is a key part of your treatment and safety. Be sure to make and go to all appointments, and call your doctor if you are having problems. It's also a good idea to know your test results and keep a list of the medicines you take. How can you care for yourself at home? · Do not eat too much sugar, fat, or fast foods. You can still have dessert and treats now and then. The goal is moderation. · Start small to improve your eating habits. Pay attention to portion sizes, drink less juice and soda pop, and eat more fruits and vegetables. ? Eat a healthy amount of food. A 3-ounce serving of meat, for example, is about the size of a deck of cards. Fill the rest of your plate with vegetables and whole grains. ? Limit the amount of soda and sports drinks you have every day. Drink more water when you are thirsty. ? Eat at least 5 servings of fruits and vegetables every day. It may seem like a lot, but it is not hard to reach this goal. A serving or helping is 1 piece of fruit, 1 cup of vegetables, or 2 cups of leafy, raw vegetables. Have an apple or some carrot sticks as an afternoon snack instead of a candy bar. Try to have fruits and/or vegetables at every meal. 
· Make exercise part of your daily routine. You may want to start with simple activities, such as walking, bicycling, or slow swimming. Try to be active 30 to 60 minutes every day. You do not need to do all 30 to 60 minutes all at once. For example, you can exercise 3 times a day for 10 or 20 minutes. Moderate exercise is safe for most people, but it is always a good idea to talk to your doctor before starting an exercise program. 
· Keep moving. Roni Contrerasome the lawn, work in the garden, or [employer].  Take the stairs instead of the elevator at work. · If you smoke, quit. People who smoke have an increased risk for heart attack, stroke, cancer, and other lung illnesses. Quitting is hard, but there are ways to boost your chance of quitting tobacco for good. ? Use nicotine gum, patches, or lozenges. ? Ask your doctor about stop-smoking programs and medicines. ? Keep trying. In addition to reducing your risk of diseases in the future, you will notice some benefits soon after you stop using tobacco. If you have shortness of breath or asthma symptoms, they will likely get better within a few weeks after you quit. · Limit how much alcohol you drink. Moderate amounts of alcohol (up to 2 drinks a day for men, 1 drink a day for women) are okay. But drinking too much can lead to liver problems, high blood pressure, and other health problems. Family health If you have a family, there are many things you can do together to improve your health. · Eat meals together as a family as often as possible. · Eat healthy foods. This includes fruits, vegetables, lean meats and dairy, and whole grains. · Include your family in your fitness plan. Most people think of activities such as jogging or tennis as the way to fitness, but there are many ways you and your family can be more active. Anything that makes you breathe hard and gets your heart pumping is exercise. Here are some tips: 
? Walk to do errands or to take your child to school or the bus. 
? Go for a family bike ride after dinner instead of watching TV. Where can you learn more? Go to http://ruiz-tino.info/. Enter A570 in the search box to learn more about \"A Healthy Lifestyle: Care Instructions. \" Current as of: September 11, 2018 Content Version: 11.9 © 9831-5598 [employer].  Care instructions adapted under license by [employer] (which disclaims liability or warranty for this information). If you have questions about a medical condition or this instruction, always ask your healthcare professional. [place] [address] any warranty or liability for your use of this information.

## 2019-01-24 NOTE — PROGRESS NOTES
This is an Initial Medicare Annual Wellness Exam (AWV) (Performed 12 months after IPPE or effective date of Medicare Part B enrollment, Once in a lifetime) I have reviewed the patient's medical history in detail and updated the computerized patient record. History Past Medical History:  
Diagnosis Date  Cataract 04/2017  
 bilateral   
 Diabetes ([address]  Diabetic retinopathy of both eyes ([address] 04/2017  Hypertension  Macular degeneration 9/2015  Osteoporosis 09/2015 on Prolia starting 2/2017 Past Surgical History:  
Procedure Laterality Date  COLONOSCOPY N/A 7/19/2017 COLONOSCOPY performed by Olga Harp MD at [address] [place] [address]  12/2016  
 right eye Current Outpatient Medications Medication Sig Dispense Refill  metFORMIN (GLUCOPHAGE) 1,000 mg tablet Take 1 Tab by mouth two (2) times daily (with meals). 180 Tab 0  
 losartan (COZAAR) 25 mg tablet Take 1 Tab by mouth daily. 90 Tab 1  
 glipiZIDE (GLUCOTROL) 5 mg tablet Take 1 Tab by mouth daily. 90 Tab 1  VIT C/E/ZN/COPPR/LUTEIN/ZEAXAN (PRESERVISION AREDS 2 PO) Take  by mouth.  COQ10, UBIQUINOL, PO Take  by mouth.  Calcium-Cholecalciferol, D3, (CALCIUM 500 + D) 500 mg(1,250mg) -400 unit tab Take 1 Tab by mouth two (2) times a day. 180 Tab 4  
 denosumab (PROLIA) 60 mg/mL injection 1 mL by SubCUTAneous route every 6 months. To be done at [facility]. 1 Syringe 1 Allergies Allergen Reactions  Aspralum [Aspirin, Buffered] Other (comments) Causes abdominal cramping Family History Problem Relation Age of Onset  Heart Disease Mother  Heart Disease Father  Other Brother   
     brain aneurysm Social History Tobacco Use  Smoking status: Never Smoker  Smokeless tobacco: Never Used Substance Use Topics  Alcohol use: No  
 
Patient Active Problem List  
Diagnosis Code  Essential hypertension I10  
  Osteoporosis M81.0  Type II diabetes mellitus ([facility] E11.9  Macular degeneration H35.30  Diabetic retinopathy of both eyes ([facility] E11.319  
 Diabetic macular edema ([facility] E11.311 Depression Risk Factor Screening: PHQ over the last two weeks 1/24/2019 Little interest or pleasure in doing things Not at all Feeling down, depressed, irritable, or hopeless Not at all Total Score PHQ 2 0 Alcohol Risk Factor Screening: You do not drink alcohol or very rarely. Functional Ability and Level of Safety:  
 
Hearing Loss Hearing is good. Activities of Daily Living The home contains: handrails and grab bars Patient does total self care Fall Risk Fall Risk Assessment, last 12 mths 1/24/2019 Able to walk? Yes Fall in past 12 months? No  
Fall with injury? -  
Number of falls in past 12 months - Fall Risk Score -  
 
 
Abuse Screen Patient is not abused  Reports feeling safe at home. Abuse Screening Questionnaire 1/24/2019 Do you ever feel afraid of your partner? Melissa Aid Are you in a relationship with someone who physically or mentally threatens you? Melissa Aid Is it safe for you to go home? Nellie Reyna Cognitive Screening Evaluation of Cognitive Function: 
Has your family/caregiver stated any concerns about your memory: no 
Normal 
 
Patient Care Team  
Patient Care Team: JOHNNY Clay as PCP - General (Physician Assistant) Олег Le MD (Ophthalmology) Assessment/Plan Education and counseling provided: 
Are appropriate based on today's review and evaluation Screening Mammography Diagnoses and all orders for this visit: 
 
1. Initial Medicare annual wellness visit - Medicare Wellness visit completed 
- Health screenings reviewed. Patient prefers mammo q 2 years as she is low risk - Age and gender specific counseling provided Health Maintenance Due Topic Date Due  
 DTaP/Tdap/Td series (1 - Tdap) 02/15/1965  Shingrix Vaccine Age 50> (1 of 2) 02/15/1994  
 FOOT EXAM Q1  02/02/2018 JOHNNY Winkler  
1/24/2019  
12:06 PM

## 2019-01-24 NOTE — ACP (ADVANCE CARE PLANNING)
Advance Care Planning (ACP) Provider Conversation Snapshot    Date of ACP Conversation: 01/24/19  Persons included in Conversation:  patient  Length of ACP Conversation in minutes:  <16 minutes (Non-Billable)    Patient has been referred to our nurse navigator for the [employer]. They were provided with a folder with some reading materials for review. Our nurse navigator will reach out to the patient to schedule an initial discussion.

## 2019-03-08 ENCOUNTER — APPOINTMENT (OUTPATIENT)
Dept: INFUSION THERAPY | Age: 74
End: 2019-03-08

## 2019-03-14 ENCOUNTER — APPOINTMENT (OUTPATIENT)
Dept: INFUSION THERAPY | Age: 74
End: 2019-03-14

## 2019-06-03 DIAGNOSIS — E11.29 TYPE 2 DIABETES MELLITUS WITH MICROALBUMINURIA, WITHOUT LONG-TERM CURRENT USE OF INSULIN (HCC): ICD-10-CM

## 2019-06-03 DIAGNOSIS — R80.9 TYPE 2 DIABETES MELLITUS WITH MICROALBUMINURIA, WITHOUT LONG-TERM CURRENT USE OF INSULIN (HCC): ICD-10-CM

## 2019-06-03 RX ORDER — SIMVASTATIN 20 MG/1
TABLET, FILM COATED ORAL
Qty: 90 TAB | Refills: 0 | Status: SHIPPED | OUTPATIENT
Start: 2019-06-03 | End: 2019-08-01 | Stop reason: SDUPTHER

## 2019-06-07 DIAGNOSIS — E11.29 TYPE 2 DIABETES MELLITUS WITH MICROALBUMINURIA, WITHOUT LONG-TERM CURRENT USE OF INSULIN (HCC): ICD-10-CM

## 2019-06-07 DIAGNOSIS — I10 ESSENTIAL HYPERTENSION: ICD-10-CM

## 2019-06-07 DIAGNOSIS — R80.9 TYPE 2 DIABETES MELLITUS WITH MICROALBUMINURIA, WITHOUT LONG-TERM CURRENT USE OF INSULIN (HCC): ICD-10-CM

## 2019-06-09 NOTE — TELEPHONE ENCOUNTER
Patient is due for follow up, with fasting labs to be done prior. Please have her schedule this and I will send enough refills to last until her appointment, if needed.

## 2019-06-10 NOTE — TELEPHONE ENCOUNTER
Spoke with Mrs. Bailey Lechuga, who has been scheduled for June 24,2019 and  Will have labs done on Saturday June 25,2019

## 2019-06-11 RX ORDER — METFORMIN HYDROCHLORIDE 1000 MG/1
TABLET ORAL
Qty: 90 TAB | Refills: 0 | Status: SHIPPED | OUTPATIENT
Start: 2019-06-11 | End: 2019-10-10 | Stop reason: SDUPTHER

## 2019-06-11 RX ORDER — GLIPIZIDE 5 MG/1
TABLET ORAL
Qty: 90 TAB | Refills: 0 | Status: SHIPPED | OUTPATIENT
Start: 2019-06-11 | End: 2019-08-01 | Stop reason: SDUPTHER

## 2019-06-11 RX ORDER — LOSARTAN POTASSIUM 25 MG/1
TABLET ORAL
Qty: 90 TAB | Refills: 0 | Status: SHIPPED | OUTPATIENT
Start: 2019-06-11 | End: 2019-07-31 | Stop reason: SDUPTHER

## 2019-07-31 DIAGNOSIS — I10 ESSENTIAL HYPERTENSION: ICD-10-CM

## 2019-07-31 DIAGNOSIS — E11.29 TYPE 2 DIABETES MELLITUS WITH MICROALBUMINURIA, WITHOUT LONG-TERM CURRENT USE OF INSULIN (HCC): ICD-10-CM

## 2019-07-31 DIAGNOSIS — R80.9 TYPE 2 DIABETES MELLITUS WITH MICROALBUMINURIA, WITHOUT LONG-TERM CURRENT USE OF INSULIN (HCC): ICD-10-CM

## 2019-07-31 RX ORDER — LOSARTAN POTASSIUM 25 MG/1
TABLET ORAL
Qty: 90 TAB | Refills: 0 | Status: SHIPPED | OUTPATIENT
Start: 2019-07-31 | End: 2019-10-10 | Stop reason: SDUPTHER

## 2019-07-31 RX ORDER — LANCETS 33 GAUGE
EACH MISCELLANEOUS
Qty: 100 LANCET | Refills: 0 | Status: SHIPPED | OUTPATIENT
Start: 2019-07-31 | End: 2019-10-10 | Stop reason: SDUPTHER

## 2019-07-31 RX ORDER — INSULIN PUMP SYRINGE, 3 ML
EACH MISCELLANEOUS
Qty: 1 KIT | Refills: 0 | Status: SHIPPED | OUTPATIENT
Start: 2019-07-31 | End: 2020-11-16 | Stop reason: SDUPTHER

## 2019-07-31 NOTE — TELEPHONE ENCOUNTER
This pharmacy faxed over request for the following prescriptions to be filled:    Medication requested :   Requested Prescriptions     Pending Prescriptions Disp Refills    Blood-Glucose Meter (TRUE METRIX AIR GLUCOSE METER) monitoring kit 1 Kit 0     Sig: Dx: E11.9    glucose blood VI test strips (BLOOD GLUCOSE TEST) strip 100 Strip      Sig: True Metrix Air testing strips Dx:E11.9    lancets (TRUEPLUS LANCETS) 33 gauge misc      losartan (COZAAR) 25 mg tablet 90 Tab 0        TRUEPLUS 33G LANCETS   TRUE METRIX AIR METER   TRUE METRIX TEST STRIPS  PCP: [address] [place] [address] or Print: [employer]   Mail order or [place]     Scheduled appointment if not seen by current providers in office: LOV 1/24/2019 F/U 8/5/2019 HER APPT IS IN [place] BUT NOT SHOWING UP IN [facility]. NOT SURE WHY.

## 2019-08-01 DIAGNOSIS — E11.29 TYPE 2 DIABETES MELLITUS WITH MICROALBUMINURIA, WITHOUT LONG-TERM CURRENT USE OF INSULIN (HCC): ICD-10-CM

## 2019-08-01 DIAGNOSIS — R80.9 TYPE 2 DIABETES MELLITUS WITH MICROALBUMINURIA, WITHOUT LONG-TERM CURRENT USE OF INSULIN (HCC): ICD-10-CM

## 2019-08-01 RX ORDER — METFORMIN HYDROCHLORIDE 1000 MG/1
1000 TABLET ORAL 2 TIMES DAILY WITH MEALS
Qty: 180 TAB | Refills: 0 | Status: SHIPPED | OUTPATIENT
Start: 2019-08-01 | End: 2019-10-10 | Stop reason: SDUPTHER

## 2019-08-01 RX ORDER — GLIPIZIDE 5 MG/1
TABLET ORAL
Qty: 90 TAB | Refills: 0 | Status: SHIPPED | OUTPATIENT
Start: 2019-08-01 | End: 2019-10-10 | Stop reason: SDUPTHER

## 2019-08-01 RX ORDER — SIMVASTATIN 20 MG/1
TABLET, FILM COATED ORAL
Qty: 90 TAB | Refills: 0 | Status: SHIPPED | OUTPATIENT
Start: 2019-08-01 | End: 2019-09-01 | Stop reason: SDUPTHER

## 2019-08-01 NOTE — TELEPHONE ENCOUNTER
This pharmacy faxed over request for the following prescriptions to be filled:    Medication requested :   Requested Prescriptions     Pending Prescriptions Disp Refills    metFORMIN (GLUCOPHAGE) 1,000 mg tablet 180 Tab 0     Sig: Take 1 Tab by mouth two (2) times daily (with meals).     simvastatin (ZOCOR) 20 mg tablet 90 Tab 0    glipiZIDE (GLUCOTROL) 5 mg tablet 90 Tab 0     PCP: [place] or Print: [employer]   Mail order or Local pharmacy Mail Order     Scheduled appointment if not seen by current providers in office: Ronnei Hawk 1/24/2019 f/u 8/5/2019

## 2019-08-02 ENCOUNTER — HOSPITAL ENCOUNTER (OUTPATIENT)
Dept: LAB | Age: 74
Discharge: HOME | End: 2019-08-02

## 2019-08-02 LAB — XX-LABCORP SPECIMEN COL,LCBCF: NORMAL

## 2019-08-02 PROCEDURE — 99001 SPECIMEN HANDLING PT-LAB: CPT

## 2019-08-03 LAB
25(OH)D3+25(OH)D2 SERPL-MCNC: 56.2 NG/ML (ref 30–100)
ALBUMIN SERPL-MCNC: 4.5 G/DL (ref 3.5–4.8)
ALBUMIN/GLOB SERPL: 2 {RATIO} (ref 1.2–2.2)
ALP SERPL-CCNC: 67 IU/L (ref 39–117)
ALT SERPL-CCNC: 11 IU/L (ref 0–32)
AST SERPL-CCNC: 11 IU/L (ref 0–40)
BILIRUB SERPL-MCNC: 0.4 MG/DL (ref 0–1.2)
BUN SERPL-MCNC: 18 MG/DL (ref 8–27)
BUN/CREAT SERPL: 18 (ref 12–28)
CALCIUM SERPL-MCNC: 10.2 MG/DL (ref 8.7–10.3)
CHLORIDE SERPL-SCNC: 102 MMOL/L (ref 96–106)
CHOLEST SERPL-MCNC: 183 MG/DL (ref 100–199)
CO2 SERPL-SCNC: 26 MMOL/L (ref 20–29)
CREAT SERPL-MCNC: 0.98 MG/DL (ref 0.57–1)
GLOBULIN SER CALC-MCNC: 2.3 G/DL (ref 1.5–4.5)
GLUCOSE SERPL-MCNC: 91 MG/DL (ref 65–99)
HDLC SERPL-MCNC: 37 MG/DL
INTERPRETATION, 910389: NORMAL
INTERPRETATION: NORMAL
LDLC SERPL CALC-MCNC: 77 MG/DL (ref 0–99)
PDF IMAGE, 910387: NORMAL
POTASSIUM SERPL-SCNC: 4.2 MMOL/L (ref 3.5–5.2)
PROT SERPL-MCNC: 6.8 G/DL (ref 6–8.5)
SODIUM SERPL-SCNC: 142 MMOL/L (ref 134–144)
TRIGL SERPL-MCNC: 344 MG/DL (ref 0–149)
VLDLC SERPL CALC-MCNC: 69 MG/DL (ref 5–40)

## 2019-08-05 ENCOUNTER — OFFICE VISIT (OUTPATIENT)
Dept: FAMILY MEDICINE CLINIC | Age: 74
End: 2019-08-05

## 2019-08-05 VITALS
HEART RATE: 93 BPM | TEMPERATURE: 99.6 F | HEIGHT: 64 IN | RESPIRATION RATE: 14 BRPM | DIASTOLIC BLOOD PRESSURE: 60 MMHG | BODY MASS INDEX: 21.27 KG/M2 | OXYGEN SATURATION: 96 % | SYSTOLIC BLOOD PRESSURE: 114 MMHG | WEIGHT: 124.6 LBS

## 2019-08-05 DIAGNOSIS — E11.3593 PROLIFERATIVE DIABETIC RETINOPATHY OF BOTH EYES ASSOCIATED WITH TYPE 2 DIABETES MELLITUS, UNSPECIFIED PROLIFERATIVE RETINOPATHY TYPE (HCC): ICD-10-CM

## 2019-08-05 DIAGNOSIS — E11.311 DIABETIC MACULAR EDEMA (HCC): ICD-10-CM

## 2019-08-05 DIAGNOSIS — R80.9 TYPE 2 DIABETES MELLITUS WITH MICROALBUMINURIA, WITHOUT LONG-TERM CURRENT USE OF INSULIN (HCC): Primary | ICD-10-CM

## 2019-08-05 DIAGNOSIS — M81.0 OSTEOPOROSIS, UNSPECIFIED OSTEOPOROSIS TYPE, UNSPECIFIED PATHOLOGICAL FRACTURE PRESENCE: ICD-10-CM

## 2019-08-05 DIAGNOSIS — E11.29 TYPE 2 DIABETES MELLITUS WITH MICROALBUMINURIA, WITHOUT LONG-TERM CURRENT USE OF INSULIN (HCC): Primary | ICD-10-CM

## 2019-08-05 DIAGNOSIS — I10 ESSENTIAL HYPERTENSION: ICD-10-CM

## 2019-08-05 LAB — HBA1C MFR BLD HPLC: 6 %

## 2019-08-05 NOTE — PROGRESS NOTES
Chief Complaint   Patient presents with    Diabetes     6 month follow up with lab review. Health Maintenance Due   Topic Date Due    DTaP/Tdap/Td series (1 - Tdap) 02/15/1965    Shingrix Vaccine Age 50> (1 of 2) 02/15/1994    FOOT EXAM Q1  02/02/2018    EYE EXAM RETINAL OR DILATED  04/05/2018    HEMOGLOBIN A1C Q6M  07/02/2019    Influenza Age 9 to Adult  08/01/2019     1. Have you been to the ER, urgent care clinic or hospitalized since your last visit? No    2. Have you seen or consulted any other health care providers outside of the [address] [place] [address] since your last visit (Include any pap smears or colon screening)? NO      Learning Assessment 1/24/2019   PRIMARY LEARNER Patient   HIGHEST LEVEL OF EDUCATION - PRIMARY LEARNER  -   BARRIERS PRIMARY LEARNER NONE   CO-LEARNER CAREGIVER No   PRIMARY LANGUAGE ENGLISH   LEARNER PREFERENCE PRIMARY READING     -     -     -   ANSWERED BY patient   RELATIONSHIP SELF     3 most recent PHQ Screens 8/5/2019   Little interest or pleasure in doing things Not at all   Feeling down, depressed, irritable, or hopeless Not at all   Total Score PHQ 2 0     Abuse Screening Questionnaire 8/5/2019   Do you ever feel afraid of your partner? N   Are you in a relationship with someone who physically or mentally threatens you? N   Is it safe for you to go home? Y     Fall Risk Assessment, last 12 mths 8/5/2019   Able to walk? Yes   Fall in past 12 months?  No   Fall with injury? -   Number of falls in past 12 months -   Fall Risk Score -

## 2019-08-05 NOTE — PROGRESS NOTES
Patient: Justina Cueto MRN: [id number]  SSN: [id number]    YOB: 1945  Age: 75 y.o. Sex: female      Date of Service: 8/5/2019   Provider: JOHNNY Robles   Office Location:   [address] [place], [address]  Office Phone: [phone]  Office Fax: [phone]      REASON FOR VISIT:   Chief Complaint   Patient presents with    Diabetes     6 month follow up with lab review. VITALS:   Visit Vitals  /60 (BP 1 Location: Left arm, BP Patient Position: Sitting)   Pulse 93   Temp 99.6 °F (37.6 °C) (Oral)   Resp 14   Ht 5' 4\" (1.626 m)   Wt 124 lb 9.6 oz (56.5 kg)   SpO2 96%   BMI 21.39 kg/m²        MEDICATIONS:   Current Outpatient Medications on File Prior to Visit   Medication Sig Dispense Refill    metFORMIN (GLUCOPHAGE) 1,000 mg tablet Take 1 Tab by mouth two (2) times daily (with meals). 180 Tab 0    simvastatin (ZOCOR) 20 mg tablet TAKE ONE TABLET BY MOUTH NIGHTLY 90 Tab 0    glipiZIDE (GLUCOTROL) 5 mg tablet TAKE ONE TABLET BY MOUTH DAILY 90 Tab 0    Blood-Glucose Meter (TRUE METRIX AIR GLUCOSE METER) monitoring kit Dx: E11.9 1 Kit 0    glucose blood VI test strips (BLOOD GLUCOSE TEST) strip True Metrix Air testing strips Dx:E11.9 100 Strip 0    lancets (TRUEPLUS LANCETS) 33 gauge misc Use as directed to check blood sugar 100 Lancet 0    losartan (COZAAR) 25 mg tablet TAKE ONE TABLET BY MOUTH DAILY 90 Tab 0    metFORMIN (GLUCOPHAGE) 1,000 mg tablet TAKE ONE TABLET BY MOUTH TWICE A DAY WITH MEALS 90 Tab 0    cholecalciferol (VITAMIN D3) 2,000 unit cap capsule Take 2,000 Units by mouth daily. 30 Cap 5    VIT C/E/ZN/COPPR/LUTEIN/ZEAXAN (PRESERVISION AREDS 2 PO) Take  by mouth.  COQ10, UBIQUINOL, PO Take  by mouth.  Calcium-Cholecalciferol, D3, (CALCIUM 500 + D) 500 mg(1,250mg) -400 unit tab Take 1 Tab by mouth two (2) times a day.  180 Tab 4    denosumab (PROLIA) 60 mg/mL injection 1 mL by SubCUTAneous route every 6 months. To be done at [facility]. 1 Syringe 1     No current facility-administered medications on file prior to visit. ALLERGIES:   Allergies   Allergen Reactions    Aspralum [Aspirin, Buffered] Other (comments)     Causes abdominal cramping        MEDICAL/SURGICAL HISTORY:  Past Medical History:   Diagnosis Date    Cataract 04/2017    bilateral     Diabetes ([facility] [address]     Diabetic retinopathy of both eyes ([facility] [address] 04/2017    Hypertension     Macular degeneration 9/2015    Osteoporosis 09/2015    on Prolia starting 2/2017      Past Surgical History:   Procedure Laterality Date    COLONOSCOPY N/A 7/19/2017    COLONOSCOPY performed by Sulema Chu MD at [address] [place] [address] HX [address]  12/2016    right eye        FAMILY HISTORY:  Family History   Problem Relation Age of Onset    Heart Disease Mother     Heart Disease Father     Other Brother         brain aneurysm        SOCIAL HISTORY:  Social History     Tobacco Use    Smoking status: Never Smoker    Smokeless tobacco: Never Used   Substance Use Topics    Alcohol use: No    Drug use: No           HISTORY OF PRESENT ILLNESS: Porsha Machuca is a 75 y.o. female who presents to the office for a routine follow up visit. Diabetes -  Currently the patient's condition is stable, well controlled.   Last A1c: 5.9% on 1/2/19  Today's A1c: 6.0%   Patient reports compliance with the following: metformin 1,000 mg BID, glipizide 5 mg daily     Last urine microalbumin: 1/2/19, mild microalbuminuria noted. Patient is on ARB therapy.    Last lipid panel: 8/2/19, LDL 77 at that time   Last foot exam: 2/2/17  Last eye exam: UTD. Follows closely with Dr. Kevin Tobar for DM retinopathy with macular edema     Hypertension -    Currently, the patient's condition is poorly controlled.    She is prescribed losartan 25 mg daily, but has been out of her meds x about 2 mo.   Home BP readings: not checking      Osteoporosis -   Diagnosed on screening DEXA 9/2015. Her Repeat DEXA 11/2017 showed a 5.3% increase in the lumbar BMD, and less significant increases in the distal radius and b/l proximal femurs.   Patient was originally treated with Fosamax, but was unable to tolerate, and was subsequently switched to Prolia infusions q 6 mo. However, Prolia seemed to cause side effects of diffuse joint aches, and patient also became concerned over a class action lawsuit she had heard about regarding this particular medication. She has subsequently been off meds for about a year, but has remained compliant with her calcium + D supplement. She states she recently heard of a new medication for osteoporosis that she might be interested in trying, but forgets the name. She denies any recent falls        REVIEW OF SYSTEMS:  Review of Systems   Constitutional: Negative for chills, fever and malaise/fatigue. Respiratory: Negative for cough, shortness of breath and wheezing. Cardiovascular: Negative for chest pain, palpitations and leg swelling. Gastrointestinal: Negative for abdominal pain, nausea and vomiting. PHYSICAL EXAMINATION:  Physical Exam   Constitutional: She is oriented to person, place, and time and well-developed, well-nourished, and in no distress. Cardiovascular: Normal rate, regular rhythm and normal heart sounds. Exam reveals no gallop and no friction rub. No murmur heard. Pulmonary/Chest: Effort normal and breath sounds normal. She has no wheezes. She has no rales. Neurological: She is alert and oriented to person, place, and time. Gait normal.   Skin: Skin is warm and dry. No rash noted.    Psychiatric: Mood, memory and affect normal.        RESULTS:  Lab Results   Component Value Date/Time    Sodium 142 08/02/2019 12:00 AM    Potassium 4.2 08/02/2019 12:00 AM    Chloride 102 08/02/2019 12:00 AM    CO2 26 08/02/2019 12:00 AM    Anion gap 6 01/02/2019 10:48 AM    Glucose 91 08/02/2019 12:00 AM    BUN 18 08/02/2019 12:00 AM    Creatinine 0.98 08/02/2019 12:00 AM    BUN/Creatinine ratio 18 08/02/2019 12:00 AM    GFR est AA 65 08/02/2019 12:00 AM    GFR est non-AA 57 (L) 08/02/2019 12:00 AM    Calcium 10.2 08/02/2019 12:00 AM    Bilirubin, total 0.4 08/02/2019 12:00 AM    AST (SGOT) 11 08/02/2019 12:00 AM    Alk. phosphatase 67 08/02/2019 12:00 AM    Protein, total 6.8 08/02/2019 12:00 AM    Albumin 4.5 08/02/2019 12:00 AM    Globulin 3.4 01/02/2019 10:48 AM    A-G Ratio 2.0 08/02/2019 12:00 AM    ALT (SGPT) 11 08/02/2019 12:00 AM      Lab Results   Component Value Date/Time    Hemoglobin A1c 5.9 (H) 01/02/2019 10:48 AM    Hemoglobin A1c (POC) 5.5 12/13/2017 12:45 PM      Lab Results   Component Value Date/Time    Cholesterol, total 183 08/02/2019 12:00 AM    HDL Cholesterol 37 (L) 08/02/2019 12:00 AM    LDL, calculated 77 08/02/2019 12:00 AM    VLDL, calculated 69 (H) 08/02/2019 12:00 AM    Triglyceride 344 (H) 08/02/2019 12:00 AM    CHOL/HDL Ratio 6.1 (H) 01/02/2019 10:48 AM      Lab Results   Component Value Date/Time    Vitamin D 25-Hydroxy 27.6 (L) 01/02/2019 10:48 AM    VITAMIN D, 25-HYDROXY 56.2 08/02/2019 12:00 AM          ASSESSMENT/PLAN:  Diagnoses and all orders for this visit:    1. Type 2 diabetes mellitus with microalbuminuria, without long-term current use of insulin (HCC)  - Well controlled  - Continue current regimen  - Continue to work on diet and exercise  Orders:    -     AMB POC HEMOGLOBIN A1C    2. Diabetic macular edema ([facility] [address]  3. 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 Jonathan Perez     4. Essential hypertension  - Well controlled  - Continue current regimen    5.  Osteoporosis, unspecified osteoporosis type, unspecified pathological fracture presence  - Due to repeat DEXA in November  - patient will call back with the name of the medication she is interested in  - Also offered referral to specialist (endocrinology vs. rheumatology) to discuss other management options  Orders:    -     DEXA BONE DENSITY STUDY AXIAL; Future      Follow-up and Dispositions    · Return in about 6 months (around 2/5/2020) for Medicare Wellness Visit, POC A1c at visit . All questions answered. Patient expresses understanding and agrees with the plan as detailed above.     PATIENT CARE TEAM:   Patient Care Team:  JOHNNY Root as PCP - General (Physician Assistant)  Lisa Huang MD (Ophthalmology)       JOHNNY Robles   August 5, 2019   4:29 PM

## 2019-08-06 ENCOUNTER — TELEPHONE (OUTPATIENT)
Dept: FAMILY MEDICINE CLINIC | Age: 74
End: 2019-08-06

## 2019-08-06 NOTE — TELEPHONE ENCOUNTER
Pt is scheduled to have cataract surgery on August 21, 2019.  Please call Joni Rodas at [facility] at [phone]

## 2019-08-16 ENCOUNTER — TELEPHONE (OUTPATIENT)
Dept: FAMILY MEDICINE CLINIC | Age: 74
End: 2019-08-16

## 2019-08-16 NOTE — TELEPHONE ENCOUNTER
Pt missed her appt on 8/15/2019 for her pre-op and since she was just here and had labs done if she still needs to have the appt. Please advise.  Thank you

## 2019-08-19 NOTE — TELEPHONE ENCOUNTER
Solomon Butterfield [phone] (home)  advising patient that [facility] can clear her for cataract removal based on her 8/5/19 office visit.

## 2019-08-19 NOTE — TELEPHONE ENCOUNTER
Per ophthalmology notes, no preop testing is needed. I am okay with clearing patient based on her recent routine visit 8/5/19. Forms completed.

## 2019-08-19 NOTE — TELEPHONE ENCOUNTER
Completed pre op form faxed to [facility] [phone]. Confirmation received.

## 2019-09-01 DIAGNOSIS — E11.29 TYPE 2 DIABETES MELLITUS WITH MICROALBUMINURIA, WITHOUT LONG-TERM CURRENT USE OF INSULIN (HCC): ICD-10-CM

## 2019-09-01 DIAGNOSIS — R80.9 TYPE 2 DIABETES MELLITUS WITH MICROALBUMINURIA, WITHOUT LONG-TERM CURRENT USE OF INSULIN (HCC): ICD-10-CM

## 2019-09-02 RX ORDER — SIMVASTATIN 20 MG/1
TABLET, FILM COATED ORAL
Qty: 90 TAB | Refills: 1 | Status: SHIPPED | OUTPATIENT
Start: 2019-09-02 | End: 2019-10-10 | Stop reason: SDUPTHER

## 2019-10-10 DIAGNOSIS — R80.9 TYPE 2 DIABETES MELLITUS WITH MICROALBUMINURIA, WITHOUT LONG-TERM CURRENT USE OF INSULIN (HCC): ICD-10-CM

## 2019-10-10 DIAGNOSIS — I10 ESSENTIAL HYPERTENSION: ICD-10-CM

## 2019-10-10 DIAGNOSIS — E11.29 TYPE 2 DIABETES MELLITUS WITH MICROALBUMINURIA, WITHOUT LONG-TERM CURRENT USE OF INSULIN (HCC): ICD-10-CM

## 2019-10-10 RX ORDER — SIMVASTATIN 20 MG/1
TABLET, FILM COATED ORAL
Qty: 90 TAB | Refills: 0 | Status: SHIPPED | OUTPATIENT
Start: 2019-10-10 | End: 2019-12-19

## 2019-10-10 RX ORDER — LANCETS 33 GAUGE
EACH MISCELLANEOUS
Qty: 100 LANCET | Refills: 3 | Status: SHIPPED | OUTPATIENT
Start: 2019-10-10 | End: 2019-12-19

## 2019-10-10 RX ORDER — LOSARTAN POTASSIUM 25 MG/1
TABLET ORAL
Qty: 90 TAB | Refills: 0 | Status: SHIPPED | OUTPATIENT
Start: 2019-10-10 | End: 2019-12-19

## 2019-10-10 RX ORDER — GLIPIZIDE 5 MG/1
TABLET ORAL
Qty: 90 TAB | Refills: 0 | Status: SHIPPED | OUTPATIENT
Start: 2019-10-10 | End: 2019-12-19

## 2019-10-10 RX ORDER — METFORMIN HYDROCHLORIDE 1000 MG/1
TABLET ORAL
Qty: 180 TAB | Refills: 0 | Status: SHIPPED | OUTPATIENT
Start: 2019-10-10 | End: 2019-12-19

## 2019-12-19 DIAGNOSIS — E11.29 TYPE 2 DIABETES MELLITUS WITH MICROALBUMINURIA, WITHOUT LONG-TERM CURRENT USE OF INSULIN (HCC): ICD-10-CM

## 2019-12-19 DIAGNOSIS — R80.9 TYPE 2 DIABETES MELLITUS WITH MICROALBUMINURIA, WITHOUT LONG-TERM CURRENT USE OF INSULIN (HCC): ICD-10-CM

## 2019-12-19 DIAGNOSIS — I10 ESSENTIAL HYPERTENSION: ICD-10-CM

## 2019-12-19 RX ORDER — SIMVASTATIN 20 MG/1
TABLET, FILM COATED ORAL
Qty: 90 TAB | Refills: 0 | Status: SHIPPED | OUTPATIENT
Start: 2019-12-19 | End: 2020-02-05 | Stop reason: SDUPTHER

## 2019-12-19 RX ORDER — METFORMIN HYDROCHLORIDE 1000 MG/1
TABLET ORAL
Qty: 180 TAB | Refills: 0 | Status: SHIPPED | OUTPATIENT
Start: 2019-12-19 | End: 2020-02-05 | Stop reason: SDUPTHER

## 2019-12-19 RX ORDER — LOSARTAN POTASSIUM 25 MG/1
TABLET ORAL
Qty: 90 TAB | Refills: 0 | Status: SHIPPED | OUTPATIENT
Start: 2019-12-19 | End: 2020-02-05 | Stop reason: SDUPTHER

## 2019-12-19 RX ORDER — LANCETS 33 GAUGE
EACH MISCELLANEOUS
Qty: 300 LANCET | Refills: 0 | Status: SHIPPED | OUTPATIENT
Start: 2019-12-19 | End: 2020-11-16 | Stop reason: SDUPTHER

## 2019-12-19 RX ORDER — GLIPIZIDE 5 MG/1
TABLET ORAL
Qty: 90 TAB | Refills: 0 | Status: SHIPPED | OUTPATIENT
Start: 2019-12-19 | End: 2020-02-05 | Stop reason: SDUPTHER

## 2020-01-19 NOTE — PROGRESS NOTES
Patient: Rossy Ledesma MRN: [id number]  SSN: [id number]    YOB: 1945  Age: 75 y.o. Sex: female      Date of Service: 1/20/2020   Provider: JOHNNY Viramontes   Office Location:   [facility], [address] [place], [address]  Office Phone: [phone]  Office Fax: [phone]      REASON FOR VISIT:   Chief Complaint   Patient presents with    Pre-op Exam     left cataract removal on 01- [facility] and [address] [place] [address] Bl     Back Pain     c/o right lower back pain         VITALS:   Visit Vitals  /62 (BP 1 Location: Left arm, BP Patient Position: Sitting)   Pulse 73   Temp 98.4 °F (36.9 °C) (Oral)   Resp 18   Ht 5' 4\" (1.626 m)   Wt 124 lb (56.2 kg)   SpO2 97%   BMI 21.28 kg/m²       MEDICATIONS:   Current Outpatient Medications on File Prior to Visit   Medication Sig Dispense Refill    metFORMIN (GLUCOPHAGE) 1,000 mg tablet TAKE 1 TABLET TWICE DAILY WITH MEALS 180 Tab 0    losartan (COZAAR) 25 mg tablet TAKE 1 TABLET EVERY DAY 90 Tab 0    simvastatin (ZOCOR) 20 mg tablet TAKE 1 TABLET EVERY NIGHT 90 Tab 0    glipiZIDE (GLUCOTROL) 5 mg tablet TAKE 1 TABLET EVERY DAY 90 Tab 0    cholecalciferol (VITAMIN D3) 2,000 unit cap capsule Take 2,000 Units by mouth daily. 30 Cap 5    VIT C/E/ZN/COPPR/LUTEIN/ZEAXAN (PRESERVISION AREDS 2 PO) Take  by mouth.  COQ10, UBIQUINOL, PO Take  by mouth.  lancets 33 gauge misc TEST BLOOD SUGAR AS DIRECTED 300 Lancet 0    glucose blood VI test strips (TRUE METRIX GLUCOSE TEST STRIP) strip USE AS DIRECTED 100 Strip 0    Blood-Glucose Meter (TRUE METRIX AIR GLUCOSE METER) monitoring kit Dx: E11.9 1 Kit 0    Calcium-Cholecalciferol, D3, (CALCIUM 500 + D) 500 mg(1,250mg) -400 unit tab Take 1 Tab by mouth two (2) times a day. 180 Tab 4    denosumab (PROLIA) 60 mg/mL injection 1 mL by SubCUTAneous route every 6 months. To be done at [facility].  1 Syringe 1 No current facility-administered medications on file prior to visit. ALLERGIES:   Allergies   Allergen Reactions    Aspralum [Aspirin, Buffered] Other (comments)     Causes abdominal cramping        PAST MEDICAL HISTORY:  Past Medical History:   Diagnosis Date    Cataract 04/2017    bilateral     Diabetes ([facility] [address]     Diabetic retinopathy of both eyes ([facility] [address] 04/2017    Hypertension     Macular degeneration 9/2015    Osteoporosis 09/2015    on Prolia starting 2/2017        SURGICAL HISTORY:  Past Surgical History:   Procedure Laterality Date    COLONOSCOPY N/A 7/19/2017    COLONOSCOPY performed by Claudia Max MD at [address]  12/2016    right eye        FAMILY HISTORY:  Family History   Problem Relation Age of Onset    Heart Disease Mother     Heart Disease Father     Other Brother         brain aneurysm        SOCIAL HISTORY:  Social History     Socioeconomic History    Marital status:      Spouse name: Not on file    Number of children: Not on file    Years of education: Not on file    Highest education level: Not on file   Tobacco Use    Smoking status: Never Smoker    Smokeless tobacco: Never Used   Substance and Sexual Activity    Alcohol use: No    Drug use: No    Sexual activity: Not Currently            HPI:   Kenyatta Connolly is a 75 y.o. female who presents to the office for preoperative clearance. Procedure: L eye cataract removal   Date of Procedure: 1/29/20  Location of Procedure: [facility]   Performing Surgeon: Dr. Ruben Pitt  Type of anesthesia: Topical with MAC    Current status of pertinent medical problems: Hypertension and diabetes have been well controlled   Tobacco use:  reports that she has never smoked. She has never used smokeless tobacco.   Alcohol use:  reports no history of alcohol use. Drug use: Patient denies use of illicit drugs.      Anesthesia history: Patient denies adverse reactions to anesthesia in the past. Cardiovascular status: Patient denies history of MI, CAD, hx CABG/stent placement, or hx abnormal stress test   Pulmonary status: Patient denies asthma, COPD, or HERNESTO  Hematologic status: Patient denies bleeding or clotting disorders, no history of DVT or PE  Functional status: Patient is functionally independent   Disposition post-surgery: Home same day, has transportation       REVIEW OF SYSTEMS:  Review of Systems   Constitutional: Negative for chills, fever and malaise/fatigue. HENT: Negative for congestion and sore throat. Eyes: Negative for pain, discharge and redness. Respiratory: Negative for cough, shortness of breath and wheezing. Cardiovascular: Negative for chest pain, palpitations and leg swelling. Gastrointestinal: Negative for abdominal pain, constipation, diarrhea, nausea and vomiting. Genitourinary: Negative for dysuria, frequency and urgency. Musculoskeletal: Positive for back pain. Skin: Negative for itching and rash. Neurological: Negative for dizziness and headaches. PHYSICAL EXAMINATION:  Physical Exam  HENT:      Head: Normocephalic and atraumatic. Right Ear: Tympanic membrane and external ear normal.      Left Ear: Tympanic membrane and external ear normal.      Nose: Nose normal.      Mouth/Throat:      Pharynx: Uvula midline. Eyes:      General:         Right eye: No discharge. Left eye: No discharge. Conjunctiva/sclera: Conjunctivae normal.      Pupils: Pupils are equal, round, and reactive to light. Neck:      Musculoskeletal: Neck supple. Thyroid: No thyromegaly. Cardiovascular:      Rate and Rhythm: Normal rate and regular rhythm. Heart sounds: Normal heart sounds. No murmur. No friction rub. No gallop. Pulmonary:      Effort: Pulmonary effort is normal.      Breath sounds: Normal breath sounds. No stridor. No wheezing or rales. Abdominal:      General: Bowel sounds are normal. There is no distension.       Palpations: Abdomen is soft. There is no mass. Tenderness: There is no tenderness. There is no guarding or rebound. Lymphadenopathy:      Cervical: No cervical adenopathy. Skin:     General: Skin is warm and dry. Findings: No rash. Neurological:      Mental Status: She is alert and oriented to person, place, and time. Gait: Gait is intact. Psychiatric:         Mood and Affect: Mood and affect normal.         Cognition and Memory: Memory normal.          RESULTS:  No results found for this visit on 01/20/20. ASSESSMENT/PLAN:  Diagnoses and all orders for this visit:    1. Preop examination  2. Senile cataract of left eye, unspecified age-related cataract type  - Normal physical exam findings as detailed above  - Preoperative testing not required  - Patient is cleared for the proposed procedure     Follow-up and Dispositions    · Return in about 16 days (around 2/5/2020) for annual medicare wellness/routine care .           JOHNNY Guerra   1/20/2020   1:44 PM

## 2020-01-20 ENCOUNTER — OFFICE VISIT (OUTPATIENT)
Dept: FAMILY MEDICINE CLINIC | Age: 75
End: 2020-01-20

## 2020-01-20 VITALS
OXYGEN SATURATION: 97 % | WEIGHT: 124 LBS | TEMPERATURE: 98.4 F | DIASTOLIC BLOOD PRESSURE: 62 MMHG | RESPIRATION RATE: 18 BRPM | SYSTOLIC BLOOD PRESSURE: 120 MMHG | BODY MASS INDEX: 21.17 KG/M2 | HEIGHT: 64 IN | HEART RATE: 73 BPM

## 2020-01-20 DIAGNOSIS — Z01.818 PREOP EXAMINATION: Primary | ICD-10-CM

## 2020-01-20 DIAGNOSIS — H25.9 SENILE CATARACT OF LEFT EYE, UNSPECIFIED AGE-RELATED CATARACT TYPE: ICD-10-CM

## 2020-01-20 NOTE — PATIENT INSTRUCTIONS
Cataract Surgery: Before Your Surgery  What is cataract surgery? Cataracts are cloudy areas in the lens of your eye. Your lens is behind the colored part of your eye (iris). Its job is to focus light onto the back of your eye. In some people, cataracts prevent light from reaching the back of the eye. This can cause vision problems. Cataract surgery helps you see better. It replaces your natural lens, which has become cloudy, with a clear artificial one. There are two types of cataract surgery. Phacoemulsification (say \"rhkq-hl-if-zop-ckf-sly-RUDI-shun\") is the most common type. The doctor makes a small cut in your eye. This cut is called an incision. The doctor uses a special ultrasound tool to break your cloudy lens apart. Sometimes a laser is used too. Then he or she removes the small pieces of the lens through the incision. In most cases, the doctor then inserts an artificial lens through the incision. Most people do not need stitches, because the incision is so small. If the doctor is not able to put in an artificial lens, you can wear a contact lens or thick glasses in place of your natural lens. Extracapsular extraction is a less common type of cataract surgery. The doctor makes a larger incision to remove the whole lens at once. After the doctor removes the lens, he or she stitches up the incision. Recovery from this type of surgery takes longer. Before either surgery, the doctor puts numbing drops in your eye. Some doctors use a shot instead. You may also get medicine to make you feel relaxed. You probably will not feel much pain. The surgery takes about 20 to 40 minutes. After surgery, you may have a bandage or shield on your eye. You will probably go home from surgery after 1 hour in the recovery room. Most people see better in 1 to 3 days. You may be able to go back to work or your normal routine in a few days.  It could take 3 to 10 weeks for your eye to completely heal. After your eye heals, you may still need to wear glasses, especially for reading. Follow-up care is a key part of your treatment and safety. Be sure to make and go to all appointments, and call your doctor if you are having problems. It's also a good idea to know your test results and keep a list of the medicines you take. What happens before surgery?   Surgery can be stressful. This information will help you understand what you can expect. And it will help you safely prepare for surgery.   Preparing for surgery    · Understand exactly what surgery is planned, along with the risks, benefits, and other options. · Tell your doctors ALL the medicines, vitamins, supplements, and herbal remedies you take. Some of these can increase the risk of bleeding or interact with anesthesia.     · If you take blood thinners, such as warfarin (Coumadin), clopidogrel (Plavix), or aspirin, be sure to talk to your doctor. He or she will tell you if you should stop taking these medicines before your surgery. Make sure that you understand exactly what your doctor wants you to do.     · Your doctor will tell you which medicines to take or stop before your surgery. You may need to stop taking certain medicines a week or more before surgery. So talk to your doctor as soon as you can.     · If you have an advance directive, let your doctor know. It may include a living will and a durable power of  for health care. Bring a copy to the hospital. If you don't have one, you may want to prepare one. It lets your doctor and loved ones know your health care wishes. Doctors advise that everyone prepare these papers before any type of surgery or procedure. What happens on the day of surgery? · Follow the instructions exactly about when to stop eating and drinking. If you don't, your surgery may be canceled.  If your doctor told you to take your medicines on the day of surgery, take them with only a sip of water.     · Take a bath or shower before you come in for your surgery. Do not apply lotions, perfumes, deodorants, or nail polish.     · Take off all jewelry and piercings. And take out contact lenses, if you wear them.    At the hospital or surgery center   · Bring a picture ID.     · The area for surgery is often marked to make sure there are no errors.     · You will be kept comfortable and safe by your anesthesia provider. The anesthesia may make you sleep. Or it may just numb the area being worked on.     · The surgery will take about 20 to 40 minutes. Going home   · Be sure you have someone to drive you home. Anesthesia and pain medicine make it unsafe for you to drive.     · You will be given more specific instructions about recovering from your surgery. They will cover things like diet, wound care, follow-up care, driving, and getting back to your normal routine.     · You may have a bandage or patch over your eye. You may also have a clear shield over your eye. This prevents you from rubbing it. When should you call your doctor? · You have questions or concerns.     · You don't understand how to prepare for your surgery.     · You become ill before the surgery (such as fever, flu, or a cold).     · You need to reschedule or have changed your mind about having the surgery. Where can you learn more? Go to http://ruiz-tino.info/. Enter K474 in the search box to learn more about \"Cataract Surgery: Before Your Surgery. \"  Current as of: May 5, 2019  Content Version: 12.2  © 2769-2698 [employer]. Care instructions adapted under license by [employer] (which disclaims liability or warranty for this information). If you have questions about a medical condition or this instruction, always ask your healthcare professional. [place] [address] any warranty or liability for your use of this information. Cataract Surgery: Before Your Surgery  What is cataract surgery?     Cataracts are cloudy areas in the lens of your eye. Your lens is behind the colored part of your eye (iris). Its job is to focus light onto the back of your eye. In some people, cataracts prevent light from reaching the back of the eye. This can cause vision problems. Cataract surgery helps you see better. It replaces your natural lens, which has become cloudy, with a clear artificial one. There are two types of cataract surgery. Phacoemulsification (say \"segi-ic-uj-grq-ozx-jzr-RUDI-shun\") is the most common type. The doctor makes a small cut in your eye. This cut is called an incision. The doctor uses a special ultrasound tool to break your cloudy lens apart. Sometimes a laser is used too. Then he or she removes the small pieces of the lens through the incision. In most cases, the doctor then inserts an artificial lens through the incision. Most people do not need stitches, because the incision is so small. If the doctor is not able to put in an artificial lens, you can wear a contact lens or thick glasses in place of your natural lens. Extracapsular extraction is a less common type of cataract surgery. The doctor makes a larger incision to remove the whole lens at once. After the doctor removes the lens, he or she stitches up the incision. Recovery from this type of surgery takes longer. Before either surgery, the doctor puts numbing drops in your eye. Some doctors use a shot instead. You may also get medicine to make you feel relaxed. You probably will not feel much pain. The surgery takes about 20 to 40 minutes. After surgery, you may have a bandage or shield on your eye. You will probably go home from surgery after 1 hour in the recovery room. Most people see better in 1 to 3 days. You may be able to go back to work or your normal routine in a few days. It could take 3 to 10 weeks for your eye to completely heal. After your eye heals, you may still need to wear glasses, especially for reading.   Follow-up care is a key part of your treatment and safety. Be sure to make and go to all appointments, and call your doctor if you are having problems. It's also a good idea to know your test results and keep a list of the medicines you take. What happens before surgery?   Surgery can be stressful. This information will help you understand what you can expect. And it will help you safely prepare for surgery.   Preparing for surgery    · Understand exactly what surgery is planned, along with the risks, benefits, and other options. · Tell your doctors ALL the medicines, vitamins, supplements, and herbal remedies you take. Some of these can increase the risk of bleeding or interact with anesthesia.     · If you take blood thinners, such as warfarin (Coumadin), clopidogrel (Plavix), or aspirin, be sure to talk to your doctor. He or she will tell you if you should stop taking these medicines before your surgery. Make sure that you understand exactly what your doctor wants you to do.     · Your doctor will tell you which medicines to take or stop before your surgery. You may need to stop taking certain medicines a week or more before surgery. So talk to your doctor as soon as you can.     · If you have an advance directive, let your doctor know. It may include a living will and a durable power of  for health care. Bring a copy to the hospital. If you don't have one, you may want to prepare one. It lets your doctor and loved ones know your health care wishes. Doctors advise that everyone prepare these papers before any type of surgery or procedure. What happens on the day of surgery? · Follow the instructions exactly about when to stop eating and drinking. If you don't, your surgery may be canceled. If your doctor told you to take your medicines on the day of surgery, take them with only a sip of water.     · Take a bath or shower before you come in for your surgery.  Do not apply lotions, perfumes, deodorants, or nail polish.     · Take off all jewelry and piercings. And take out contact lenses, if you wear them.    At the hospital or surgery center   · Bring a picture ID.     · The area for surgery is often marked to make sure there are no errors.     · You will be kept comfortable and safe by your anesthesia provider. The anesthesia may make you sleep. Or it may just numb the area being worked on.     · The surgery will take about 20 to 40 minutes. Going home   · Be sure you have someone to drive you home. Anesthesia and pain medicine make it unsafe for you to drive.     · You will be given more specific instructions about recovering from your surgery. They will cover things like diet, wound care, follow-up care, driving, and getting back to your normal routine.     · You may have a bandage or patch over your eye. You may also have a clear shield over your eye. This prevents you from rubbing it. When should you call your doctor? · You have questions or concerns.     · You don't understand how to prepare for your surgery.     · You become ill before the surgery (such as fever, flu, or a cold).     · You need to reschedule or have changed your mind about having the surgery. Where can you learn more? Go to http://ruiz-tino.info/. Enter K474 in the search box to learn more about \"Cataract Surgery: Before Your Surgery. \"  Current as of: May 5, 2019  Content Version: 12.2  © 0284-2852 [employer]. Care instructions adapted under license by [employer] (which disclaims liability or warranty for this information). If you have questions about a medical condition or this instruction, always ask your healthcare professional. [place] [address] any warranty or liability for your use of this information. Cataract Surgery: Before Your Surgery  What is cataract surgery? Cataracts are cloudy areas in the lens of your eye. Your lens is behind the colored part of your eye (iris).  Its job is to focus light onto the back of your eye. In some people, cataracts prevent light from reaching the back of the eye. This can cause vision problems. Cataract surgery helps you see better. It replaces your natural lens, which has become cloudy, with a clear artificial one. There are two types of cataract surgery. Phacoemulsification (say \"egcf-jb-hg-xse-kzw-ksk-RUDI-shun\") is the most common type. The doctor makes a small cut in your eye. This cut is called an incision. The doctor uses a special ultrasound tool to break your cloudy lens apart. Sometimes a laser is used too. Then he or she removes the small pieces of the lens through the incision. In most cases, the doctor then inserts an artificial lens through the incision. Most people do not need stitches, because the incision is so small. If the doctor is not able to put in an artificial lens, you can wear a contact lens or thick glasses in place of your natural lens. Extracapsular extraction is a less common type of cataract surgery. The doctor makes a larger incision to remove the whole lens at once. After the doctor removes the lens, he or she stitches up the incision. Recovery from this type of surgery takes longer. Before either surgery, the doctor puts numbing drops in your eye. Some doctors use a shot instead. You may also get medicine to make you feel relaxed. You probably will not feel much pain. The surgery takes about 20 to 40 minutes. After surgery, you may have a bandage or shield on your eye. You will probably go home from surgery after 1 hour in the recovery room. Most people see better in 1 to 3 days. You may be able to go back to work or your normal routine in a few days. It could take 3 to 10 weeks for your eye to completely heal. After your eye heals, you may still need to wear glasses, especially for reading. Follow-up care is a key part of your treatment and safety.  Be sure to make and go to all appointments, and call your doctor if you are having problems. It's also a good idea to know your test results and keep a list of the medicines you take. What happens before surgery?   Surgery can be stressful. This information will help you understand what you can expect. And it will help you safely prepare for surgery.   Preparing for surgery    · Understand exactly what surgery is planned, along with the risks, benefits, and other options. · Tell your doctors ALL the medicines, vitamins, supplements, and herbal remedies you take. Some of these can increase the risk of bleeding or interact with anesthesia.     · If you take blood thinners, such as warfarin (Coumadin), clopidogrel (Plavix), or aspirin, be sure to talk to your doctor. He or she will tell you if you should stop taking these medicines before your surgery. Make sure that you understand exactly what your doctor wants you to do.     · Your doctor will tell you which medicines to take or stop before your surgery. You may need to stop taking certain medicines a week or more before surgery. So talk to your doctor as soon as you can.     · If you have an advance directive, let your doctor know. It may include a living will and a durable power of  for health care. Bring a copy to the hospital. If you don't have one, you may want to prepare one. It lets your doctor and loved ones know your health care wishes. Doctors advise that everyone prepare these papers before any type of surgery or procedure. What happens on the day of surgery? · Follow the instructions exactly about when to stop eating and drinking. If you don't, your surgery may be canceled. If your doctor told you to take your medicines on the day of surgery, take them with only a sip of water.     · Take a bath or shower before you come in for your surgery. Do not apply lotions, perfumes, deodorants, or nail polish.     · Take off all jewelry and piercings.  And take out contact lenses, if you wear them.    At the hospital or surgery center   · Bring a picture ID.     · The area for surgery is often marked to make sure there are no errors.     · You will be kept comfortable and safe by your anesthesia provider. The anesthesia may make you sleep. Or it may just numb the area being worked on.     · The surgery will take about 20 to 40 minutes. Going home   · Be sure you have someone to drive you home. Anesthesia and pain medicine make it unsafe for you to drive.     · You will be given more specific instructions about recovering from your surgery. They will cover things like diet, wound care, follow-up care, driving, and getting back to your normal routine.     · You may have a bandage or patch over your eye. You may also have a clear shield over your eye. This prevents you from rubbing it. When should you call your doctor? · You have questions or concerns.     · You don't understand how to prepare for your surgery.     · You become ill before the surgery (such as fever, flu, or a cold).     · You need to reschedule or have changed your mind about having the surgery. Where can you learn more? Go to http://ruiz-tino.info/. Enter K474 in the search box to learn more about \"Cataract Surgery: Before Your Surgery. \"  Current as of: May 5, 2019  Content Version: 12.2  © 9065-2134 [employer]. Care instructions adapted under license by [employer] (which disclaims liability or warranty for this information). If you have questions about a medical condition or this instruction, always ask your healthcare professional. [place] [address] any warranty or liability for your use of this information.     Central Scheduling for (Dexa Scan) [phone]

## 2020-01-20 NOTE — PROGRESS NOTES
1. Have you been to the ER, urgent care clinic since your last visit? Hospitalized since your last visit? No    2. Have you seen or consulted any other health care providers outside of the [address] [place] [address] since your last visit? Include any pap smears or colon screening.  Yes 01- [facility] eye exan Dr. Sam Bullard

## 2020-02-05 ENCOUNTER — OFFICE VISIT (OUTPATIENT)
Dept: FAMILY MEDICINE CLINIC | Age: 75
End: 2020-02-05

## 2020-02-05 VITALS
HEIGHT: 64 IN | WEIGHT: 123 LBS | HEART RATE: 80 BPM | DIASTOLIC BLOOD PRESSURE: 70 MMHG | RESPIRATION RATE: 16 BRPM | BODY MASS INDEX: 21 KG/M2 | OXYGEN SATURATION: 99 % | TEMPERATURE: 98.8 F | SYSTOLIC BLOOD PRESSURE: 102 MMHG

## 2020-02-05 DIAGNOSIS — E11.29 TYPE 2 DIABETES MELLITUS WITH MICROALBUMINURIA, WITHOUT LONG-TERM CURRENT USE OF INSULIN (HCC): Primary | ICD-10-CM

## 2020-02-05 DIAGNOSIS — M81.0 AGE-RELATED OSTEOPOROSIS WITHOUT CURRENT PATHOLOGICAL FRACTURE: ICD-10-CM

## 2020-02-05 DIAGNOSIS — E11.311 DIABETIC MACULAR EDEMA (HCC): ICD-10-CM

## 2020-02-05 DIAGNOSIS — R80.9 TYPE 2 DIABETES MELLITUS WITH MICROALBUMINURIA, WITHOUT LONG-TERM CURRENT USE OF INSULIN (HCC): Primary | ICD-10-CM

## 2020-02-05 DIAGNOSIS — Z00.00 MEDICARE ANNUAL WELLNESS VISIT, SUBSEQUENT: ICD-10-CM

## 2020-02-05 DIAGNOSIS — E11.3593 PROLIFERATIVE DIABETIC RETINOPATHY OF BOTH EYES ASSOCIATED WITH TYPE 2 DIABETES MELLITUS, UNSPECIFIED PROLIFERATIVE RETINOPATHY TYPE (HCC): ICD-10-CM

## 2020-02-05 DIAGNOSIS — I10 ESSENTIAL HYPERTENSION: ICD-10-CM

## 2020-02-05 LAB — HBA1C MFR BLD HPLC: 5.9 %

## 2020-02-05 RX ORDER — SIMVASTATIN 20 MG/1
TABLET, FILM COATED ORAL
Qty: 90 TAB | Refills: 1 | Status: SHIPPED | OUTPATIENT
Start: 2020-02-05 | End: 2020-10-02 | Stop reason: ALTCHOICE

## 2020-02-05 RX ORDER — LOSARTAN POTASSIUM 25 MG/1
TABLET ORAL
Qty: 90 TAB | Refills: 1 | Status: SHIPPED | OUTPATIENT
Start: 2020-02-05 | End: 2020-04-15 | Stop reason: SDUPTHER

## 2020-02-05 RX ORDER — METFORMIN HYDROCHLORIDE 1000 MG/1
TABLET ORAL
Qty: 180 TAB | Refills: 1 | Status: SHIPPED | OUTPATIENT
Start: 2020-02-05 | End: 2020-04-15 | Stop reason: SDUPTHER

## 2020-02-05 RX ORDER — GLIPIZIDE 5 MG/1
TABLET ORAL
Qty: 90 TAB | Refills: 1 | Status: SHIPPED | OUTPATIENT
Start: 2020-02-05 | End: 2020-03-31 | Stop reason: SDUPTHER

## 2020-02-05 NOTE — PROGRESS NOTES
This is the Subsequent Medicare Annual Wellness Exam, performed 12 months or more after the Initial AWV or the last Subsequent AWV    I have reviewed the patient's medical history in detail and updated the computerized patient record. History     Patient Active Problem List   Diagnosis Code    Essential hypertension I10    Osteoporosis M81.0    Type II diabetes mellitus ([address] E11.9    Macular degeneration H35.30    Diabetic retinopathy of both eyes ([address] E11.319    Diabetic macular edema ([address] E11.311     Past Medical History:   Diagnosis Date    Cataract 04/2017    bilateral     Diabetes ([address]     Diabetic retinopathy of both eyes ([address] 04/2017    Hypertension     Macular degeneration 9/2015    Osteoporosis 09/2015    on Prolia starting 2/2017      Past Surgical History:   Procedure Laterality Date    COLONOSCOPY N/A 7/19/2017    COLONOSCOPY performed by Eileen Alvarez MD at [address] HX REFRACTIVE SURGERY  12/2016    right eye     Current Outpatient Medications   Medication Sig Dispense Refill    metFORMIN (GLUCOPHAGE) 1,000 mg tablet TAKE 1 TABLET TWICE DAILY WITH MEALS 180 Tab 0    losartan (COZAAR) 25 mg tablet TAKE 1 TABLET EVERY DAY 90 Tab 0    simvastatin (ZOCOR) 20 mg tablet TAKE 1 TABLET EVERY NIGHT 90 Tab 0    glipiZIDE (GLUCOTROL) 5 mg tablet TAKE 1 TABLET EVERY DAY 90 Tab 0    lancets 33 gauge misc TEST BLOOD SUGAR AS DIRECTED 300 Lancet 0    glucose blood VI test strips (TRUE METRIX GLUCOSE TEST STRIP) strip USE AS DIRECTED 100 Strip 0    Blood-Glucose Meter (TRUE METRIX AIR GLUCOSE METER) monitoring kit Dx: E11.9 1 Kit 0    cholecalciferol (VITAMIN D3) 2,000 unit cap capsule Take 2,000 Units by mouth daily. 30 Cap 5    VIT C/E/ZN/COPPR/LUTEIN/ZEAXAN (PRESERVISION AREDS 2 PO) Take  by mouth.  COQ10, UBIQUINOL, PO Take  by mouth.  Calcium-Cholecalciferol, D3, (CALCIUM 500 + D) 500 mg(1,250mg) -400 unit tab Take 1 Tab by mouth two (2) times a day.  180 Tab 4    denosumab (PROLIA) 60 mg/mL injection 1 mL by SubCUTAneous route every 6 months. To be done at [facility]. 1 Syringe 1     Allergies   Allergen Reactions    Aspralum [Aspirin, Buffered] Other (comments)     Causes abdominal cramping       Family History   Problem Relation Age of Onset    Heart Disease Mother     Heart Disease Father     Other Brother         brain aneurysm     Social History     Tobacco Use    Smoking status: Never Smoker    Smokeless tobacco: Never Used   Substance Use Topics    Alcohol use: No       Depression Risk Factor Screening:     3 most recent PHQ Screens 1/20/2020   Little interest or pleasure in doing things Not at all   Feeling down, depressed, irritable, or hopeless Not at all   Total Score PHQ 2 0       Alcohol Risk Factor Screening:   Do you average 1 drink per night or more than 7 drinks a week:  No    On any one occasion in the past three months have you have had more than 3 drinks containing alcohol:  No      Functional Ability and Level of Safety:   Hearing: Hearing is good. Activities of Daily Living: The home contains: handrails and grab bars  Patient does total self care    Ambulation: with no difficulty    Fall Risk:  Fall Risk Assessment, last 12 mths 1/20/2020   Able to walk? Yes   Fall in past 12 months? No   Fall with injury? -   Number of falls in past 12 months -   Fall Risk Score -       Abuse Screen:  Abuse Screening Questionnaire 1/20/2020   Do you ever feel afraid of your partner? N   Are you in a relationship with someone who physically or mentally threatens you? N   Is it safe for you to go home?  Y        Cognitive Screening   Has your family/caregiver stated any concerns about your memory: no      Patient Care Team   Patient Care Team:  JOHNNY Roper as PCP - General (Physician Assistant)  JOHNNY Roper as PCP - [facility] Empaneled Provider  Lucia Fernando MD (Ophthalmology)  Samara Rouse MD (Ophthalmology)    Assessment/Plan   Education and counseling provided:  Are appropriate based on today's review and evaluation  End-of-Life planning (with patient's consent)  Screening Mammography  Bone mass measurement (DEXA)    Diagnoses and all orders for this visit:    1.  Medicare annual wellness visit, subsequent  - Medicare Wellness visit completed  - Health screenings reviewed  - Age and gender specific counseling provided      Health Maintenance Due   Topic Date Due    DTaP/Tdap/Td series (1 - Tdap) 02/15/1956    Shingrix Vaccine Age 50> (1 of 2) 02/15/1994    Foot Exam Q1  02/02/2018    Eye Exam Retinal or Dilated  04/05/2019    Influenza Age 9 to Adult  08/01/2019    Bone Densitometry  11/14/2019    MICROALBUMIN Q1  01/02/2020    GLAUCOMA SCREENING Q2Y  04/05/2020       JOHNNY Ruiz   2/5/2020  12:48 PM

## 2020-02-05 NOTE — PROGRESS NOTES
1. Have you been to the ER, urgent care clinic since your last visit? Hospitalized since your last visit? No    2. Have you seen or consulted any other health care providers outside of the [address] [place] [address] since your last visit? Include any pap smears or colon screening. Yes Reason for visit: 01- Cataract Surgery Dr. Rc Pepe 1/20/2020   Do you ever feel afraid of your partner? N   Are you in a relationship with someone who physically or mentally threatens you? N   Is it safe for you to go home? Y     Fall Risk Assessment, last 12 mths 1/20/2020   Able to walk? Yes   Fall in past 12 months?  No   Fall with injury? -   Number of falls in past 12 months -   Fall Risk Score -             3 most recent PHQ Screens 1/20/2020   Little interest or pleasure in doing things Not at all   Feeling down, depressed, irritable, or hopeless Not at all   Total Score PHQ 2 0

## 2020-02-05 NOTE — PATIENT INSTRUCTIONS
Medicare Wellness Visit, Female The best way to live healthy is to have a lifestyle where you eat a well-balanced diet, exercise regularly, limit alcohol use, and quit all forms of tobacco/nicotine, if applicable. Regular preventive services are another way to keep healthy. Preventive services (vaccines, screening tests, monitoring & exams) can help personalize your care plan, which helps you manage your own care. Screening tests can find health problems at the earliest stages, when they are easiest to treat. [facility] follows the current, evidence-based guidelines published by the [facility] ([facility]) when recommending preventive services for our patients. Because we follow these guidelines, sometimes recommendations change over time as research supports it. (For example, mammograms used to be recommended annually. Even though Medicare will still pay for an annual mammogram, the newer guidelines recommend a mammogram every two years for women of average risk). Of course, you and your doctor may decide to screen more often for some diseases, based on your risk and your co-morbidities (chronic disease you are already diagnosed with). Preventive services for you include: - Medicare offers their members a free annual wellness visit, which is time for you and your primary care provider to discuss and plan for your preventive service needs. Take advantage of this benefit every year! 
-All adults over the age of 72 should receive the recommended pneumonia vaccines. Current USPSTF guidelines recommend a series of two vaccines for the best pneumonia protection.  
-All adults should have a flu vaccine yearly and a tetanus vaccine every 10 years.  
-All adults age 48 and older should receive the shingles vaccines (series of two vaccines). -All adults age 38-68 who are overweight should have a diabetes screening test once every three years. -All adults born between [birth year] and 1966 should be screened once for Hepatitis C. 
-Other screening tests and preventive services for persons with diabetes include: an eye exam to screen for diabetic retinopathy, a kidney function test, a foot exam, and stricter control over your cholesterol.  
-Cardiovascular screening for adults with routine risk involves an electrocardiogram (ECG) at intervals determined by your doctor.  
-Colorectal cancer screenings should be done for adults age 54-65 with no increased risk factors for colorectal cancer. There are a number of acceptable methods of screening for this type of cancer. Each test has its own benefits and drawbacks. Discuss with your doctor what is most appropriate for you during your annual wellness visit. The different tests include: colonoscopy (considered the best screening method), a fecal occult blood test, a fecal DNA test, and sigmoidoscopy. 
 
-A bone mass density test is recommended when a woman turns 65 to screen for osteoporosis. This test is only recommended one time, as a screening. Some providers will use this same test as a disease monitoring tool if you already have osteoporosis. -Breast cancer screenings are recommended every other year for women of normal risk, age 54-69. 
-Cervical cancer screenings for women over age 72 are only recommended with certain risk factors. Here is a list of your current Health Maintenance items (your personalized list of preventive services) with a due date: 
Health Maintenance Due Topic Date Due  
 DTaP/Tdap/Td  (1 - Tdap) 02/15/1955  Shingles Vaccine (1 of 2) 02/15/1994  Diabetic Foot Care  02/02/2018 [facility]  04/05/2019  Flu Vaccine  08/01/2019  Bone Densitometry  11/14/2019  Albumin Urine Test  01/02/2020  Glaucoma Screening   04/05/2020

## 2020-02-05 NOTE — PROGRESS NOTES
Patient: Gera Adame MRN: [id number]  SSN: [id number]    YOB: 1945  Age: 75 y.o. Sex: female      Date of Service: 2/5/2020   Provider: JOHNNY Guerra   Office Location:   [facility] [address] [place], [address]  Office Phone: [phone]  Office Fax: [phone]      REASON FOR VISIT:   Chief Complaint   Patient presents with    Annual Wellness Visit    Diabetes    Hypertension    Osteoporosis        VITALS:   Visit Vitals  /70 (BP 1 Location: Right arm, BP Patient Position: Sitting)   Pulse 80   Temp 98.8 °F (37.1 °C) (Oral)   Resp 16   Ht 5' 4\" (1.626 m)   Wt 123 lb (55.8 kg)   SpO2 99%   BMI 21.11 kg/m²        MEDICATIONS:   Current Outpatient Medications on File Prior to Visit   Medication Sig Dispense Refill    simvastatin (ZOCOR) 20 mg tablet TAKE 1 TABLET EVERY NIGHT 90 Tab 0    cholecalciferol (VITAMIN D3) 2,000 unit cap capsule Take 2,000 Units by mouth daily. 30 Cap 5    VIT C/E/ZN/COPPR/LUTEIN/ZEAXAN (PRESERVISION AREDS 2 PO) Take  by mouth.  COQ10, UBIQUINOL, PO Take  by mouth.  Calcium-Cholecalciferol, D3, (CALCIUM 500 + D) 500 mg(1,250mg) -400 unit tab Take 1 Tab by mouth two (2) times a day. 180 Tab 4    denosumab (PROLIA) 60 mg/mL injection 1 mL by SubCUTAneous route every 6 months. To be done at [facility].  1 Syringe 1    lancets 33 gauge misc TEST BLOOD SUGAR AS DIRECTED 300 Lancet 0    [DISCONTINUED] metFORMIN (GLUCOPHAGE) 1,000 mg tablet TAKE 1 TABLET TWICE DAILY WITH MEALS 180 Tab 0    [DISCONTINUED] losartan (COZAAR) 25 mg tablet TAKE 1 TABLET EVERY DAY 90 Tab 0    [DISCONTINUED] glipiZIDE (GLUCOTROL) 5 mg tablet TAKE 1 TABLET EVERY DAY 90 Tab 0    [DISCONTINUED] glucose blood VI test strips (TRUE METRIX GLUCOSE TEST STRIP) strip USE AS DIRECTED 100 Strip 0    Blood-Glucose Meter (TRUE METRIX AIR GLUCOSE METER) monitoring kit Dx: E11.9 1 Kit 0 No current facility-administered medications on file prior to visit. ALLERGIES:   Allergies   Allergen Reactions    Aspralum [Aspirin, Buffered] Other (comments)     Causes abdominal cramping        MEDICAL/SURGICAL HISTORY:  Past Medical History:   Diagnosis Date    Cataract 04/2017    bilateral     Diabetes [facility] [address] [address]     Diabetic retinopathy of both eyes ([facility] [address] 04/2017    Hypertension     Macular degeneration 9/2015    Osteoporosis 09/2015    on Prolia starting 2/2017      Past Surgical History:   Procedure Laterality Date    COLONOSCOPY N/A 7/19/2017    COLONOSCOPY performed by Catarina Jung MD at [address] [facility] [address]  12/2016    right eye        FAMILY HISTORY:  Family History   Problem Relation Age of Onset    Heart Disease Mother     Heart Disease Father     Other Brother         brain aneurysm        SOCIAL HISTORY:  Social History     Tobacco Use    Smoking status: Never Smoker    Smokeless tobacco: Never Used   Substance Use Topics    Alcohol use: No    Drug use: No             HISTORY OF PRESENT ILLNESS: Deysi Gannon is a 75 y.o. female who presents to the office for a routine follow up visit. Diabetes -  Currently the patient's condition is stable, well controlled.   Last A1c: 6.0% on 8/5/19  Today's A1c: 5.9%  Patient reports compliance with the following: metformin 1,000 mg BID, glipizide 5 mg daily     Last urine microalbumin: 1/2/19, mild microalbuminuria noted. Patient is on ARB therapy.    Last lipid panel: 8/2/19, LDL 77 at that time   Last foot exam: 2/2/17  Last eye exam: UTD. Follows closely with Dr. Marcial Dao for DM retinopathy with macular edema     Hypertension -    Currently, the patient's condition is poorly controlled.    She is prescribed losartan 25 mg daily, but has been out of her meds x about 2 mo.   Home BP readings: not checking      Osteoporosis -   Diagnosed in 2015 based on screening DEXA  Patient was originally treated with Fosamax, but was unable to tolerate, and was subsequently switched to Prolia infusions q 6 mo. However, Prolia seemed to cause side effects of diffuse joint aches, and patient also became concerned over a class action lawsuit she had heard about regarding this particular medication. She has subsequently been off meds for about a year, but has remained compliant with her calcium + D supplement. She now expresses interest in trying Reclast, after talking to some friends who have had improved BMD while on this medication. She denies any recent falls        REVIEW OF SYSTEMS:  Review of Systems   Constitutional: Negative for chills, fever and malaise/fatigue. Respiratory: Negative for cough, shortness of breath and wheezing. Cardiovascular: Negative for chest pain, palpitations and leg swelling. PHYSICAL EXAMINATION:  Physical Exam  Cardiovascular:      Rate and Rhythm: Normal rate and regular rhythm. Heart sounds: Normal heart sounds. No murmur. No friction rub. No gallop. Pulmonary:      Effort: Pulmonary effort is normal.      Breath sounds: Normal breath sounds. No wheezing or rales. Skin:     General: Skin is warm and dry. Findings: No rash. Neurological:      Mental Status: She is alert and oriented to person, place, and time. Gait: Gait is intact. Psychiatric:         Mood and Affect: Mood and affect normal.         Cognition and Memory: Memory normal.          RESULTS:  No results found for this visit on 02/05/20. ASSESSMENT/PLAN:  Diagnoses and all orders for this visit:    1.  Type 2 diabetes mellitus with microalbuminuria, without long-term current use of insulin (HCC)  - Well controlled  - Continue current medications  - Continue compliance with diet  - Continue regular foot & eye care  - Update full set of fasting labs prior to next visit  Orders:    -     metFORMIN (GLUCOPHAGE) 1,000 mg tablet; TAKE 1 TABLET TWICE DAILY WITH MEALS  -     losartan (COZAAR) 25 mg tablet; Take 1 tab PO daily  -     glipiZIDE (GLUCOTROL) 5 mg tablet; Take 1 tab PO BID  -     glucose blood VI test strips (TRUE METRIX GLUCOSE TEST STRIP) strip; Test blood sugar 1 hour prior to breakfast and 1 hour after dinner DX Z34.7  -     METABOLIC PANEL, COMPREHENSIVE; Future  -     LIPID PANEL; Future  -     HEMOGLOBIN A1C W/O EAG; Future  -     MICROALBUMIN, UR, RAND W/ MICROALB/CREAT RATIO; Future  -     AMB POC HEMOGLOBIN A1C    2. Proliferative diabetic retinopathy of both eyes associated with type 2 diabetes mellitus, unspecified proliferative retinopathy type ([address]  3. Diabetic macular edema ([address]  - Keep regular follow up with ophthalmology    4. Essential hypertension  Orders:    -     losartan (COZAAR) 25 mg tablet; Take 1 tab PO daily    5. Age-related osteoporosis without current pathological fracture  - Will look into Reclast and see if this can be ordered through our outpatient infusion center. If not, she is open-minded to seeing a specialist.        Follow-up and Dispositions    · Return in about 6 months (around 8/5/2020) for routine care and fasting labs to be done 3-5 days prior . All questions answered. Patient expresses understanding and agrees with the plan as detailed above.     PATIENT CARE TEAM:   Patient Care Team:  JOHNNY Lucia as PCP - General (Physician Assistant)  JOHNNY Lucia as PCP [facility]  Elodia Price MD (Ophthalmology)  Regan Parham MD (Ophthalmology)       JOHNNY Amos   February 5, 2020   12:50 PM

## 2020-02-11 ENCOUNTER — HOSPITAL ENCOUNTER (OUTPATIENT)
Dept: BONE DENSITY | Age: 75
Discharge: HOME | End: 2020-02-11
Attending: PHYSICIAN ASSISTANT
Payer: MEDICARE

## 2020-02-11 ENCOUNTER — HOSPITAL ENCOUNTER (OUTPATIENT)
Dept: MAMMOGRAPHY | Age: 75
Discharge: HOME | End: 2020-02-11
Attending: PHYSICIAN ASSISTANT
Payer: MEDICARE

## 2020-02-11 DIAGNOSIS — Z12.31 ENCOUNTER FOR SCREENING MAMMOGRAM FOR BREAST CANCER: ICD-10-CM

## 2020-02-11 DIAGNOSIS — M81.0 OSTEOPOROSIS, UNSPECIFIED OSTEOPOROSIS TYPE, UNSPECIFIED PATHOLOGICAL FRACTURE PRESENCE: ICD-10-CM

## 2020-02-11 PROCEDURE — 77063 BREAST TOMOSYNTHESIS BI: CPT

## 2020-02-11 PROCEDURE — 77080 DXA BONE DENSITY AXIAL: CPT

## 2020-03-31 DIAGNOSIS — E11.29 TYPE 2 DIABETES MELLITUS WITH MICROALBUMINURIA, WITHOUT LONG-TERM CURRENT USE OF INSULIN (HCC): ICD-10-CM

## 2020-03-31 DIAGNOSIS — R80.9 TYPE 2 DIABETES MELLITUS WITH MICROALBUMINURIA, WITHOUT LONG-TERM CURRENT USE OF INSULIN (HCC): ICD-10-CM

## 2020-03-31 RX ORDER — GLIPIZIDE 5 MG/1
TABLET ORAL
Qty: 90 TAB | Refills: 1 | Status: SHIPPED | OUTPATIENT
Start: 2020-03-31 | End: 2020-04-15 | Stop reason: SDUPTHER

## 2020-03-31 NOTE — TELEPHONE ENCOUNTER
This pharmacy faxed over request for the following prescriptions to be filled:    Medication requested :   Requested Prescriptions     Pending Prescriptions Disp Refills    glipiZIDE (GLUCOTROL) 5 mg tablet 90 Tab 1     Sig: Take 1 tab PO BID     PCP: Quentin or Print: Driss  Mail order or Local pharmacy: [phone]    Scheduled appointment if not seen by current providers in office: LOV:2-5-20 NOV:8-5-20

## 2020-04-15 DIAGNOSIS — I10 ESSENTIAL HYPERTENSION: ICD-10-CM

## 2020-04-15 DIAGNOSIS — R80.9 TYPE 2 DIABETES MELLITUS WITH MICROALBUMINURIA, WITHOUT LONG-TERM CURRENT USE OF INSULIN (HCC): ICD-10-CM

## 2020-04-15 DIAGNOSIS — E11.29 TYPE 2 DIABETES MELLITUS WITH MICROALBUMINURIA, WITHOUT LONG-TERM CURRENT USE OF INSULIN (HCC): ICD-10-CM

## 2020-04-15 RX ORDER — GLIPIZIDE 5 MG/1
TABLET ORAL
Qty: 90 TAB | Refills: 1 | Status: SHIPPED | OUTPATIENT
Start: 2020-04-15 | End: 2020-05-14

## 2020-04-15 RX ORDER — METFORMIN HYDROCHLORIDE 1000 MG/1
TABLET ORAL
Qty: 180 TAB | Refills: 1 | Status: SHIPPED | OUTPATIENT
Start: 2020-04-15 | End: 2020-10-26 | Stop reason: SDUPTHER

## 2020-04-15 RX ORDER — LOSARTAN POTASSIUM 25 MG/1
TABLET ORAL
Qty: 90 TAB | Refills: 1 | Status: SHIPPED | OUTPATIENT
Start: 2020-04-15 | End: 2020-08-21 | Stop reason: SDUPTHER

## 2020-04-15 NOTE — TELEPHONE ENCOUNTER
LOV 02/05/2020   F/U 08/05/2020  Needs prescriptions to go to new mail order.    Fax # [phone] / [facility]

## 2020-05-14 DIAGNOSIS — E11.29 TYPE 2 DIABETES MELLITUS WITH MICROALBUMINURIA, WITHOUT LONG-TERM CURRENT USE OF INSULIN (HCC): ICD-10-CM

## 2020-05-14 DIAGNOSIS — R80.9 TYPE 2 DIABETES MELLITUS WITH MICROALBUMINURIA, WITHOUT LONG-TERM CURRENT USE OF INSULIN (HCC): ICD-10-CM

## 2020-05-14 RX ORDER — GLIPIZIDE 5 MG/1
TABLET ORAL
Qty: 180 TAB | Refills: 0 | Status: SHIPPED | OUTPATIENT
Start: 2020-05-14 | End: 2020-09-28 | Stop reason: SDUPTHER

## 2020-06-30 ENCOUNTER — TELEPHONE (OUTPATIENT)
Dept: OTHER | Age: 75
End: 2020-06-30

## 2020-06-30 NOTE — TELEPHONE ENCOUNTER
This writer called patient related to medication adherence, patient did not answer. This writer left a voicemail and asked for patient to return call at earliest convenience.

## 2020-08-21 DIAGNOSIS — R80.9 TYPE 2 DIABETES MELLITUS WITH MICROALBUMINURIA, WITHOUT LONG-TERM CURRENT USE OF INSULIN (HCC): ICD-10-CM

## 2020-08-21 DIAGNOSIS — I10 ESSENTIAL HYPERTENSION: ICD-10-CM

## 2020-08-21 DIAGNOSIS — E11.29 TYPE 2 DIABETES MELLITUS WITH MICROALBUMINURIA, WITHOUT LONG-TERM CURRENT USE OF INSULIN (HCC): ICD-10-CM

## 2020-08-21 RX ORDER — LOSARTAN POTASSIUM 25 MG/1
TABLET ORAL
Qty: 90 TAB | Refills: 0 | Status: SHIPPED | OUTPATIENT
Start: 2020-08-21 | End: 2020-11-23

## 2020-08-21 NOTE — TELEPHONE ENCOUNTER
This pharmacy faxed over request for the following prescriptions to be filled:    Medication requested :   Requested Prescriptions     Pending Prescriptions Disp Refills    losartan (COZAAR) 25 mg tablet 90 Tab 1     Sig: Take 1 tab PO daily     PCP: Quentin or Print:  Driss  Mail order or Local pharmacy [address]     Scheduled appointment if not seen by current providers in office:  LOV 2/5/2020 f/u 9/8/2020 Matthew Lundberg

## 2020-09-28 DIAGNOSIS — E11.29 TYPE 2 DIABETES MELLITUS WITH MICROALBUMINURIA, WITHOUT LONG-TERM CURRENT USE OF INSULIN (HCC): ICD-10-CM

## 2020-09-28 DIAGNOSIS — R80.9 TYPE 2 DIABETES MELLITUS WITH MICROALBUMINURIA, WITHOUT LONG-TERM CURRENT USE OF INSULIN (HCC): ICD-10-CM

## 2020-09-28 RX ORDER — GLIPIZIDE 5 MG/1
TABLET ORAL
Qty: 180 TAB | Refills: 0 | Status: SHIPPED | OUTPATIENT
Start: 2020-09-28 | End: 2020-12-07 | Stop reason: SDUPTHER

## 2020-09-28 NOTE — TELEPHONE ENCOUNTER
This pharmacy faxed over request for the following prescriptions to be filled:    Medication requested :   Requested Prescriptions     Pending Prescriptions Disp Refills    glipiZIDE (GLUCOTROL) 5 mg tablet 180 Tab 0     Sig: TAKE ONE TABLET BY MOUTH TWICE A DAY     PCP: Quentin or Print: Driss  Mail order or Local pharmacy [address]     Scheduled appointment if not seen by current providers in office:  LOV 2/5/2020 f/u 10/2/2020 Dylon Sahu

## 2020-10-01 ENCOUNTER — TELEPHONE (OUTPATIENT)
Dept: FAMILY MEDICINE CLINIC | Age: 75
End: 2020-10-01

## 2020-10-01 NOTE — TELEPHONE ENCOUNTER
Have you been diagnosed with, tested for, or told that you are suspected of having COVID-19 (coronovirus)? Have you had a fever or taken medication to treat a fever in the past 72 hours? Have you had a cough, SOB, or flu-like symptoms within the past 3 days? Have you had direct contact with someone who tested positive for COVID-19 within the past 14 days? Do you have a household member with flu-like symptoms, including fever, cough, or SOB? Do you currently have flu-like symptoms including fever, cough, or SOB? Are you experiencing new loss of taste or smell?         ALL ANSWERS TO THE ABOVE QUESTIONS IS NO

## 2020-10-02 ENCOUNTER — OFFICE VISIT (OUTPATIENT)
Dept: FAMILY MEDICINE CLINIC | Age: 75
End: 2020-10-02
Payer: MEDICARE

## 2020-10-02 VITALS
TEMPERATURE: 98.8 F | SYSTOLIC BLOOD PRESSURE: 108 MMHG | WEIGHT: 120 LBS | RESPIRATION RATE: 16 BRPM | HEART RATE: 75 BPM | BODY MASS INDEX: 20.49 KG/M2 | DIASTOLIC BLOOD PRESSURE: 72 MMHG | OXYGEN SATURATION: 99 % | HEIGHT: 64 IN

## 2020-10-02 DIAGNOSIS — E78.1 HYPERTRIGLYCERIDEMIA: Primary | ICD-10-CM

## 2020-10-02 DIAGNOSIS — R07.9 CHEST PAIN, UNSPECIFIED TYPE: ICD-10-CM

## 2020-10-02 DIAGNOSIS — E11.9 WELL CONTROLLED DIABETES MELLITUS (HCC): ICD-10-CM

## 2020-10-02 DIAGNOSIS — M65.331 TRIGGER FINGER, RIGHT MIDDLE FINGER: ICD-10-CM

## 2020-10-02 DIAGNOSIS — Z23 ENCOUNTER FOR IMMUNIZATION: ICD-10-CM

## 2020-10-02 PROBLEM — E11.3599 TYPE 2 DIABETES MELLITUS WITH PROLIFERATIVE RETINOPATHY (HCC): Status: ACTIVE | Noted: 2020-10-02

## 2020-10-02 PROCEDURE — G8427 DOCREV CUR MEDS BY ELIG CLIN: HCPCS | Performed by: FAMILY MEDICINE

## 2020-10-02 PROCEDURE — 1090F PRES/ABSN URINE INCON ASSESS: CPT | Performed by: FAMILY MEDICINE

## 2020-10-02 PROCEDURE — 99214 OFFICE O/P EST MOD 30 MIN: CPT | Performed by: FAMILY MEDICINE

## 2020-10-02 PROCEDURE — G8510 SCR DEP NEG, NO PLAN REQD: HCPCS | Performed by: FAMILY MEDICINE

## 2020-10-02 PROCEDURE — G8754 DIAS BP LESS 90: HCPCS | Performed by: FAMILY MEDICINE

## 2020-10-02 PROCEDURE — G8536 NO DOC ELDER MAL SCRN: HCPCS | Performed by: FAMILY MEDICINE

## 2020-10-02 PROCEDURE — G8420 CALC BMI NORM PARAMETERS: HCPCS | Performed by: FAMILY MEDICINE

## 2020-10-02 PROCEDURE — 90662 IIV NO PRSV INCREASED AG IM: CPT | Performed by: FAMILY MEDICINE

## 2020-10-02 PROCEDURE — G0008 ADMIN INFLUENZA VIRUS VAC: HCPCS | Performed by: FAMILY MEDICINE

## 2020-10-02 PROCEDURE — 1101F PT FALLS ASSESS-DOCD LE1/YR: CPT | Performed by: FAMILY MEDICINE

## 2020-10-02 PROCEDURE — G8752 SYS BP LESS 140: HCPCS | Performed by: FAMILY MEDICINE

## 2020-10-02 RX ORDER — ATORVASTATIN CALCIUM 20 MG/1
20 TABLET, FILM COATED ORAL
Qty: 90 TAB | Refills: 1 | Status: SHIPPED | OUTPATIENT
Start: 2020-10-02 | End: 2020-12-07 | Stop reason: SDUPTHER

## 2020-10-02 NOTE — PROGRESS NOTES
Patient presents for the following vaccines: FLUAD. Patient consent obtained by patient. Patient tolerated procedure well at left deltoid. Patient remained in exam room, during vaccine documentation, for period of 10 minutes post injection to ensure no reaction. VIS was given to patient.     Lot # [id number]  Exp: 6/15/2021  MFG: Elizabeth Brown.  [address]: [phone]

## 2020-10-02 NOTE — PROGRESS NOTES
1. Have you been to the ER, urgent care clinic since your last visit? Hospitalized since your last visit? No    2. Have you seen or consulted any other health care providers outside of the [address] [place] [address] since your last visit? Include any pap smears or colon screening.  No       Have you been diagnosed with, tested for, or told that you are suspected of having COVID-19 (coronovirus)? No  Have you had a fever or taken medication to treat a fever in the past 72 hours? No  Have you had a cough, SOB, or flu-like symptoms within the past 3 days? No  Have you had direct contact with someone who tested positive for COVID-19 within the past 14 days? No  Do you have a household member with flu-like symptoms, including fever, cough, or SOB? No  Do you currently have flu-like symptoms including fever, cough, or SOB? No  Are you experiencing new loss of taste or smell? No

## 2020-10-02 NOTE — PATIENT INSTRUCTIONS
Learning About Low-Fat Eating What is low-fat eating? Most food has some fat in it. Your body needs some fat to be healthy. But some kinds of fats are healthier than others. In a low-fat eating plan, you try to choose healthier fats and eat fewer unhealthy fats. Healthy fats include olive and canola oil. Try to avoid eating too much saturated fat (such as in cheese and meats) and trans fat (a type of fat found in many packaged snack foods and other baked goods). You do not need to cut all fat from your diet. But you can make healthier choices about the types and amount of fat you eat. Even though it is a good idea to choose healthier fats, it is still important to be careful of how much fat you eat, because all fats are high in calories. What are the different types of fats? Unhealthy fat · Saturated fat. Saturated fats are mostly in animal foods, such as meat and dairy foods. Tropical oils, such as coconut oil, palm oil, and cocoa butter, are also saturated fats. Healthy fats · Monounsaturated fat. Monounsaturated fats are liquid at room temperature but get solid when refrigerated. Eating foods that are high in this fat may help lower your \"bad\" (LDL) cholesterol, keep your \"good\" (HDL) cholesterol level up, and lower your chances of getting coronary artery disease. This fat is found in canola oil, olive oil, peanut oil, olives, avocados, nuts, and nut butters. · Polyunsaturated fat. Polyunsaturated fats are liquid at room temperature. They are in safflower, sunflower, and corn oils. They are also the main fat in seafood. Omega-3 fatty acids are types of polyunsaturated fat. Eating fish may lower your chances of getting coronary artery disease. Fatty fish such as salmon and mackerel contain these healthy fatty acids. So do ground flaxseeds and flaxseed oil, soybeans, walnuts, and seeds. Why cut down on unhealthy fats?  
Eating foods that contain saturated fats can raise the LDL (\"bad\") cholesterol in your blood. Having a high level of LDL cholesterol increases your chance of hardening of the arteries (atherosclerosis), which can lead to heart disease, heart attack, and stroke. Trans fat raises the level of \"bad\" LDL cholesterol in your blood and may lower the \"good\" HDL cholesterol in your blood. Trans fat can raise your risk of heart disease, heart attack, and stroke. In general: · No more than 10% of your daily calories should come from saturated fat. This is about 20 grams in a 2,000-calorie diet. · No more than 10% of your daily calories should come from polyunsaturated fat. This is about 20 grams in a 2,000-calorie diet. · Monounsaturated fats can be up to 15% of your daily calories. This is about 25 to 30 grams in a 2,000-calorie diet. If you're not sure how much fat you should be eating or how many calories you need each day to stay at a healthy weight, talk to a registered dietitian. He or she can help you create a plan that's right for you. What can you do to cut down on fat? Foods like cheese, butter, sausage, and desserts can have a lot of unhealthy fats. Try these tips for healthier meals at home and when you eat out. At home · Fill up on fruits, vegetables, and whole grains. · Think of meat as a side dish instead of as the main part of your meal. 
· When you do eat meat, make it extra-lean ground beef (97% lean), ground turkey breast (without skin added), meats with fat trimmed off before cooking, or skinless chicken. · Try main dishes that use whole wheat pasta, brown rice, dried beans, or vegetables. · Use cooking methods that use little or no fat, such as broiling, steaming, or grilling. Use cooking spray instead of oil. If you use oil, use a monounsaturated oil, such as canola or olive oil. · Read food labels on canned, bottled, or packaged foods. Choose those with little saturated fat and no trans fat. When eating out at a restaurant · Order foods that are broiled or poached instead of fried or breaded. · Cut back on the amount of butter or margarine that you use on bread. Use small amounts of olive oil instead. · Order sauces, gravies, and salad dressings on the side, and use only a little. · When you order pasta, choose tomato sauce instead of cream sauce. · Ask for salsa with your baked potato instead of sour cream, butter, cheese, or hodges. Where can you learn more? Go to http://www.gray.com/ Enter Q612 in the search box to learn more about \"Learning About Low-Fat Eating. \" Current as of: August 22, 2019               Content Version: 12.6 © 1792-2847 [employer]. Care instructions adapted under license by [employer] (which disclaims liability or warranty for this information). If you have questions about a medical condition or this instruction, always ask your healthcare professional. [place] [address] any warranty or liability for your use of this information. Nutrition Tips for Diabetes: After Your Visit Your Care Instructions A healthy diet is important to manage diabetes. It helps you lose weight (if you need to) and keep it off. It gives you the nutrition and energy your body needs and helps prevent heart disease. But a diet for diabetes does not mean that you have to eat special foods. You can eat what your family eats, including occasional sweets and other favorites. But you do have to pay attention to how often you eat and how much you eat of certain foods. The right plan for you will give you meals that help you keep your blood sugar at healthy levels. Try to eat a variety of foods and to spread carbohydrate throughout the day. Carbohydrate raises blood sugar higher and more quickly than any other nutrient does. Carbohydrate is found in sugar, breads and cereals, fruit, starchy vegetables such as potatoes and corn, and milk and yogurt. You may want to work with a dietitian or diabetes educator to help you plan meals and snacks. A dietitian or diabetes educator also can help you lose weight if that is one of your goals. The following tips can help you enjoy your meals and stay healthy. Follow-up care is a key part of your treatment and safety. Be sure to make and go to all appointments, and call your doctor if you are having problems. Its also a good idea to know your test results and keep a list of the medicines you take. How can you care for yourself at home? · Learn which foods have carbohydrate and how much carbohydrate to eat. A dietitian or diabetes educator can help you learn to keep track of how much carbohydrate you eat. · Spread carbohydrate throughout the day. Eat some carbohydrate at all meals, but do not eat too much at any one time. · Plan meals to include food from all the food groups. These are the food groups and some example portion sizes: ¨ Grains: 1 slice of bread (1 ounce), ½ cup of cooked cereal, and 1/3 cup of cooked pasta or rice. These have about 15 grams of carbohydrate in a serving. Choose whole grains such as whole wheat bread or crackers, oatmeal, and brown rice more often than refined grains. ¨ Fruit: 1 small fresh fruit, such as an apple or orange; ½ of a banana; ½ cup of chopped, cooked, or canned fruit; ½ cup of fruit juice; 1 cup of melon or raspberries; and 2 tablespoons of dried fruit. These have about 15 grams of carbohydrate in a serving. ¨ Dairy: 1 cup of nonfat or low-fat milk and 2/3 cup of plain yogurt. These have about 15 grams of carbohydrate in a serving. ¨ Protein foods: Beef, chicken, turkey, fish, eggs, tofu, cheese, cottage cheese, and peanut butter. A serving size of meat is 3 ounces, which is about the size of a deck of cards.  Examples of meat substitute serving sizes (equal to 1 ounce of meat) are 1/4 cup of cottage cheese, 1 egg, 1 tablespoon of peanut butter, and ½ cup of tofu. These have very little or no carbohydrate per serving. ¨ Vegetables: Starchy vegetables such as ½ cup of cooked dried beans, peas, potatoes, or corn have about 15 grams of carbohydrate. Nonstarchy vegetables have very little carbohydrate, such as 1 cup of raw leafy vegetables (such as spinach), ½ cup of other vegetables (cooked or chopped), and 3/4 cup of vegetable juice. · Use the plate format to plan meals. It is a good, quick way to make sure that you have a balanced meal. It also helps you spread carbohydrate throughout the day. You divide your plate by types of foods. Put vegetables on half the plate, meat or meat substitutes on one-quarter of the plate, and a grain or starchy vegetable (such as brown rice or a potato) in the final quarter of the plate. To this you can add a small piece of fruit and 1 cup of milk or yogurt, depending on how much carbohydrate you are supposed to eat at a meal. 
· Talk to your dietitian or diabetes educator about ways to add limited amounts of sweets into your meal plan. You can eat these foods now and then, as long as you include the amount of carbohydrate they have in your daily carbohydrate allowance. · If you drink alcohol, limit it to no more than 1 drink a day for women and 2 drinks a day for men. If you are pregnant, no amount of alcohol is known to be safe. · Protein, fat, and fiber do not raise blood sugar as much as carbohydrate does. If you eat a lot of these nutrients in a meal, your blood sugar will rise more slowly than it would otherwise. · Limit saturated fats, such as those from meat and dairy products. Try to replace it with monounsaturated fat, such as olive oil. This is a healthier choice because people who have diabetes are at higher-than-average risk of heart disease. But use a modest amount of olive oil. A tablespoon of olive oil has 14 grams of fat and 120 calories. · Exercise lowers blood sugar. If you take insulin by shots or pump, you can use less than you would if you were not exercising. Keep in mind that timing matters. If you exercise within 1 hour after a meal, your body may need less insulin for that meal than it would if you exercised 3 hours after the meal. Test your blood sugar to find out how exercise affects your need for insulin. · Exercise on most days of the week. Aim for at least 30 minutes. Exercise helps you stay at a healthy weight and helps your body use insulin. Walking is an easy way to get exercise. Gradually increase the amount you walk every day. You also may want to swim, bike, or do other activities. When you eat out · Learn to estimate the serving sizes of foods that have carbohydrate. If you measure food at home, it will be easier to estimate the amount in a serving of restaurant food. · If the meal you order has too much carbohydrate (such as potatoes, corn, or baked beans), ask to have a low-carbohydrate food instead. Ask for a salad or green vegetables. · If you use insulin, check your blood sugar before and after eating out to help you plan how much to eat in the future. · If you eat more carbohydrate at a meal than you had planned, take a walk or do other exercise. This will help lower your blood sugar. Where can you learn more? Go to [employer] Enter W003 in the search box to learn more about \"Nutrition Tips for Diabetes: After Your Visit. \"  
© 6848-4745 [employer]. Care instructions adapted under license by [employer] (which disclaims liability or warranty for this information). This care instruction is for use with your licensed healthcare professional. If you have questions about a medical condition or this instruction, always ask your healthcare professional. [address] any warranty or liability for your use of this information. Content Version: 55.5.686720; Current as of: June 4, 2014

## 2020-10-02 NOTE — PROGRESS NOTES
HISTORY OF PRESENT ILLNESS  Rosalinda Marshall is a 67 y.o. female. HPI: [place] patient. Transfer to me. New patient to me. History of hypertension. Fairly stable. Asymptomatic. Taking medication with compliance and no side effects. History of diabetes. Last A1c in prediabetic range. Taking medication with compliance. Stated her glipizide she used to take once a day but now she has been taking twice a day which was just recently filled the weight by Dr. Tania Drake. No hypoglycemic symptoms. Checking blood sugar and that stays under 120. During the office visit she is sitting comfortable and did not appear in any acute distress history of hyperlipidemia. Mainly hypertriglyceridemia. She is on statin. No side effects. Taking medication with compliance. Discussed the importance of the diet modification and lifestyle modification. Complaining of trigger right middle finger. On and off. Pain when it occurs. Agreed to see the hand surgeon. Currently noted no swelling or redness or any other  hand joint pains. Visit Vitals  /72 (BP 1 Location: Right arm, BP Patient Position: Sitting)   Pulse 75   Temp 98.8 °F (37.1 °C) (Oral)   Resp 16   Ht 5' 4\" (1.626 m)   Wt 120 lb (54.4 kg)   SpO2 99%   BMI 20.60 kg/m²     Patient Active Problem List    Diagnosis Date Noted    Type 2 diabetes mellitus with proliferative retinopathy ([facility] 10/02/2020    Diabetic macular edema ([facility] 10/03/2018    Diabetic retinopathy of both eyes ([facility] 04/01/2017    Type II diabetes mellitus ([facility] 09/29/2015    Macular degeneration 09/29/2015    Osteoporosis 09/17/2015    Essential hypertension 07/13/2015     Current Outpatient Medications   Medication Sig Dispense Refill    atorvastatin (LIPITOR) 20 mg tablet Take 1 Tab by mouth nightly.  90 Tab 1    glipiZIDE (GLUCOTROL) 5 mg tablet TAKE ONE TABLET BY MOUTH TWICE A  Tab 0    losartan (COZAAR) 25 mg tablet Take 1 tab PO daily 90 Tab 0    metFORMIN (GLUCOPHAGE) 1,000 mg tablet TAKE 1 TABLET TWICE DAILY WITH MEALS 180 Tab 1    cholecalciferol (VITAMIN D3) 2,000 unit cap capsule Take 2,000 Units by mouth daily. 30 Cap 5    VIT C/E/ZN/COPPR/LUTEIN/ZEAXAN (PRESERVISION AREDS 2 PO) Take  by mouth.  COQ10, UBIQUINOL, PO Take  by mouth.  Calcium-Cholecalciferol, D3, (CALCIUM 500 + D) 500 mg(1,250mg) -400 unit tab Take 1 Tab by mouth two (2) times a day. 180 Tab 4    glucose blood VI test strips (TRUE METRIX GLUCOSE TEST STRIP) strip Test blood sugar 1 hour prior to breakfast and 1 hour after dinner DX E11.9 100 Strip 0    lancets 33 gauge misc TEST BLOOD SUGAR AS DIRECTED 300 Lancet 0    Blood-Glucose Meter (TRUE METRIX AIR GLUCOSE METER) monitoring kit Dx: E11.9 1 Kit 0    denosumab (PROLIA) 60 mg/mL injection 1 mL by SubCUTAneous route every 6 months. To be done at [facility]. 1 Syringe 1     Allergies   Allergen Reactions    Aspralum [Aspirin, Buffered] Other (comments)     Causes abdominal cramping     Past Medical History:   Diagnosis Date    Cataract 04/2017    bilateral     Diabetes ([address]     Diabetic retinopathy of both eyes ([address] 04/2017    Hypertension     Macular degeneration 9/2015    Osteoporosis 09/2015    on Prolia starting 2/2017     Past Surgical History:   Procedure Laterality Date    COLONOSCOPY N/A 7/19/2017    COLONOSCOPY performed by Elenita Singh MD at [address] [place] [address]  12/2016    right eye     Family History   Problem Relation Age of Onset    Heart Disease Mother     Heart Disease Father     Other Brother         brain aneurysm     Social History     Tobacco Use    Smoking status: Never Smoker    Smokeless tobacco: Never Used   Substance Use Topics    Alcohol use: No        Denies any headache, dizziness, no chest pain or trouble breathing, no arm or leg weakness. No nausea or vomiting, no weight or appetite changes, no mood changes .  No urine or bowel complains, no palpitation, no diaphoresis. No abdominal pain. No cold or cough. No leg swelling. No fever. No sleep trouble. Lab Results   Component Value Date/Time    WBC 6.8 01/02/2019 10:48 AM    Hemoglobin, POC 11.9 (L) 03/02/2018 10:38 AM    HGB 12.5 01/02/2019 10:48 AM    Hematocrit, POC 35 (L) 03/02/2018 10:38 AM    HCT 37.1 01/02/2019 10:48 AM    PLATELET 628 19/43/5130 10:48 AM    MCV 85.9 01/02/2019 10:48 AM     Lab Results   Component Value Date/Time    Cholesterol, total 183 08/02/2019 12:00 AM    HDL Cholesterol 37 (L) 08/02/2019 12:00 AM    LDL, calculated 77 08/02/2019 12:00 AM    VLDL, calculated 69 (H) 08/02/2019 12:00 AM    Triglyceride 344 (H) 08/02/2019 12:00 AM    CHOL/HDL Ratio 6.1 (H) 01/02/2019 10:48 AM     Lab Results   Component Value Date/Time    TSH 3.63 02/06/2017 10:19 AM     Lab Results   Component Value Date/Time    Microalbumin/Creat ratio (mg/g creat) 24 01/02/2019 10:48 AM    Microalbumin,urine random 5.38 (H) 01/02/2019 10:48 AM    Microalbumin urine (POC) 80 09/09/2015 10:43 AM     Lab Results   Component Value Date/Time    Hemoglobin A1c 5.9 (H) 01/02/2019 10:48 AM    Hemoglobin A1c (POC) 5.9 02/05/2020 12:29 PM     Lab Results   Component Value Date/Time    Vitamin D 25-Hydroxy 27.6 (L) 01/02/2019 10:48 AM    VITAMIN D, 25-HYDROXY 56.2 08/02/2019 12:00 AM           ROS: See HPI    Physical Exam  Constitutional:       General: She is not in acute distress. Cardiovascular:      Rate and Rhythm: Normal rate and regular rhythm. Heart sounds: Normal heart sounds. Abdominal:      General: Bowel sounds are normal.      Palpations: Abdomen is soft. Tenderness: There is no abdominal tenderness. Musculoskeletal:      Comments:   No callus, no open skin area, peripheral pulsations of dorsalis pedis palpable bilaterally. Monofilament test normal.   Peripheral sensations of dorsalis pedis palpable bilaterally.         Neurological:      Mental Status: She is oriented to person, place, and time. Psychiatric:         Behavior: Behavior normal.         ASSESSMENT and PLAN    ICD-10-CM ICD-9-CM    1. Hypertriglyceridemia: On statin. Elevated triglyceride. Diet modification been discussed. We will recheck the labs E78.1 272.1 LIPID PANEL   2. Well controlled diabetes mellitus ([address]): Overdue for labs. Last A1c in prediabetic range. She been taking metformin with compliance and also mentioned that that she was taking glipizide once a day and now she was given twice a day by Dr. Liv Correa. No hypoglycemic symptoms. Advised her to do the labs and will make further adjustment on medication if needed after lab results. Discussed the diet modification. We will follow-up next visit E11.9 250.00 CBC W/O DIFF      METABOLIC PANEL, COMPREHENSIVE      LIPID PANEL      HEMOGLOBIN A1C WITH EAG      TSH 3RD GENERATION      MICROALBUMIN, UR, RAND W/ MICROALB/CREAT RATIO   3. Encounter for immunization  Z23 V03.89 INFLUENZA VIRUS VACCINE, HIGH DOSE SEASONAL, PRESERVATIVE FREE   4. Chest pain, unspecified type: Mentioning left-sided chest pain. On and off. Since few months. Last for few minutes. No associate shortness of breath, diaphoresis, nausea vomiting or palpitation. Will order the EKG. She cannot tolerate aspirin. We will observe for now R07.9 786.50 EKG, 12 LEAD, INITIAL   5. Trigger finger, right middle finger: Has been on and off trigger right middle finger. Happens often. Agreed to see the hand surgeon M65.331 727.03 REFERRAL TO HAND SURGERY   Had a flu vaccine today  Patient understood and agree with above plan  She will discuss with the pharmacist about the Tdap and the shingles vaccine as a not sure it will be covered by her insurance. Follow-up and Dispositions    · Return in about 3 months (around 1/2/2021).

## 2020-10-26 DIAGNOSIS — E11.29 TYPE 2 DIABETES MELLITUS WITH MICROALBUMINURIA, WITHOUT LONG-TERM CURRENT USE OF INSULIN (HCC): ICD-10-CM

## 2020-10-26 DIAGNOSIS — R80.9 TYPE 2 DIABETES MELLITUS WITH MICROALBUMINURIA, WITHOUT LONG-TERM CURRENT USE OF INSULIN (HCC): ICD-10-CM

## 2020-10-26 NOTE — TELEPHONE ENCOUNTER
This pharmacy faxed over request for the following prescriptions to be filled:    Medication requested :   Requested Prescriptions     Pending Prescriptions Disp Refills    metFORMIN (GLUCOPHAGE) 1,000 mg tablet 180 Tab 1     Sig: TAKE 1 TABLET TWICE DAILY WITH MEALS       PCP: Ed Gamez or Print: Driss  Mail order or Local pharmacy [address]    Scheduled appointment if not seen by current providers in office: LOV 10/2/2020 f/u 1/4/2021

## 2020-10-27 RX ORDER — METFORMIN HYDROCHLORIDE 1000 MG/1
TABLET ORAL
Qty: 180 TAB | Refills: 1 | Status: SHIPPED | OUTPATIENT
Start: 2020-10-27 | End: 2021-04-23

## 2020-11-05 ENCOUNTER — TRANSCRIBE ORDER (OUTPATIENT)
Dept: INTERNAL MEDICINE CLINIC | Age: 75
End: 2020-11-05

## 2020-11-13 ENCOUNTER — TELEPHONE (OUTPATIENT)
Dept: FAMILY MEDICINE CLINIC | Age: 75
End: 2020-11-13

## 2020-11-13 DIAGNOSIS — E11.29 TYPE 2 DIABETES MELLITUS WITH MICROALBUMINURIA, WITHOUT LONG-TERM CURRENT USE OF INSULIN (HCC): ICD-10-CM

## 2020-11-13 DIAGNOSIS — R80.9 TYPE 2 DIABETES MELLITUS WITH MICROALBUMINURIA, WITHOUT LONG-TERM CURRENT USE OF INSULIN (HCC): ICD-10-CM

## 2020-11-13 NOTE — TELEPHONE ENCOUNTER
Pt states that she needs a new glucose monitor and strips , lancets because her broke. Please advise.  letty [phone]

## 2020-11-16 RX ORDER — CALCIUM CITRATE/VITAMIN D3 200MG-6.25
TABLET ORAL
Qty: 100 STRIP | Refills: 0 | Status: SHIPPED | OUTPATIENT
Start: 2020-11-16

## 2020-11-16 RX ORDER — LANCETS 33 GAUGE
EACH MISCELLANEOUS
Qty: 300 LANCET | Refills: 0 | Status: SHIPPED | OUTPATIENT
Start: 2020-11-16

## 2020-11-16 RX ORDER — INSULIN PUMP SYRINGE, 3 ML
EACH MISCELLANEOUS
Qty: 1 KIT | Refills: 0 | Status: SHIPPED | OUTPATIENT
Start: 2020-11-16

## 2020-11-20 NOTE — PATIENT INSTRUCTIONS
Colonoscopy-  colorectal cancer    [employer] [facility]  [address] [facility],   [place], [address]  Phone: [phone]    [facility] [address] [place] [address], [address] [place], [address]- also locations at [place] [address] and [place]   Phone: [phone] Discharge Planner PT   Patient plan for discharge: home with assist  Current status:  DEFER- Per discussion with OT, pt is up independent and tolerating 15 minutes of continuous aerobic exercise on bike. No need for two therapy disciplines during admission. Anticipating discharge in the next few days. No IP PT needs, PT order completed.   Barriers to return to prior living situation: medical status  Recommendations for discharge: defer to OT  Rationale for recommendations: no IP PT needs       Entered by: Maya Martines 11/20/2020 4:32 PM

## 2020-12-07 DIAGNOSIS — E11.29 TYPE 2 DIABETES MELLITUS WITH MICROALBUMINURIA, WITHOUT LONG-TERM CURRENT USE OF INSULIN (HCC): ICD-10-CM

## 2020-12-07 DIAGNOSIS — R80.9 TYPE 2 DIABETES MELLITUS WITH MICROALBUMINURIA, WITHOUT LONG-TERM CURRENT USE OF INSULIN (HCC): ICD-10-CM

## 2020-12-07 RX ORDER — ATORVASTATIN CALCIUM 20 MG/1
20 TABLET, FILM COATED ORAL
Qty: 90 TAB | Refills: 1 | Status: SHIPPED | OUTPATIENT
Start: 2020-12-07 | End: 2021-04-05 | Stop reason: SDUPTHER

## 2020-12-07 RX ORDER — GLIPIZIDE 5 MG/1
TABLET ORAL
Qty: 180 TAB | Refills: 0 | Status: SHIPPED | OUTPATIENT
Start: 2020-12-07 | End: 2020-12-28

## 2021-02-17 NOTE — PROGRESS NOTES
Patient: Jesus Luna Date: 2021    : 1952 Attending: Curt La MD   68 year old male [id number]     Subjective: Doing well this AM. Sitting up in the chair. Denies any abdominal pain or nausea.  No recent loose blood or dark stool.     Allergies:   ALLERGIES:   Allergen Reactions   • Penicillins Other (See Comments)     Unknown/ states he was 6 years old when he had the reaction and does not recall what it was.       Vitals:    Vital Last Value 24 Hour Range   Temperature 98.3 °F (36.8 °C) Temp  Min: 98.2 °F (36.8 °C)  Max: 98.5 °F (36.9 °C)   Pulse 64 Pulse  Min: 64  Max: 74   Respiratory 20 Resp  Min: 20  Max: 20   BloodPressure 129/71 BP  Min: 98/54  Max: 129/71   Pulse Oximetry 94 % SpO2  Min: 94 %  Max: 97 %   CVP   No data recorded     Vital Today Admit   Weight 132.4 kg Weight: 135 kg   Height N/A Height: 5' 10\" (177.8 cm)   BMI N/A BMI (Calculated): 42.7     Weight over the past 48 Hours:  Patient Vitals for the past 48 hrs:   Weight   21 0509 132.1 kg   21 0604 132.4 kg          Intake/Output:  Last Stool Occurrence: (0) (21 0554)      Intake/Output Summary (Last 24 hours) at 2021 0921  Last data filed at 2021 0634  Gross per 24 hour   Intake 2641.25 ml   Output --   Net 2641.25 ml       I/O last 3 completed shifts:  In: 2641.3 [P.O.:240; I.V.:2401.3]  Out: -       Physical Exam:   Gen: NAD  Abdomen: Distended, soft non tender    Laboratory Results:    Recent Labs   Lab 21  0514 21  0614  02/15/21  0535   WBC 16.4* 16.7*  --  18.1*   HCT 32.9* 33.6*  --  38.7*   HGB 10.5* 11.0*  --  12.6*    204  --  226   INR  --   --   --  1.0   PTT  --   --   --  27   BUN 55* 47*  --  39*   CREATININE 2.95* 2.24*   < > 1.52*   SODIUM 135 135  --  131*   POTASSIUM 3.8 3.9  --  4.1   CHLORIDE 101 101  --  97*   AST 61* 63*  --  42*   ALKPT 190* 169*  --  152*   BILIRUBIN 0.3 0.6  --  0.8   CO2 23 25  --  26   GPT 43 49  --  39   GLUCOSE 213*  HISTORY OF PRESENT ILLNESS  Jaimee Palencia is a 67 y.o. female. 5/2/2017  10:08 AM    Chief Complaint   Patient presents with    Follow Up Chronic Condition     HTN, Diabetes    Bloated     it go's and comes been this way for almost a year    Finger Swelling     pointer finger right hand, hurting for a month       HPI: Here today for follow up on chronic conditions. Labs done 2/2017. Follows with eye doctor. HTN: Taking Losartan as prescribed. Does not check BP at home regularly. No complaints of headaches, dizziness, chest pain, or leg swelling. Diabetes: Does check her sugars at home. Currently on Glipizide and Metformin. Does attempt to eat ADA diet at home. LDL 88 2/2017. Not on statin, is on ARB. Does not follow with endocrine. Does follow with optometry. Last HgBA1c controlled. Osteoporosis: First Prolia injection completed 2/2017. Previously on Fosamax. Reports bone and muscle pain with Fosamax. Taking Calcium and Vit D. Complains of right knuckle pain that has been happening for the last few months. She states that she is unable to bend it quite as much the other fingers. No injuries. 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 Has not been taking any medications. Also complains of abdominal bloating that has been happening for 1 year. She has not been taking anything to help. BMs almost every day that have been loose (but not watery) since starting the Prolia. She denies any abdominal pain or cramping. Review of Systems   Constitutional: Negative for chills, fever and malaise/fatigue. Eyes: Negative for double vision, photophobia and pain. Respiratory: Negative for cough, shortness of breath and wheezing. Cardiovascular: Negative for chest pain, palpitations and leg swelling.    Gastrointestinal: Negative for abdominal pain, constipation, diarrhea, heartburn, nausea and vomiting.        +loose stools, abdominal bloating   Genitourinary: Negative for dysuria, frequency and 69  --  99   LIPA  --   --   --  2,032*   ALBUMIN 2.2* 2.3*  --  2.8*   CALCIUM 7.7* 8.4  --  9.1    < > = values in this interval not displayed.       Imaging:    Results for orders placed during the hospital encounter of 02/15/21   XR CHEST AP OR PA - PORTABLE    Narrative XR CHEST AP OR PA    HISTORY: Shortness of breath.    COMPARISON: 6/11/2020 chest radiographs.    TECHNIQUE:  Single, AP 75 degree semiupright view of the chest.    FINDINGS:    Cardiac conduction device leads are unchanged. Status post median  sternotomy  Stable mild cardiomegaly. The pulmonary vasculature is within normal  limits. No focal consolidation. No definite pleural effusion. No  pneumothorax.        Impression IMPRESSION:     No acute cardiopulmonary findings.    I have personally reviewed the images and modified the resident's report as  necessary.       Medications/Infusions:  Scheduled:   • atorvastatin  80 mg Oral Daily   • carvedilol  25 mg Oral BID WC   • [Held by provider] valsartan  80 mg Oral Daily   • sodium chloride (PF)  2 mL Intracatheter 2 times per day   • pantoprazole  40 mg Intravenous 2 times per day   • Potassium Standard Replacement Protocol   Does not apply See Admin Instructions   • Magnesium Standard Replacement Protocol   Does not apply See Admin Instructions   • cefTRIAXone (ROCEPHIN) IV  2,000 mg Intravenous Daily   • metroNIDAZOLE (FLAGYL) IVPB  500 mg Intravenous 3 times per day       Continuous Infusions:   • dextrose 5 % / sodium chloride 0.9% infusion 100 mL/hr at 02/17/21 0133         Diagnosis:  A 69 yo male with acute abdominal pain. Known history of Rectal cancer first dx in 2014. History of neoadjuvant chemo/radiation. Now presents with acute cholecystitis. Ct imaging reveals a new 6.1 calcified liver mass in the right lateral liver lobe concerning for possible new metastatic disease. Noted CEA tumor marker elevated at 274.  CKD stage III  Cardiomyopathy    Plans/Recommendations:   -Cardiology did  urgency. Musculoskeletal: Positive for joint pain. Negative for back pain and myalgias. Neurological: Negative for dizziness, weakness and headaches. PHQ Screening   PHQ 2 / 9, over the last two weeks 2/2/2017   Little interest or pleasure in doing things Not at all   Feeling down, depressed or hopeless Not at all   Total Score PHQ 2 0         History  Past Medical History:   Diagnosis Date    Diabetes ([address]     Hypertension     Macular degeneration 9/2015    Osteoporosis 09/2015    on Prolia starting 2/2017       Past Surgical History:   Procedure Laterality Date    HX REFRACTIVE SURGERY  12/2016    right eye       Social History     Social History    Marital status:      Spouse name: N/A    Number of children: N/A    Years of education: N/A     Occupational History    Not on file. Social History Main Topics    Smoking status: Never Smoker    Smokeless tobacco: Never Used    Alcohol use No    Drug use: No    Sexual activity: Not Currently     Other Topics Concern    Not on file     Social History Narrative       Family History   Problem Relation Age of Onset    Heart Disease Mother     Heart Disease Father        Allergies   Allergen Reactions    Aspralum [Aspirin, Buffered] Other (comments)     Causes abdominal cramping       Current Outpatient Prescriptions   Medication Sig Dispense Refill    COQ10, UBIQUINOL, PO Take  by mouth.  Calcium-Cholecalciferol, D3, (CALCIUM 500 + D) 500 mg(1,250mg) -400 unit tab Take 1 Tab by mouth two (2) times a day. 180 Tab 4    metFORMIN (GLUCOPHAGE) 1,000 mg tablet Take 1 Tab by mouth two (2) times daily (with meals). 180 Tab 1    losartan (COZAAR) 25 mg tablet Take 1 Tab by mouth daily. 90 Tab 1    glipiZIDE (GLUCOTROL) 5 mg tablet Take 1 Tab by mouth daily. 90 Tab 1    denosumab (PROLIA) 60 mg/mL injection 1 mL by SubCUTAneous route every 6 months. To be done at [facility].  1 Syringe 1 Health Maintenance and Screenings  *Medicare Wellness 11/2/2016    Updates:     Colon Cancer: Scheduled for colonoscopy 6/28/2017       Advance Care Planning:   Patient was offered the opportunity to discuss advance care planning NO   Does patient have an Advance Directive:  NO   If no, did you provide information on Caring Connections? Patient Care Team:  Patient Care Team:  Susan Christensen NP as PCP - General (Nurse Practitioner)  Jose Lizama MD (Ophthalmology)        LABS:     Ref. Range 5/2/2017 10:00   Hemoglobin A1c (POC) Latest Units: % 5.6       RADIOLOGY:  None new to review      Physical Exam   Constitutional: She is oriented to person, place, and time. She appears well-developed and well-nourished. No distress. Neck: Normal range of motion. Neck supple. Cardiovascular: Normal rate, regular rhythm and normal heart sounds. No murmur heard. Pulmonary/Chest: Effort normal and breath sounds normal. No respiratory distress. Abdominal: Soft. Bowel sounds are normal. She exhibits distension. There is no tenderness. There is no rebound. Musculoskeletal: She exhibits no edema. Right hand: She exhibits normal range of motion, no deformity and no swelling. Normal sensation noted. Normal strength noted. Hands:  -pain reported in knuckle of right hand   Neurological: She is alert and oriented to person, place, and time. She exhibits normal muscle tone. Coordination normal.   Skin: Skin is warm and dry. Abdominal girth: 36 inches      Vitals:    05/02/17 1005   BP: 119/63   Pulse: (!) 102   Resp: 18   Temp: 98 °F (36.7 °C)   TempSrc: Oral   SpO2: 96%   Weight: 123 lb (55.8 kg)   Height: 5' 4\" (1.626 m)   PainSc:   3   PainLoc: Finger     Wt Readings from Last 3 Encounters:   05/02/17 123 lb (55.8 kg)   04/19/17 121 lb (54.9 kg)   03/17/17 123 lb 7.3 oz (56 kg)       ASSESSMENT and Yajaira Roman was seen today for follow up chronic condition.     Diagnoses and all orders for this clear patient for planned     Lap Shirley today with Dr. Rodriguez. Will plan for core     Liver biopsy during surgery.     Harshal Ruiz PA-C  [phone]     Surgical Oncology Attending Note    -OR today for lap shirley with ACS  -Will be available for liver biopsy  -Will still require completion staging with CT chest, upper and lower endoscopy- GI following    I have examined the patient, reviewed the pertinent diagnostic studies and medical data, and have participated in the development of the treatment plan with the Fellow / Resident / Advanced Practice Practitioner / Medical Student. I have made appropriate addendums and agree with the documentation stated above.    Len Valentine MD  Surgical Oncology  [address], [address]  [place] [address]  T [phone]  F [phone]             visit:      Essential hypertension  *Controlled. Continue current medication. No need for refills today. Type 2 diabetes mellitus without complication, without long-term current use of insulin ([facility] [address]  *Recheck controlled. Continue with current medications. - AMB POC HEMOGLOBIN A1C    Osteoporosis, unspecified osteoporosis type, unspecified pathological fracture presence  *Prolia injection due 8/2017. Recheck DEXA in fall 2017. Finger pain, right  *Discussed with patient about possible causes for pain. Appears to be arthritis in nature since no injury or erythema/swelling. Advised on xray that could be done- declines at this time. Recommended to do PRN Tylenol or NSAID with ice. Abdominal bloating  *Discussed with patient about possible causes including abdominal mass, flatulence, adipose tissue accumulation, GERD, etc. Advised on abdominal US to rule out mass, she declines at this time. Advised on trying Gas-X or Zantac OTC to see if helps with symptoms. Has upcoming colonoscopy. Measurement of abdomen done today and will compare to future if continued complaints. Normal LFTs. *Discussed with patient about symptoms that would require an ER visit. Patient understands symptoms that are an emergency and will go to the ER if they occur. *Plan of care reviewed with patient. Patient in agreement with plan and expresses understanding. All questions answered and patient encouraged to call or RTO if further questions or concerns. Follow-up Disposition:  Return in about 3 months (around 8/2/2017) for chronic disease followup- 15 min.

## 2021-03-26 ENCOUNTER — TRANSCRIBE ORDER (OUTPATIENT)
Dept: SCHEDULING | Age: 76
End: 2021-03-26

## 2021-03-26 DIAGNOSIS — Z12.31 VISIT FOR SCREENING MAMMOGRAM: Primary | ICD-10-CM

## 2021-05-11 ENCOUNTER — HOSPITAL ENCOUNTER (OUTPATIENT)
Dept: MAMMOGRAPHY | Age: 76
Discharge: HOME | End: 2021-05-11
Attending: FAMILY MEDICINE
Payer: MEDICARE

## 2021-05-11 DIAGNOSIS — Z12.31 VISIT FOR SCREENING MAMMOGRAM: ICD-10-CM

## 2021-05-11 PROCEDURE — 77067 SCR MAMMO BI INCL CAD: CPT

## 2021-06-08 ENCOUNTER — OFFICE VISIT (OUTPATIENT)
Dept: FAMILY MEDICINE CLINIC | Age: 76
End: 2021-06-08
Payer: MEDICARE

## 2021-06-08 VITALS
HEIGHT: 64 IN | DIASTOLIC BLOOD PRESSURE: 82 MMHG | WEIGHT: 120 LBS | TEMPERATURE: 98.3 F | BODY MASS INDEX: 20.49 KG/M2 | OXYGEN SATURATION: 99 % | RESPIRATION RATE: 16 BRPM | HEART RATE: 94 BPM | SYSTOLIC BLOOD PRESSURE: 128 MMHG

## 2021-06-08 DIAGNOSIS — K08.89 PAIN, DENTAL: ICD-10-CM

## 2021-06-08 DIAGNOSIS — E11.9 WELL CONTROLLED DIABETES MELLITUS (HCC): Primary | ICD-10-CM

## 2021-06-08 DIAGNOSIS — E78.1 HYPERTRIGLYCERIDEMIA: ICD-10-CM

## 2021-06-08 DIAGNOSIS — E11.3593 PROLIFERATIVE DIABETIC RETINOPATHY OF BOTH EYES ASSOCIATED WITH TYPE 2 DIABETES MELLITUS, UNSPECIFIED PROLIFERATIVE RETINOPATHY TYPE (HCC): ICD-10-CM

## 2021-06-08 DIAGNOSIS — Z00.00 MEDICARE ANNUAL WELLNESS VISIT, SUBSEQUENT: ICD-10-CM

## 2021-06-08 DIAGNOSIS — I10 ESSENTIAL HYPERTENSION: ICD-10-CM

## 2021-06-08 DIAGNOSIS — M81.0 OSTEOPOROSIS WITHOUT CURRENT PATHOLOGICAL FRACTURE, UNSPECIFIED OSTEOPOROSIS TYPE: ICD-10-CM

## 2021-06-08 LAB — HBA1C MFR BLD HPLC: 5.6 %

## 2021-06-08 PROCEDURE — G8752 SYS BP LESS 140: HCPCS | Performed by: FAMILY MEDICINE

## 2021-06-08 PROCEDURE — 1090F PRES/ABSN URINE INCON ASSESS: CPT | Performed by: FAMILY MEDICINE

## 2021-06-08 PROCEDURE — G8536 NO DOC ELDER MAL SCRN: HCPCS | Performed by: FAMILY MEDICINE

## 2021-06-08 PROCEDURE — 99214 OFFICE O/P EST MOD 30 MIN: CPT | Performed by: FAMILY MEDICINE

## 2021-06-08 PROCEDURE — G0439 PPPS, SUBSEQ VISIT: HCPCS | Performed by: FAMILY MEDICINE

## 2021-06-08 PROCEDURE — G8420 CALC BMI NORM PARAMETERS: HCPCS | Performed by: FAMILY MEDICINE

## 2021-06-08 PROCEDURE — G8510 SCR DEP NEG, NO PLAN REQD: HCPCS | Performed by: FAMILY MEDICINE

## 2021-06-08 PROCEDURE — G8427 DOCREV CUR MEDS BY ELIG CLIN: HCPCS | Performed by: FAMILY MEDICINE

## 2021-06-08 PROCEDURE — 83036 HEMOGLOBIN GLYCOSYLATED A1C: CPT | Performed by: FAMILY MEDICINE

## 2021-06-08 PROCEDURE — G8754 DIAS BP LESS 90: HCPCS | Performed by: FAMILY MEDICINE

## 2021-06-08 PROCEDURE — 1101F PT FALLS ASSESS-DOCD LE1/YR: CPT | Performed by: FAMILY MEDICINE

## 2021-06-08 NOTE — ACP (ADVANCE CARE PLANNING)
Advance Care Planning     General Advance Care Planning (ACP) Conversation      Date of Conversation: 6/8/2021  Conducted with: Patient with Decision Making Capacity    Healthcare Decision Maker:     Click here to complete [address] including selection of the Healthcare Decision Maker Relationship (ie \"Primary\")  Today we discussed advance directive and she has it made at home.  will provide a copy     Content/Action Overview:   see above       Length of Voluntary ACP Conversation in minutes:  <16 minutes (Non-Billable)    Sunni Johnson MD

## 2021-06-08 NOTE — PATIENT INSTRUCTIONS
Medicare Wellness Visit, Female The best way to live healthy is to have a lifestyle where you eat a well-balanced diet, exercise regularly, limit alcohol use, and quit all forms of tobacco/nicotine, if applicable. Regular preventive services are another way to keep healthy. Preventive services (vaccines, screening tests, monitoring & exams) can help personalize your care plan, which helps you manage your own care. Screening tests can find health problems at the earliest stages, when they are easiest to treat. [facility] follows the current, evidence-based guidelines published by the [facility] ([facility]) when recommending preventive services for our patients. Because we follow these guidelines, sometimes recommendations change over time as research supports it. (For example, mammograms used to be recommended annually. Even though Medicare will still pay for an annual mammogram, the newer guidelines recommend a mammogram every two years for women of average risk). Of course, you and your doctor may decide to screen more often for some diseases, based on your risk and your co-morbidities (chronic disease you are already diagnosed with). Preventive services for you include: - Medicare offers their members a free annual wellness visit, which is time for you and your primary care provider to discuss and plan for your preventive service needs. Take advantage of this benefit every year! 
-All adults over the age of 72 should receive the recommended pneumonia vaccines. Current USPSTF guidelines recommend a series of two vaccines for the best pneumonia protection.  
-All adults should have a flu vaccine yearly and a tetanus vaccine every 10 years.  
-All adults age 48 and older should receive the shingles vaccines (series of two vaccines).      
-All adults age 38-68 who are overweight should have a diabetes screening test once every three years.  
-All adults born between [birth year] and 1966 should be screened once for Hepatitis C. 
-Other screening tests and preventive services for persons with diabetes include: an eye exam to screen for diabetic retinopathy, a kidney function test, a foot exam, and stricter control over your cholesterol.  
-Cardiovascular screening for adults with routine risk involves an electrocardiogram (ECG) at intervals determined by your doctor.  
-Colorectal cancer screenings should be done for adults age 54-65 with no increased risk factors for colorectal cancer. There are a number of acceptable methods of screening for this type of cancer. Each test has its own benefits and drawbacks. Discuss with your doctor what is most appropriate for you during your annual wellness visit. The different tests include: colonoscopy (considered the best screening method), a fecal occult blood test, a fecal DNA test, and sigmoidoscopy. 
 
-A bone mass density test is recommended when a woman turns 65 to screen for osteoporosis. This test is only recommended one time, as a screening. Some providers will use this same test as a disease monitoring tool if you already have osteoporosis. -Breast cancer screenings are recommended every other year for women of normal risk, age 54-69. 
-Cervical cancer screenings for women over age 72 are only recommended with certain risk factors. Here is a list of your current Health Maintenance items (your personalized list of preventive services) with a due date: 
Health Maintenance Due Topic Date Due  
 Hepatitis C Test  Never done  COVID-19 Vaccine (1) Never done  DTaP/Tdap/Td  (1 - Tdap) Never done  Shingles Vaccine (1 of 2) Never done  Albumin Urine Test  01/02/2020  Cholesterol Test   08/02/2020 Learning About Low-Fat Eating What is low-fat eating? Most food has some fat in it. Your body needs some fat to be healthy. But some kinds of fats are healthier than others.  
In a low-fat eating plan, you try to choose healthier fats and eat fewer unhealthy fats. Healthy fats include olive and canola oil. Try to avoid eating too much saturated fat, such as in cheese and meats. You do not need to cut all fat from your diet. But you can make healthier choices about the types and amount of fat you eat. Even though it is a good idea to choose healthier fats, it is still important to be careful of how much fat you eat, because all fats are high in calories. What are the different types of fats? Unhealthy fat · Saturated fat. Saturated fats are mostly in animal foods, such as meat and dairy foods. Tropical oils, such as coconut oil, palm oil, and cocoa butter, are also saturated fats. Healthy fats · Monounsaturated fat. Monounsaturated fats are liquid at room temperature but get solid when refrigerated. Eating foods that are high in this fat may help lower your \"bad\" (LDL) cholesterol, keep your \"good\" (HDL) cholesterol level up, and lower your chances of getting coronary artery disease. This fat is found in canola oil, olive oil, peanut oil, olives, avocados, nuts, and nut butters. · Polyunsaturated fat. Polyunsaturated fats are liquid at room temperature. They are in safflower, sunflower, and corn oils. They are also the main fat in seafood. Omega-3 fatty acids are types of polyunsaturated fat. Eating fish may lower your chances of getting coronary artery disease. Fatty fish such as salmon and mackerel contain these healthy fatty acids. So do ground flaxseeds and flaxseed oil, soybeans, walnuts, and seeds. Why cut down on unhealthy fats? Eating foods that contain saturated fats can raise the LDL (\"bad\") cholesterol in your blood. Having a high level of LDL cholesterol increases your chance of hardening of the arteries (atherosclerosis), which can lead to heart disease, heart attack, and stroke. In general: · No more than 10% of your daily calories should come from saturated fat.  This is about 20 grams in a 2,000-calorie diet. · No more than 10% of your daily calories should come from polyunsaturated fat. This is about 20 grams in a 2,000-calorie diet. · Monounsaturated fats can be up to 15% of your daily calories. This is about 25 to 30 grams in a 2,000-calorie diet. If you're not sure how much fat you should be eating or how many calories you need each day to stay at a healthy weight, talk to a registered dietitian. A dietitian can help you create a plan that's right for you. What can you do to cut down on fat? Foods like cheese, butter, sausage, and desserts can have a lot of unhealthy fats. Try these tips for healthier meals at home and when you eat out. At home · Fill up on fruits, vegetables, and whole grains. · Think of meat as a side dish instead of as the main part of your meal. 
· When you do eat meat, make it extra-lean ground beef (97% lean), ground turkey breast (without skin added), meats with fat trimmed off before cooking, or skinless chicken. · Try main dishes that use whole wheat pasta, brown rice, dried beans, or vegetables. · Use cooking methods that use little or no fat, such as broiling, steaming, or grilling. Use cooking spray instead of oil. If you use oil, use a monounsaturated oil, such as canola or olive oil. · Read food labels on canned, bottled, or packaged foods. Choose those with little saturated fat. When eating out at a restaurant · Order foods that are broiled or poached instead of fried or breaded. · Cut back on the amount of butter or margarine that you use on bread. Use small amounts of olive oil instead. · Order sauces, gravies, and salad dressings on the side, and use only a little. · When you order pasta, choose tomato sauce instead of cream sauce. · Ask for salsa with your baked potato instead of sour cream, butter, cheese, or hodges. Where can you learn more? Go to http://www.gray.com/ Enter G794 in the search box to learn more about \"Learning About Low-Fat Eating. \" Current as of: December 17, 2020               Content Version: 12.8 © 2006-2021 Healthwise, [facility]. Care instructions adapted under license by [employer] (which disclaims liability or warranty for this information). If you have questions about a medical condition or this instruction, always ask your healthcare professional. [address] any warranty or liability for your use of this information. Learning About Low-Carbohydrate Diets What is a low-carbohydrate diet? A low-carbohydrate (or \"low-carb\") diet limits foods and drinks that have carbohydrates. This includes grains, fruits, milk and yogurt, and starchy vegetables like potatoes, beans, and corn. It also avoids foods and drinks that have added sugar. Instead, low-carb diets include foods that are high in protein and fat. Why might you follow a low-carb diet? Low-carb diets may be used for a variety of reasons, such as for weight loss. People who have diabetes may use a low-carb diet to help manage their blood sugar levels. What should you do before you start the diet? Talk to your doctor before you try any diet. This is even more important if you have health problems like kidney disease, heart disease, or diabetes. Your doctor may suggest that you meet with a registered dietitian. A dietitian can help you make an eating plan that works for you. What foods do you eat on a low-carb diet? On a low-carb diet, you choose foods that are high in protein and fat. Examples of these are: · Meat, poultry, and fish. · Eggs. · Nuts, such as walnuts, pecans, almonds, and peanuts. · Peanut butter and other nut butters. · Tofu. · Avocado. · Halima Canny. · Non-starchy vegetables like broccoli, cauliflower, green beans, mushrooms, peppers, lettuce, and spinach. · Unsweetened non-dairy milks like almond milk and coconut milk. · Cheese, cottage cheese, and cream cheese. Current as of: December 17, 2020               Content Version: 12.8 © 6692-8054 Healthwise, [facility]. Care instructions adapted under license by [employer] (which disclaims liability or warranty for this information). If you have questions about a medical condition or this instruction, always ask your healthcare professional. [place] [address] any warranty or liability for your use of this information.

## 2021-06-08 NOTE — PROGRESS NOTES
HISTORY OF PRESENT ILLNESS  David Chapa is a 67 y.o. female. HPI: Here for follow-up. History of diabetes. Today A1c around 5.8. Said 1 episode of low sugar around 49. Currently had a dental procedure and also had a plate put on and that she is in pain due to procedure. Also having trouble swallowing due to pain. She has not ate since morning. I have advised her to hold off on glipizide that her A1c is in prediabetic range and can cause hypoglycemia due to her current symptoms. She understood and agree with it. For now we will continue with the Metformin with the lifestyle modification. Denies any hyper or hypoglycemic symptoms at this time. Having some hard time talking due to recent dental procedure couple of days ago. Denies any headache or dizziness. No chest pain or shortness of breath. No palpitation or diaphoresis. No nausea vomiting or abdominal pain. No mood changes. No urinary or bowel complaint. No weight changes. No known thyroid problem. Due for labs. Blood pressure has been stable. Visit Vitals  /82 (BP 1 Location: Left arm, BP Patient Position: Sitting, BP Cuff Size: Adult)   Pulse 94   Temp 98.3 °F (36.8 °C) (Oral)   Resp 16   Ht 5' 4\" (1.626 m)   Wt 120 lb (54.4 kg)   SpO2 99%   BMI 20.60 kg/m²     Review medication list, vitals, problem list,allergies. No labs done since 2019. She will go today downstairs to get the labs done. Osteoporosis. She was on Prolia when she was with [address] [place] [address]. After that she lost the approval for Prolia. Taking calcium and vitamin D since then. At this time due to having a dental procedure and tooth extraction and still has a few months on and off for dental procedure I will hold off on  Prolia. Once she is cleared by dentist we will do the referral to rheumatology to consider restarting Prolia and further evaluation.   Lab Results   Component Value Date/Time    Hemoglobin A1c 5.9 (H) 01/02/2019 10:48 AM Hemoglobin A1c (POC) 5.9 02/05/2020 12:29 PM         ROS: See HPI    Physical Exam  Cardiovascular:      Rate and Rhythm: Normal rate and regular rhythm. Pulmonary:      Effort: Pulmonary effort is normal. No respiratory distress. Breath sounds: No wheezing. Abdominal:      Palpations: Abdomen is soft. Tenderness: There is no abdominal tenderness. Musculoskeletal:         General: No swelling. Cervical back: Neck supple. Neurological:      Mental Status: She is alert and oriented to person, place, and time. ASSESSMENT and PLAN    ICD-10-CM ICD-9-CM    1. Well controlled diabetes mellitus ([facility] [address]): A1c in prediabetic range around 5.8. Will discontinue glipizide at this time and continue Metformin. Had one episode of low blood sugar around 49. Also having a dental work done and having trouble swallowing and decreased appetite might increase risk of hypoglycemia. She understood and agreed with the plan E11.9 250.00 AMB POC HEMOGLOBIN A1C      CBC W/O DIFF      METABOLIC PANEL, COMPREHENSIVE      LIPID PANEL      MICROALBUMIN, UR, RAND W/ MICROALB/CREAT RATIO      TSH 3RD GENERATION   2. Medicare annual wellness visit, subsequent  Z00.00 V70.0    3. 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 getting shot in rt eye every 4-6 wks    4. Essential hypertension :well controlled. Continue current dose of medication and low salt diet. Exercise as tolerated. I10 401.9    5. Osteoporosis without current pathological fracture, unspecified osteoporosis type: Off Prolia. Currently ongoing dental procedure. Once she is cleared by dentist will consider rheumatology referral.  Meantime calcium and vitamin D M81.0 733.00     was taking prolia with charlene with kenny alan. stopped since then couple of years ago.     6. Pain, dental: Managed by dentist.  Will observe. On pain medication at this time K08.89 525.9     had tooth extraction and plate. having trouble swallowing for it. that is the reason not taken anything today. 7. Hypertriglyceridemia\" taking statin. No repeat labs since 2019. I have advised to repeat the labs which she will do it today as she is fasting. Mean time continue current Statin dose and diet modification. Further discussion after lab result. Patient understood agree with the plan  She will do labs today  She is due for Tdap and shingles vaccine which she will talk to the pharmacist after checking cost  Follow-up and Dispositions    · Return in about 3 months (around 9/8/2021). Please note that this dictation was completed with RNDOMN, the computer voice recognition software. Quite often unanticipated grammatical, syntax, homophones, and other interpretive errors are inadvertently transcribed by the computer software. Please disregard these errors. Please excuse any errors that have escaped final proofreading.

## 2021-06-08 NOTE — PROGRESS NOTES
This is the Subsequent Medicare Annual Wellness Exam, performed 12 months or more after the Initial AWV or the last Subsequent AWV    I have reviewed the patient's medical history in detail and updated the computerized patient record. Assessment/Plan   Education and counseling provided:  Are appropriate based on today's review and evaluation   Discussed 5 years health plan and advance directive. See ACP note from today     1. Well controlled diabetes mellitus ([facility] [address]  -     AMB POC HEMOGLOBIN A1C  2. Medicare annual wellness visit, subsequent       Depression Risk Factor Screening     3 most recent PHQ Screens 10/2/2020   Little interest or pleasure in doing things Not at all   Feeling down, depressed, irritable, or hopeless Not at all   Total Score PHQ 2 0       Alcohol Risk Screen    Do you average more than 1 drink per night or more than 7 drinks a week:  No    On any one occasion in the past three months have you have had more than 3 drinks containing alcohol:  No        Functional Ability and Level of Safety    Hearing: Hearing is good. Activities of Daily Living: The home contains: handrails, grab bars and shower bench, raised toilet  Patient does total self care      Ambulation: with no difficulty     Fall Risk:  Fall Risk Assessment, last 12 mths 10/2/2020   Able to walk? Yes   Fall in past 12 months? No   Number of falls in past 12 months -   Fall with injury?  -      Abuse Screen:  Patient is not abused       Cognitive Screening    Has your family/caregiver stated any concerns about your memory: no         Health Maintenance Due     Health Maintenance Due   Topic Date Due    Hepatitis C Screening  Never done    COVID-19 Vaccine (1) Never done    DTaP/Tdap/Td series (1 - Tdap) Never done    Shingrix Vaccine Age 50> (1 of 2) Never done    MICROALBUMIN Q1  01/02/2020    Lipid Screen  08/02/2020   ordered labs today  Up to date with mammogram   Holding prolia since last couple of years as [facility] clinic was closed.   Now having dental procedure so will hold off on a restart on prolia and rheumatology referral  She will talk to the pharmacist regarding shingle vaccine and tdap after checking the cost     Patient Care Team   Patient Care Team:  Matthias Hackett MD as PCP - General (Family Medicine)  Matthias Hackett MD as PCP - [facility]  London Kayser, MD (Ophthalmology)  Leticia Fuentes MD (Ophthalmology)    History     Patient Active Problem List   Diagnosis Code    Essential hypertension I10    Osteoporosis M81.0    Type II diabetes mellitus ([address] E11.9    Macular degeneration H35.30    Diabetic retinopathy of both eyes ([address] E11.319    Diabetic macular edema ([address] E11.311    Type 2 diabetes mellitus with proliferative retinopathy ([address] K31.2936     Past Medical History:   Diagnosis Date    Cataract 04/2017    bilateral     Diabetes ([address]     Diabetic retinopathy of both eyes ([address] 04/2017    Hypertension     Macular degeneration 9/2015    Osteoporosis 09/2015    on Prolia starting 2/2017      Past Surgical History:   Procedure Laterality Date    COLONOSCOPY N/A 7/19/2017    COLONOSCOPY performed by Coy Magana MD at [address]  12/2016    right eye     Current Outpatient Medications   Medication Sig Dispense Refill    glipiZIDE (GLUCOTROL) 5 mg tablet TAKE 1 TABLET BY MOUTH  TWICE DAILY 30 Tablet 1    losartan (COZAAR) 25 mg tablet TAKE 1 TABLET BY MOUTH  DAILY 90 Tablet 1    metFORMIN (GLUCOPHAGE) 1,000 mg tablet TAKE 1 TABLET BY MOUTH  TWICE DAILY WITH MEALS 180 Tab 0    atorvastatin (LIPITOR) 20 mg tablet TAKE 1 TABLET BY MOUTH AT  NIGHT 90 Tab 0    Blood-Glucose Meter (True Metrix Air Glucose Meter) monitoring kit Dx: E11.9 1 Kit 0    glucose blood VI test strips (True Metrix Glucose Test Strip) strip Test blood sugar 1 hour prior to breakfast and 1 hour after dinner DX E11.9 100 Strip 0    lancets 33 gauge misc TEST BLOOD SUGAR AS DIRECTED 300 Lancet 0    cholecalciferol (VITAMIN D3) 2,000 unit cap capsule Take 2,000 Units by mouth daily. 30 Cap 5    VIT C/E/ZN/COPPR/LUTEIN/ZEAXAN (PRESERVISION AREDS 2 PO) Take  by mouth.  COQ10, UBIQUINOL, PO Take  by mouth.  Calcium-Cholecalciferol, D3, (CALCIUM 500 + D) 500 mg(1,250mg) -400 unit tab Take 1 Tab by mouth two (2) times a day. 180 Tab 4    denosumab (PROLIA) 60 mg/mL injection 1 mL by SubCUTAneous route every 6 months. To be done at [facility].  1 Syringe 1     Allergies   Allergen Reactions    Aspralum [Aspirin, Buffered] Other (comments)     Causes abdominal cramping       Family History   Problem Relation Age of Onset    Heart Disease Mother     Heart Disease Father     Other Brother         brain aneurysm     Social History     Tobacco Use    Smoking status: Never Smoker    Smokeless tobacco: Never Used   Substance Use Topics    Alcohol use: No         Vianey Quezada LPN

## 2021-06-21 ENCOUNTER — APPOINTMENT (OUTPATIENT)
Dept: GENERAL RADIOLOGY | Age: 76
End: 2021-06-21
Attending: EMERGENCY MEDICINE
Payer: MEDICARE

## 2021-06-21 ENCOUNTER — HOSPITAL ENCOUNTER (EMERGENCY)
Age: 76
Discharge: HOME | End: 2021-06-21
Attending: EMERGENCY MEDICINE
Payer: MEDICARE

## 2021-06-21 VITALS
SYSTOLIC BLOOD PRESSURE: 154 MMHG | OXYGEN SATURATION: 99 % | TEMPERATURE: 99 F | DIASTOLIC BLOOD PRESSURE: 67 MMHG | HEIGHT: 64 IN | WEIGHT: 120 LBS | RESPIRATION RATE: 18 BRPM | HEART RATE: 89 BPM | BODY MASS INDEX: 20.49 KG/M2

## 2021-06-21 DIAGNOSIS — R55 SYNCOPE, UNSPECIFIED SYNCOPE TYPE: ICD-10-CM

## 2021-06-21 DIAGNOSIS — R53.83 MALAISE AND FATIGUE: Primary | ICD-10-CM

## 2021-06-21 DIAGNOSIS — R53.81 MALAISE AND FATIGUE: Primary | ICD-10-CM

## 2021-06-21 LAB
ALBUMIN SERPL-MCNC: 3.4 G/DL (ref 3.4–5)
ALBUMIN/GLOB SERPL: 0.9 {RATIO} (ref 0.8–1.7)
ALP SERPL-CCNC: 63 U/L (ref 45–117)
ALT SERPL-CCNC: 19 U/L (ref 13–56)
ANION GAP SERPL CALC-SCNC: 8 MMOL/L (ref 3–18)
AST SERPL-CCNC: 12 U/L (ref 10–38)
ATRIAL RATE: 97 BPM
BASOPHILS # BLD: 0.1 K/UL (ref 0–0.1)
BASOPHILS NFR BLD: 1 % (ref 0–2)
BILIRUB SERPL-MCNC: 0.7 MG/DL (ref 0.2–1)
BUN SERPL-MCNC: 30 MG/DL (ref 7–18)
BUN/CREAT SERPL: 28 (ref 12–20)
CALCIUM SERPL-MCNC: 9.1 MG/DL (ref 8.5–10.1)
CALCULATED P AXIS, ECG09: 51 DEGREES
CALCULATED R AXIS, ECG10: 37 DEGREES
CALCULATED T AXIS, ECG11: 59 DEGREES
CHLORIDE SERPL-SCNC: 103 MMOL/L (ref 100–111)
CK MB CFR SERPL CALC: ABNORMAL % (ref 0–4)
CK MB SERPL-MCNC: <1 NG/ML (ref 5–25)
CK SERPL-CCNC: 25 U/L (ref 26–192)
CO2 SERPL-SCNC: 27 MMOL/L (ref 21–32)
CREAT SERPL-MCNC: 1.08 MG/DL (ref 0.6–1.3)
DIAGNOSIS, 93000: NORMAL
DIFFERENTIAL METHOD BLD: ABNORMAL
EOSINOPHIL # BLD: 0.1 K/UL (ref 0–0.4)
EOSINOPHIL NFR BLD: 1 % (ref 0–5)
ERYTHROCYTE [DISTWIDTH] IN BLOOD BY AUTOMATED COUNT: 12.7 % (ref 11.6–14.5)
GLOBULIN SER CALC-MCNC: 3.6 G/DL (ref 2–4)
GLUCOSE SERPL-MCNC: 132 MG/DL (ref 74–99)
HCT VFR BLD AUTO: 34.3 % (ref 35–45)
HGB BLD-MCNC: 11.8 G/DL (ref 12–16)
LYMPHOCYTES # BLD: 1.9 K/UL (ref 0.9–3.6)
LYMPHOCYTES NFR BLD: 31 % (ref 21–52)
MCH RBC QN AUTO: 29.4 PG (ref 24–34)
MCHC RBC AUTO-ENTMCNC: 34.4 G/DL (ref 31–37)
MCV RBC AUTO: 85.5 FL (ref 74–97)
MONOCYTES # BLD: 0.6 K/UL (ref 0.05–1.2)
MONOCYTES NFR BLD: 10 % (ref 3–10)
NEUTS SEG # BLD: 3.3 K/UL (ref 1.8–8)
NEUTS SEG NFR BLD: 56 % (ref 40–73)
P-R INTERVAL, ECG05: 164 MS
PLATELET # BLD AUTO: 333 K/UL (ref 135–420)
PMV BLD AUTO: 9.6 FL (ref 9.2–11.8)
POTASSIUM SERPL-SCNC: 3.4 MMOL/L (ref 3.5–5.5)
PROT SERPL-MCNC: 7 G/DL (ref 6.4–8.2)
Q-T INTERVAL, ECG07: 342 MS
QRS DURATION, ECG06: 78 MS
QTC CALCULATION (BEZET), ECG08: 434 MS
RBC # BLD AUTO: 4.01 M/UL (ref 4.2–5.3)
SODIUM SERPL-SCNC: 138 MMOL/L (ref 136–145)
TROPONIN I SERPL-MCNC: <0.02 NG/ML (ref 0–0.04)
VENTRICULAR RATE, ECG03: 97 BPM
WBC # BLD AUTO: 6 K/UL (ref 4.6–13.2)

## 2021-06-21 PROCEDURE — 71045 X-RAY EXAM CHEST 1 VIEW: CPT

## 2021-06-21 PROCEDURE — 74011250636 HC RX REV CODE- 250/636: Performed by: EMERGENCY MEDICINE

## 2021-06-21 PROCEDURE — 93005 ELECTROCARDIOGRAM TRACING: CPT

## 2021-06-21 PROCEDURE — 80053 COMPREHEN METABOLIC PANEL: CPT

## 2021-06-21 PROCEDURE — 85025 COMPLETE CBC W/AUTO DIFF WBC: CPT

## 2021-06-21 PROCEDURE — 82553 CREATINE MB FRACTION: CPT

## 2021-06-21 PROCEDURE — 99285 EMERGENCY DEPT VISIT HI MDM: CPT

## 2021-06-21 RX ORDER — SODIUM CHLORIDE 9 MG/ML
1000 INJECTION, SOLUTION INTRAVENOUS ONCE
Status: COMPLETED | OUTPATIENT
Start: 2021-06-21 | End: 2021-06-21

## 2021-06-21 RX ADMIN — SODIUM CHLORIDE 1000 ML: 900 INJECTION, SOLUTION INTRAVENOUS at 15:33

## 2021-06-21 NOTE — ED TRIAGE NOTES
Pt here for evaluation of generalized weakness that began ~1 week ago. States that onset was after she began taking an antibiotic & tylenol arthritis as prescribed by dentist  for tooth extraction. States she discontinued the tylenol arthritis and finished the course of antibiotics last week. States the weakness has continued and that while she was getting in the car to come to the ED she began having chest pain, midsternal.  Denies vomiting or diarrhea. States she became nauseated while in waiting area. States in the past couple of days she has felt intermittent leg pain bilaterally.
78

## 2021-06-21 NOTE — ED NOTES
Pt ambulated to bathroom with assistance; returned to room 9 & cardiac monitor reapplied. Bed in lowest position, SR up x 2 for safety, call bell within easy reach. Instructed pt to call for assistance as needed; she verbalized understanding.

## 2021-06-21 NOTE — ED NOTES
I have reviewed discharge instructions with the patient. The patient verbalized understanding. Patient armband removed and shredded. Pt assisted to waiting area in a wheelchair to await arrival of ride home.

## 2021-06-21 NOTE — DISCHARGE INSTRUCTIONS
Routine labs and EKG normal.  Because of generalized weakness and fatigue not determined through today's evaluation. Follow-up with your primary care physician for recheck. Return for high fever, severe headache, chest pain, shortness of breath, or other acute medical emergencies as needed.

## 2021-06-21 NOTE — ED PROVIDER NOTES
25-year-old female presents for evaluation of severe malaise and fatigue. Patient notes weakness, generalized malaise, fatigue, progressive over the last 10 days. Patient reports syncope x2 over the weekend. She called her primary care physician today and was referred to the emergency department. No fever, chest pain, shortness of breath, focal weakness of arm or leg, change in speech, urinary symptoms, nausea, vomiting, diarrhea, melena, or hematochezia. Has history of diabetes.   On sugar checks at home they have all been \"normal.\"           Past Medical History:   Diagnosis Date    Cataract 04/2017    bilateral     Diabetes ([facility] [address]     Diabetic retinopathy of both eyes ([facility] [address] 04/2017    Hypertension     Macular degeneration 9/2015    Osteoporosis 09/2015    on Prolia starting 2/2017       Past Surgical History:   Procedure Laterality Date    COLONOSCOPY N/A 7/19/2017    COLONOSCOPY performed by [place] Hewitt MD at [address]  12/2016    right eye         Family History:   Problem Relation Age of Onset    Heart Disease Mother     Heart Disease Father     Other Brother         brain aneurysm       Social History     Socioeconomic History    Marital status:      Spouse name: Not on file    Number of children: Not on file    Years of education: Not on file    Highest education level: Not on file   Occupational History    Not on file   Tobacco Use    Smoking status: Never Smoker    Smokeless tobacco: Never Used   Substance and Sexual Activity    Alcohol use: No    Drug use: No    Sexual activity: Not Currently   Other Topics Concern    Not on file   Social History Narrative    Not on file     Social Determinants of Health     Financial Resource Strain:     Difficulty of Paying Living Expenses:    Food Insecurity:     Worried About Running Out of Food in the Last Year:     [address] in the Last Year:    Transportation Needs:     Lack of Transportation (Medical):  Lack of Transportation (Non-Medical):    Physical Activity:     Days of Exercise per Week:     Minutes of Exercise per Session:    Stress:     Feeling of Stress :    Social Connections:     Frequency of Communication with Friends and Family:     Frequency of Social Gatherings with Friends and Family:     Attends Congregation Services:     Active Member of Clubs or Organizations:     Attends Club or Organization Meetings:     Marital Status:    Intimate Partner Violence:     Fear of Current or Ex-Partner:     Emotionally Abused:     Physically Abused:     Sexually Abused: ALLERGIES: Aspralum [aspirin, buffered]; Aspirin; and Codeine    Review of Systems   Constitutional: Positive for fatigue. Negative for fever. Respiratory: Negative for shortness of breath. Cardiovascular: Negative for chest pain and leg swelling. Gastrointestinal: Negative for abdominal pain. Genitourinary: Negative for difficulty urinating. Neurological: Positive for syncope and weakness. Negative for headaches. All other systems reviewed and are negative. Vitals:    06/21/21 1530 06/21/21 1545 06/21/21 1600 06/21/21 1615   BP: 131/62 (!) 139/58 (!) 118/53 (!) 153/77   Pulse: 92 90 96 89   Resp: 24 16 20 17   Temp:       SpO2: 99% 96% 98% 99%   Weight:       Height:                Physical Exam  Vitals and nursing note reviewed. Constitutional:       General: She is not in acute distress. Appearance: She is well-developed. HENT:      Head: Normocephalic and atraumatic. Eyes:      General: No scleral icterus. Neck:      Comments: No thyromegaly. Cardiovascular:      Rate and Rhythm: Normal rate and regular rhythm. Heart sounds: No murmur heard. Pulmonary:      Effort: Pulmonary effort is normal. No respiratory distress. Breath sounds: Normal breath sounds. Abdominal:      Tenderness: There is no abdominal tenderness. There is no guarding or rebound.    Skin: General: Skin is warm and dry. Neurological:      General: No focal deficit present. Mental Status: She is alert and oriented to person, place, and time. Mental status is at baseline. Recent Results (from the past 12 hour(s))   EKG, 12 LEAD, INITIAL    Collection Time: 06/21/21  2:09 PM   Result Value Ref Range    Ventricular Rate 97 BPM    Atrial Rate 97 BPM    P-R Interval 164 ms    QRS Duration 78 ms    Q-T Interval 342 ms    QTC Calculation (Bezet) 434 ms    Calculated P Axis 51 degrees    Calculated R Axis 37 degrees    Calculated T Axis 59 degrees    Diagnosis       Normal sinus rhythm  Normal ECG  When compared with ECG of 16-APR-2015 14:49,  Nonspecific T wave abnormality no longer evident in Anterolateral leads  QT has shortened  Confirmed by Bibiana Husain MD, Mary Martini ([id number]) on 6/21/2021 4:19:22 PM     CBC WITH AUTOMATED DIFF    Collection Time: 06/21/21  2:38 PM   Result Value Ref Range    WBC 6.0 4.6 - 13.2 K/uL    RBC 4.01 (L) 4.20 - 5.30 M/uL    HGB 11.8 (L) 12.0 - 16.0 g/dL    HCT 34.3 (L) 35.0 - 45.0 %    MCV 85.5 74.0 - 97.0 FL    MCH 29.4 24.0 - 34.0 PG    MCHC 34.4 31.0 - 37.0 g/dL    RDW 12.7 11.6 - 14.5 %    PLATELET 518 166 - 027 K/uL    MPV 9.6 9.2 - 11.8 FL    NEUTROPHILS 56 40 - 73 %    LYMPHOCYTES 31 21 - 52 %    MONOCYTES 10 3 - 10 %    EOSINOPHILS 1 0 - 5 %    BASOPHILS 1 0 - 2 %    ABS. NEUTROPHILS 3.3 1.8 - 8.0 K/UL    ABS. LYMPHOCYTES 1.9 0.9 - 3.6 K/UL    ABS. MONOCYTES 0.6 0.05 - 1.2 K/UL    ABS. EOSINOPHILS 0.1 0.0 - 0.4 K/UL    ABS.  BASOPHILS 0.1 0.0 - 0.1 K/UL    DF AUTOMATED     METABOLIC PANEL, COMPREHENSIVE    Collection Time: 06/21/21  2:38 PM   Result Value Ref Range    Sodium 138 136 - 145 mmol/L    Potassium 3.4 (L) 3.5 - 5.5 mmol/L    Chloride 103 100 - 111 mmol/L    CO2 27 21 - 32 mmol/L    Anion gap 8 3.0 - 18 mmol/L    Glucose 132 (H) 74 - 99 mg/dL    BUN 30 (H) 7.0 - 18 MG/DL    Creatinine 1.08 0.6 - 1.3 MG/DL    BUN/Creatinine ratio 28 (H) 12 - 20      GFR est AA 60 (L) >60 ml/min/1.73m2    GFR est non-AA 49 (L) >60 ml/min/1.73m2    Calcium 9.1 8.5 - 10.1 MG/DL    Bilirubin, total 0.7 0.2 - 1.0 MG/DL    ALT (SGPT) 19 13 - 56 U/L    AST (SGOT) 12 10 - 38 U/L    Alk. phosphatase 63 45 - 117 U/L    Protein, total 7.0 6.4 - 8.2 g/dL    Albumin 3.4 3.4 - 5.0 g/dL    Globulin 3.6 2.0 - 4.0 g/dL    A-G Ratio 0.9 0.8 - 1.7     CARDIAC PANEL,(CK, CKMB & TROPONIN)    Collection Time: 06/21/21  2:38 PM   Result Value Ref Range    CK - MB <1.0 <3.6 ng/ml    CK-MB Index  0.0 - 4.0 %     CALCULATION NOT PERFORMED WHEN RESULT IS BELOW LINEAR LIMIT    CK 25 (L) 26 - 192 U/L    Troponin-I, QT <0.02 0.0 - 0.045 NG/ML     EKG interpreted for myself at 1411 hrs. reveals normal sinus rhythm, rate 97. Normal intervals and axis. No ST segment elevations. Impression nonischemic EKG. MDM  Number of Diagnoses or Management Options  Malaise and fatigue  Syncope, unspecified syncope type  Diagnosis management comments: Impression: Generalized malaise and fatigue. Unclear etiology. Screening labs including troponin, liver functions, electrolytes, CBC all within normal limits. EKG unremarkable. No focal weakness. After screening assessment and lab review patient appears stable for outpatient follow-up and management. Procedures    Diagnosis:   1. Malaise and fatigue    2.  Syncope, unspecified syncope type          Disposition: home    Follow-up Information     Follow up With Specialties Details Why Contact Info    Mata Thurston MD Family Medicine In 3 days for recheck of ongoing symptoms 1 [facility] [address] [phone] [id number] [facility] [place] [place] EMERGENCY DEPT Emergency Medicine  As needed, If symptoms worsen [address]  [phone]          Patient's Medications   Start Taking    No medications on file   Continue Taking    ATORVASTATIN (LIPITOR) 20 MG TABLET    TAKE 1 TABLET BY MOUTH AT  NIGHT    BLOOD-GLUCOSE METER (TRUE METRIX AIR GLUCOSE METER) MONITORING KIT    Dx: E11.9    CALCIUM-CHOLECALCIFEROL, D3, (CALCIUM 500 + D) 500 MG(1,250MG) -400 UNIT TAB    Take 1 Tab by mouth two (2) times a day. CHOLECALCIFEROL (VITAMIN D3) 2,000 UNIT CAP CAPSULE    Take 2,000 Units by mouth daily. COQ10, UBIQUINOL, PO    Take  by mouth. GLUCOSE BLOOD VI TEST STRIPS (TRUE METRIX GLUCOSE TEST STRIP) STRIP    Test blood sugar 1 hour prior to breakfast and 1 hour after dinner DX E11.9    LANCETS 33 GAUGE MISC    TEST BLOOD SUGAR AS DIRECTED    LOSARTAN (COZAAR) 25 MG TABLET    TAKE 1 TABLET BY MOUTH  DAILY    METFORMIN (GLUCOPHAGE) 1,000 MG TABLET    TAKE 1 TABLET BY MOUTH  TWICE DAILY WITH MEALS    VIT C/E/ZN/COPPR/LUTEIN/ZEAXAN (PRESERVISION AREDS 2 PO)    Take  by mouth. These Medications have changed    No medications on file   Stop Taking    DENOSUMAB (PROLIA) 60 MG/ML INJECTION    1 mL by SubCUTAneous route every 6 months. To be done at [facility].

## 2021-06-22 ENCOUNTER — PATIENT OUTREACH (OUTPATIENT)
Dept: CASE MANAGEMENT | Age: 76
End: 2021-06-22

## 2021-06-22 NOTE — PROGRESS NOTES
Complex Case Management      Date/Time:  2021 3:06 PM    Method of communication with patient: phone    Formerly named [facility] contacted the Patient by telephone to perform Ambulatory Care Coordination. Verified name and  with Patient as identifiers. Provided introduction to self, and explanation of the Ambulatory Care Manager's role. Reviewed most recent clinic visit w/ Patient who verbalized understanding. Patient given an opportunity to ask questions. Top Challenges reviewed with the patient   1. ED visit on 21 for severe malaise, fatigue and syncope     Patient reports that she feels the same today as she did when she presented to the ED. States she feels extremely fatigued. Denies any further episodes of syncope. Patient denies cp, sob, dizziness, n/v, fever, diarrhea, constipation, urinary frequency or retention. Patient repots her blood sugars have been ranging from 105-130s. Admits to taking all medications as prescribed and denies needing any refills at this time. Patient has appointment with PCP on 21. She is aware of appointment date and time. The Patient agrees to contact the PCP office or the [address] [place] [address] for questions related to their healthcare. Provided contact information for future reference. Disease Specific:   N/A    Home Health Active: No     DME Active: No     Barriers to care? None identified at this outreach    Advance Care Planning:   Does patient have an Advance Directive:  not on file     Medication(s):   Medication reconciliation was performed with patient, who verbalizes understanding of administration of home medications. There were no barriers to obtaining medications identified at this time.     Referral to Pharm D needed: no     Current Outpatient Medications   Medication Sig    atorvastatin (LIPITOR) 20 mg tablet TAKE 1 TABLET BY MOUTH AT  NIGHT    losartan (COZAAR) 25 mg tablet TAKE 1 TABLET BY MOUTH  DAILY    metFORMIN (GLUCOPHAGE) 1,000 mg tablet TAKE 1 TABLET BY MOUTH  TWICE DAILY WITH MEALS    Blood-Glucose Meter (True Metrix Air Glucose Meter) monitoring kit Dx: E11.9    glucose blood VI test strips (True Metrix Glucose Test Strip) strip Test blood sugar 1 hour prior to breakfast and 1 hour after dinner DX E11.9    lancets 33 gauge misc TEST BLOOD SUGAR AS DIRECTED    cholecalciferol (VITAMIN D3) 2,000 unit cap capsule Take 2,000 Units by mouth daily.  VIT C/E/ZN/COPPR/LUTEIN/ZEAXAN (PRESERVISION AREDS 2 PO) Take  by mouth.  COQ10, UBIQUINOL, PO Take  by mouth.  Calcium-Cholecalciferol, D3, (CALCIUM 500 + D) 500 mg(1,250mg) -400 unit tab Take 1 Tab by mouth two (2) times a day. No current facility-administered medications for this visit. [facility] follow up appointment(s):   Future Appointments   Date Time Provider Hermann Yao   6/28/2021 11:45 AM Matthias Hackett MD FP [facility]        Non-[facility] follow up appointment(s): n/a    Goals      Attend follow up appointments on schedule      6/22/21 Upcoming appointments were reviewed with patient. Patient verbalized acknowledgement.  Takes Medications as prescribed      6/21/22 Medications were reviewed and reconciled with patient. Patient reports taking all medications as prescribed and denies needing any refills at this time.             [phone]

## 2021-06-28 ENCOUNTER — OFFICE VISIT (OUTPATIENT)
Dept: FAMILY MEDICINE CLINIC | Age: 76
End: 2021-06-28
Payer: MEDICARE

## 2021-06-28 VITALS
HEIGHT: 64 IN | TEMPERATURE: 98.5 F | HEART RATE: 89 BPM | BODY MASS INDEX: 19.29 KG/M2 | DIASTOLIC BLOOD PRESSURE: 64 MMHG | OXYGEN SATURATION: 99 % | RESPIRATION RATE: 16 BRPM | WEIGHT: 113 LBS | SYSTOLIC BLOOD PRESSURE: 126 MMHG

## 2021-06-28 DIAGNOSIS — E11.9 WELL CONTROLLED DIABETES MELLITUS (HCC): ICD-10-CM

## 2021-06-28 DIAGNOSIS — R55 SYNCOPE, UNSPECIFIED SYNCOPE TYPE: ICD-10-CM

## 2021-06-28 DIAGNOSIS — R53.83 OTHER FATIGUE: Primary | ICD-10-CM

## 2021-06-28 PROCEDURE — G8752 SYS BP LESS 140: HCPCS | Performed by: FAMILY MEDICINE

## 2021-06-28 PROCEDURE — G8420 CALC BMI NORM PARAMETERS: HCPCS | Performed by: FAMILY MEDICINE

## 2021-06-28 PROCEDURE — 1090F PRES/ABSN URINE INCON ASSESS: CPT | Performed by: FAMILY MEDICINE

## 2021-06-28 PROCEDURE — 99213 OFFICE O/P EST LOW 20 MIN: CPT | Performed by: FAMILY MEDICINE

## 2021-06-28 PROCEDURE — G8427 DOCREV CUR MEDS BY ELIG CLIN: HCPCS | Performed by: FAMILY MEDICINE

## 2021-06-28 PROCEDURE — G8536 NO DOC ELDER MAL SCRN: HCPCS | Performed by: FAMILY MEDICINE

## 2021-06-28 PROCEDURE — G8754 DIAS BP LESS 90: HCPCS | Performed by: FAMILY MEDICINE

## 2021-06-28 PROCEDURE — G8432 DEP SCR NOT DOC, RNG: HCPCS | Performed by: FAMILY MEDICINE

## 2021-06-28 PROCEDURE — 1101F PT FALLS ASSESS-DOCD LE1/YR: CPT | Performed by: FAMILY MEDICINE

## 2021-06-28 NOTE — PROGRESS NOTES
Chief Complaint   Patient presents with    Fatigue     says has more energy but still not back to normal   [facility] DINO

## 2021-06-28 NOTE — PROGRESS NOTES
HISTORY OF PRESENT ILLNESS  Bradley Goff is a 67 y.o. female. HPI: Here for follow-up after emergency room visit. She was feeling extremely fatigued and tired so she decided to go to the emergency room. States she has fainted twice last week. Said probably for 2 to 3 minutes. She lives with a roommate. No seizure-like activity or loss of urine or bowel control. No extremity weakness. No trouble ambulation. She does not recall how she fell. She had a dental work done last month. Since then she was having hard time eating and drinking. She was taking Ensure drinks. Currently also not eating on time. Said when she passed out that the roommate has checked her blood sugar and blood pressure. Both were okay. She does not recall the numbers. Review emergency room visit in the chart. First set of cardiac enzymes negative. EKG showed no acute finding. Chest x-ray negative. CBC and CMP fairly stable except mildly elevated creatinine and anemia. During visit she is sitting comfortable without any acute distress. Still feeling fatigued but better than before. No headache or dizziness. No chest pain or shortness of breath. No palpitation or diaphoresis. No anxiety or depression. No nausea vomiting or abdominal pain. No urinary or bowel complaint. Has history of diabetes. Last A1c around 5.8. Glipizide was discontinued and she was having hard time eating but still taking Metformin. I have advised her to stop Metformin and keep the blood pressure and blood sugar log at home. Any worsening of symptoms or any recurrent episode of passing out she should go to emergency room. Agreed to see the neurologist for further evaluation.   Visit Vitals  /64 (BP 1 Location: Left arm, BP Patient Position: Sitting, BP Cuff Size: Adult)   Pulse 89   Temp 98.5 °F (36.9 °C) (Oral)   Resp 16   Ht 5' 4\" (1.626 m)   Wt 113 lb (51.3 kg)   SpO2 99%   BMI 19.40 kg/m²     Review medication list, vitals, problem list,allergies. Lab Results   Component Value Date/Time    CK 25 (L) 06/21/2021 02:38 PM    CK - MB <1.0 06/21/2021 02:38 PM    CK-MB Index  06/21/2021 02:38 PM     CALCULATION NOT PERFORMED WHEN RESULT IS BELOW LINEAR LIMIT    Troponin-I, QT <0.02 06/21/2021 02:38 PM     Lab Results   Component Value Date/Time    WBC 6.0 06/21/2021 02:38 PM    Hemoglobin, POC 11.9 (L) 03/02/2018 10:38 AM    HGB 11.8 (L) 06/21/2021 02:38 PM    Hematocrit, POC 35 (L) 03/02/2018 10:38 AM    HCT 34.3 (L) 06/21/2021 02:38 PM    PLATELET 305 26/67/4506 02:38 PM    MCV 85.5 06/21/2021 02:38 PM     Lab Results   Component Value Date/Time    Sodium 138 06/21/2021 02:38 PM    Potassium 3.4 (L) 06/21/2021 02:38 PM    Chloride 103 06/21/2021 02:38 PM    CO2 27 06/21/2021 02:38 PM    Anion gap 8 06/21/2021 02:38 PM    Glucose 132 (H) 06/21/2021 02:38 PM    BUN 30 (H) 06/21/2021 02:38 PM    Creatinine 1.08 06/21/2021 02:38 PM    BUN/Creatinine ratio 28 (H) 06/21/2021 02:38 PM    GFR est AA 60 (L) 06/21/2021 02:38 PM    GFR est non-AA 49 (L) 06/21/2021 02:38 PM    Calcium 9.1 06/21/2021 02:38 PM    Bilirubin, total 0.7 06/21/2021 02:38 PM    Alk. phosphatase 63 06/21/2021 02:38 PM    Protein, total 7.0 06/21/2021 02:38 PM    Albumin 3.4 06/21/2021 02:38 PM    Globulin 3.6 06/21/2021 02:38 PM    A-G Ratio 0.9 06/21/2021 02:38 PM    ALT (SGPT) 19 06/21/2021 02:38 PM    AST (SGOT) 12 06/21/2021 02:38 PM       Lab Results   Component Value Date/Time    TSH 3.63 02/06/2017 10:19 AM         ROS: See HPI    Physical Exam  Cardiovascular:      Rate and Rhythm: Normal rate. Heart sounds: No murmur heard. Pulmonary:      Effort: Pulmonary effort is normal. No respiratory distress. Breath sounds: No wheezing. Musculoskeletal:         General: No swelling. Cervical back: Neck supple. Neurological:      General: No focal deficit present. Mental Status: She is alert and oriented to person, place, and time.    Psychiatric: Behavior: Behavior normal.         ASSESSMENT and PLAN    ICD-10-CM ICD-9-CM    1. Other fatigue: Improved compared to before but still present. All symptoms started since her dental procedures as she was not able to eat much. I have advised her now to eat and drink on time. Stop Metformin. Keep the blood sugar and blood pressure log at home. Follow-up next visit or sooner if needed R53.83 780.79    2. Well controlled diabetes mellitus ([facility] [address]  E11.9 250.00 TSH 3RD GENERATION      MICROALBUMIN, UR, RAND W/ MICROALB/CREAT RATIO      LIPID PANEL    hold off metfromin     3. Syncope, unspecified syncope type: For 2 to 3 minutes. She does not recall how she fell. Lately been not eating fairly due to dental procedure. I have advised her to stop Metformin. Keep blood pressure and blood sugar log at home. Sending to neurology. Any recurrent episode go to emergency room. EKG no acute finding. Chest x-ray no acute finding. Cardiac enzymes negative R55 780.2 REFERRAL TO NEUROLOGY   Patient understood agree with the plan    Follow-up and Dispositions    · Return in about 1 month (around 7/28/2021). Please note that this dictation was completed with [employer], the computer voice recognition software. Quite often unanticipated grammatical, syntax, homophones, and other interpretive errors are inadvertently transcribed by the computer software. Please disregard these errors. Please excuse any errors that have escaped final proofreading.

## 2021-06-28 NOTE — PATIENT INSTRUCTIONS
Eating Healthy Foods: Care Instructions  Your Care Instructions     Eating healthy foods can help lower your risk for disease. Healthy food gives you energy and keeps your heart strong, your brain active, your muscles working, and your bones strong. A healthy diet includes a variety of foods from the basic food groups: grains, vegetables, fruits, milk and milk products, and meat and beans. Some people may eat more of their favorite foods from only one food group and, as a result, miss getting the nutrients they need. So, it is important to pay attention not only to what you eat but also to what you are missing from your diet. You can eat a healthy, balanced diet by making a few small changes. Follow-up care is a key part of your treatment and safety. Be sure to make and go to all appointments, and call your doctor if you are having problems. It's also a good idea to know your test results and keep a list of the medicines you take. How can you care for yourself at home? Look at what you eat  · Keep a food diary for a week or two and record everything you eat or drink. Track the number of servings you eat from each food group. · For a balanced diet every day, eat a variety of:  ? 6 or more ounce-equivalents of grains, such as cereals, breads, crackers, rice, or pasta, every day. An ounce-equivalent is 1 slice of bread, 1 cup of ready-to-eat cereal, or ½ cup of cooked rice, cooked pasta, or cooked cereal.  ? 2½ cups of vegetables, especially:  § Dark-green vegetables such as broccoli and spinach. § Orange vegetables such as carrots and sweet potatoes. § Dry beans (such as conte and kidney beans) and peas (such as lentils). ? 2 cups of fresh, frozen, or canned fruit. A small apple or 1 banana or orange equals 1 cup. ? 3 cups of nonfat or low-fat milk, yogurt, or other milk products. ? 5½ ounces of meat and beans, such as chicken, fish, lean meat, beans, nuts, and seeds.  One egg, 1 tablespoon of peanut butter, ½ ounce nuts or seeds, or ¼ cup of cooked beans equals 1 ounce of meat. · Learn how to read food labels for serving sizes and ingredients. Fast-food and convenience-food meals often contain few or no fruits or vegetables. Make sure you eat some fruits and vegetables to make the meal more nutritious. · Look at your food diary. For each food group, add up what you have eaten and then divide the total by the number of days. This will give you an idea of how much you are eating from each food group. See if you can find some ways to change your diet to make it more healthy. Start small  · Do not try to make dramatic changes to your diet all at once. You might feel that you are missing out on your favorite foods and then be more likely to fail. · Start slowly, and gradually change your habits. Try some of the following:  ? Use whole wheat bread instead of white bread. ? Use nonfat or low-fat milk instead of whole milk. ? Eat brown rice instead of white rice, and eat whole wheat pasta instead of white-flour pasta. ? Try low-fat cheeses and low-fat yogurt. ? Add more fruits and vegetables to meals and have them for snacks. ? Add lettuce, tomato, cucumber, and onion to sandwiches. ? Add fruit to yogurt and cereal.  Enjoy food  · You can still eat your favorite foods. You just may need to eat less of them. If your favorite foods are high in fat, salt, and sugar, limit how often you eat them, but do not cut them out entirely. · Eat a wide variety of foods. Make healthy choices when eating out  · The type of restaurant you choose can help you make healthy choices. Even fast-food chains are now offering more low-fat or healthier choices on the menu. · Choose smaller portions, or take half of your meal home. · When eating out, try:  ? A veggie pizza with a whole wheat crust or grilled chicken (instead of sausage or pepperoni).   ? Pasta with roasted vegetables, grilled chicken, or marinara sauce instead of cream sauce. ? A vegetable wrap or grilled chicken wrap. ? Broiled or poached food instead of fried or breaded items. Make healthy choices easy  · Buy packaged, prewashed, ready-to-eat fresh vegetables and fruits, such as baby carrots, salad mixes, and chopped or shredded broccoli and cauliflower. · Buy packaged, presliced fruits, such as melon or pineapple. · Choose 100% fruit or vegetable juice instead of soda. Limit juice intake to 4 to 6 oz (½ to ¾ cup) a day. · Blend low-fat yogurt, fruit juice, and canned or frozen fruit to make a smoothie for breakfast or a snack. Where can you learn more? Go to http://www.crump.com/  Enter T756 in the search box to learn more about \"Eating Healthy Foods: Care Instructions. \"  Current as of: December 17, 2020               Content Version: 12.8  © 2006-2021 [employer]. Care instructions adapted under license by [employer] (which disclaims liability or warranty for this information). If you have questions about a medical condition or this instruction, always ask your healthcare professional. [place] [address] any warranty or liability for your use of this information. Nutrition Tips for Diabetes: After Your Visit  Your Care Instructions  A healthy diet is important to manage diabetes. It helps you lose weight (if you need to) and keep it off. It gives you the nutrition and energy your body needs and helps prevent heart disease. But a diet for diabetes does not mean that you have to eat special foods. You can eat what your family eats, including occasional sweets and other favorites. But you do have to pay attention to how often you eat and how much you eat of certain foods. The right plan for you will give you meals that help you keep your blood sugar at healthy levels. Try to eat a variety of foods and to spread carbohydrate throughout the day.  Carbohydrate raises blood sugar higher and more quickly than any other nutrient does. Carbohydrate is found in sugar, breads and cereals, fruit, starchy vegetables such as potatoes and corn, and milk and yogurt. You may want to work with a dietitian or diabetes educator to help you plan meals and snacks. A dietitian or diabetes educator also can help you lose weight if that is one of your goals. The following tips can help you enjoy your meals and stay healthy. Follow-up care is a key part of your treatment and safety. Be sure to make and go to all appointments, and call your doctor if you are having problems. Its also a good idea to know your test results and keep a list of the medicines you take. How can you care for yourself at home? · Learn which foods have carbohydrate and how much carbohydrate to eat. A dietitian or diabetes educator can help you learn to keep track of how much carbohydrate you eat. · Spread carbohydrate throughout the day. Eat some carbohydrate at all meals, but do not eat too much at any one time. · Plan meals to include food from all the food groups. These are the food groups and some example portion sizes:  ¨ Grains: 1 slice of bread (1 ounce), ½ cup of cooked cereal, and 1/3 cup of cooked pasta or rice. These have about 15 grams of carbohydrate in a serving. Choose whole grains such as whole wheat bread or crackers, oatmeal, and brown rice more often than refined grains. ¨ Fruit: 1 small fresh fruit, such as an apple or orange; ½ of a banana; ½ cup of chopped, cooked, or canned fruit; ½ cup of fruit juice; 1 cup of melon or raspberries; and 2 tablespoons of dried fruit. These have about 15 grams of carbohydrate in a serving. ¨ Dairy: 1 cup of nonfat or low-fat milk and 2/3 cup of plain yogurt. These have about 15 grams of carbohydrate in a serving. ¨ Protein foods: Beef, chicken, turkey, fish, eggs, tofu, cheese, cottage cheese, and peanut butter. A serving size of meat is 3 ounces, which is about the size of a deck of cards. Examples of meat substitute serving sizes (equal to 1 ounce of meat) are 1/4 cup of cottage cheese, 1 egg, 1 tablespoon of peanut butter, and ½ cup of tofu. These have very little or no carbohydrate per serving. ¨ Vegetables: Starchy vegetables such as ½ cup of cooked dried beans, peas, potatoes, or corn have about 15 grams of carbohydrate. Nonstarchy vegetables have very little carbohydrate, such as 1 cup of raw leafy vegetables (such as spinach), ½ cup of other vegetables (cooked or chopped), and 3/4 cup of vegetable juice. · Use the plate format to plan meals. It is a good, quick way to make sure that you have a balanced meal. It also helps you spread carbohydrate throughout the day. You divide your plate by types of foods. Put vegetables on half the plate, meat or meat substitutes on one-quarter of the plate, and a grain or starchy vegetable (such as brown rice or a potato) in the final quarter of the plate. To this you can add a small piece of fruit and 1 cup of milk or yogurt, depending on how much carbohydrate you are supposed to eat at a meal.  · Talk to your dietitian or diabetes educator about ways to add limited amounts of sweets into your meal plan. You can eat these foods now and then, as long as you include the amount of carbohydrate they have in your daily carbohydrate allowance. · If you drink alcohol, limit it to no more than 1 drink a day for women and 2 drinks a day for men. If you are pregnant, no amount of alcohol is known to be safe. · Protein, fat, and fiber do not raise blood sugar as much as carbohydrate does. If you eat a lot of these nutrients in a meal, your blood sugar will rise more slowly than it would otherwise. · Limit saturated fats, such as those from meat and dairy products. Try to replace it with monounsaturated fat, such as olive oil. This is a healthier choice because people who have diabetes are at higher-than-average risk of heart disease.  But use a modest amount of olive oil. A tablespoon of olive oil has 14 grams of fat and 120 calories. · Exercise lowers blood sugar. If you take insulin by shots or pump, you can use less than you would if you were not exercising. Keep in mind that timing matters. If you exercise within 1 hour after a meal, your body may need less insulin for that meal than it would if you exercised 3 hours after the meal. Test your blood sugar to find out how exercise affects your need for insulin. · Exercise on most days of the week. Aim for at least 30 minutes. Exercise helps you stay at a healthy weight and helps your body use insulin. Walking is an easy way to get exercise. Gradually increase the amount you walk every day. You also may want to swim, bike, or do other activities. When you eat out  · Learn to estimate the serving sizes of foods that have carbohydrate. If you measure food at home, it will be easier to estimate the amount in a serving of restaurant food. · If the meal you order has too much carbohydrate (such as potatoes, corn, or baked beans), ask to have a low-carbohydrate food instead. Ask for a salad or green vegetables. · If you use insulin, check your blood sugar before and after eating out to help you plan how much to eat in the future. · If you eat more carbohydrate at a meal than you had planned, take a walk or do other exercise. This will help lower your blood sugar. Where can you learn more? Go to [employer]  Enter O995 in the search box to learn more about \"Nutrition Tips for Diabetes: After Your Visit. \"   © 3909-2425 Healthwise[employer]. Care instructions adapted under license by [facility] (which disclaims liability or warranty for this information).  This care instruction is for use with your licensed healthcare professional. If you have questions about a medical condition or this instruction, always ask your healthcare professional. [employer] disclaims any warranty or liability for your use of this information. Content Version: 46.8.378203; Current as of: June 4, 2014                 Fainting: Care Instructions  Your Care Instructions     When you faint, or pass out, you lose consciousness for a short time. A brief drop in blood flow to the brain often causes it. When you fall or lie down, more blood flows to your brain and you regain consciousness. Emotional stress, pain, or overheating--especially if you have been standing--can make you faint. In these cases, fainting is usually not serious. But fainting can be a sign of a more serious problem. Your doctor may want you to have more tests to rule out other causes. The treatment you need depends on the reason why you fainted. The doctor has checked you carefully, but problems can develop later. If you notice any problems or new symptoms, get medical treatment right away. Follow-up care is a key part of your treatment and safety. Be sure to make and go to all appointments, and call your doctor if you are having problems. It's also a good idea to know your test results and keep a list of the medicines you take. How can you care for yourself at home? · Drink plenty of fluids to prevent dehydration. If you have kidney, heart, or liver disease and have to limit fluids, talk with your doctor before you increase your fluid intake. When should you call for help? Call 911 anytime you think you may need emergency care. For example, call if:    · You have symptoms of a heart problem. These may include:  ? Chest pain or pressure. ? Severe trouble breathing. ? A fast or irregular heartbeat. ? Lightheadedness or sudden weakness. ? Coughing up pink, foamy mucus. ? Passing out. After you call 911, the  may tell you to chew 1 adult-strength or 2 to 4 low-dose aspirin. Wait for an ambulance. Do not try to drive yourself.     · You have symptoms of a stroke.  These may include:  ? Sudden numbness, tingling, weakness, or loss of movement in your face, arm, or leg, especially on only one side of your body. ? Sudden vision changes. ? Sudden trouble speaking. ? Sudden confusion or trouble understanding simple statements. ? Sudden problems with walking or balance. ? A sudden, severe headache that is different from past headaches.     · You passed out (lost consciousness) again. Watch closely for changes in your health, and be sure to contact your doctor if:    · You do not get better as expected. Where can you learn more? Go to http://www.gray.com/  Enter A848 in the search box to learn more about \"Fainting: Care Instructions. \"  Current as of: February 26, 2020               Content Version: 12.8  © 2006-2021 Healthwise, [employer]. Care instructions adapted under license by [employer] (which disclaims liability or warranty for this information). If you have questions about a medical condition or this instruction, always ask your healthcare professional. [place] [address] any warranty or liability for your use of this information.

## 2021-06-29 ENCOUNTER — PATIENT OUTREACH (OUTPATIENT)
Dept: CASE MANAGEMENT | Age: 76
End: 2021-06-29

## 2021-07-06 ENCOUNTER — PATIENT OUTREACH (OUTPATIENT)
Dept: CASE MANAGEMENT | Age: 76
End: 2021-07-06

## 2021-07-22 ENCOUNTER — PATIENT OUTREACH (OUTPATIENT)
Dept: CASE MANAGEMENT | Age: 76
End: 2021-07-22

## 2021-08-06 ENCOUNTER — PATIENT OUTREACH (OUTPATIENT)
Dept: CASE MANAGEMENT | Age: 76
End: 2021-08-06

## 2021-08-26 ENCOUNTER — PATIENT OUTREACH (OUTPATIENT)
Dept: CASE MANAGEMENT | Age: 76
End: 2021-08-26

## 2021-08-26 NOTE — PROGRESS NOTES
Contacted patient for Complex Case Management  follow up. No answer. Left message introducing self, role and reason for call. Requested return call. Contact information provided. Third unsuccessful attempt to contact patient. Episode resolved.

## 2022-01-28 ENCOUNTER — OFFICE VISIT (OUTPATIENT)
Dept: NEUROLOGY | Age: 77
End: 2022-01-28
Payer: MEDICARE

## 2022-01-28 VITALS
HEIGHT: 64 IN | WEIGHT: 123 LBS | SYSTOLIC BLOOD PRESSURE: 126 MMHG | BODY MASS INDEX: 21 KG/M2 | OXYGEN SATURATION: 98 % | DIASTOLIC BLOOD PRESSURE: 70 MMHG | RESPIRATION RATE: 16 BRPM | HEART RATE: 97 BPM

## 2022-01-28 DIAGNOSIS — R55 SYNCOPE, UNSPECIFIED SYNCOPE TYPE: Primary | ICD-10-CM

## 2022-01-28 PROCEDURE — G8420 CALC BMI NORM PARAMETERS: HCPCS | Performed by: NURSE PRACTITIONER

## 2022-01-28 PROCEDURE — G8536 NO DOC ELDER MAL SCRN: HCPCS | Performed by: NURSE PRACTITIONER

## 2022-01-28 PROCEDURE — G8427 DOCREV CUR MEDS BY ELIG CLIN: HCPCS | Performed by: NURSE PRACTITIONER

## 2022-01-28 PROCEDURE — G8754 DIAS BP LESS 90: HCPCS | Performed by: NURSE PRACTITIONER

## 2022-01-28 PROCEDURE — G8432 DEP SCR NOT DOC, RNG: HCPCS | Performed by: NURSE PRACTITIONER

## 2022-01-28 PROCEDURE — 99204 OFFICE O/P NEW MOD 45 MIN: CPT | Performed by: NURSE PRACTITIONER

## 2022-01-28 PROCEDURE — 1090F PRES/ABSN URINE INCON ASSESS: CPT | Performed by: NURSE PRACTITIONER

## 2022-01-28 PROCEDURE — 1101F PT FALLS ASSESS-DOCD LE1/YR: CPT | Performed by: NURSE PRACTITIONER

## 2022-01-28 PROCEDURE — G8752 SYS BP LESS 140: HCPCS | Performed by: NURSE PRACTITIONER

## 2022-01-28 NOTE — PROGRESS NOTES
Jessica Paris presents today for   Chief Complaint   Patient presents with    Dizziness     Syncope    New Patient       Is someone accompanying this pt? no    Is the patient using any DME equipment during OV? no    Depression Screening:  3 most recent PHQ Screens 6/8/2021   Little interest or pleasure in doing things Not at all   Feeling down, depressed, irritable, or hopeless Not at all   Total Score PHQ 2 0       Learning Assessment:  Learning Assessment 1/24/2019   PRIMARY LEARNER Patient   HIGHEST LEVEL OF EDUCATION - PRIMARY LEARNER  -   BARRIERS PRIMARY LEARNER NONE   CO-LEARNER CAREGIVER No   PRIMARY LANGUAGE ENGLISH   LEARNER PREFERENCE PRIMARY READING     -     -     -   ANSWERED BY patient   RELATIONSHIP SELF       Abuse Screening:  Abuse Screening Questionnaire 10/2/2020   Do you ever feel afraid of your partner? N   Are you in a relationship with someone who physically or mentally threatens you? N   Is it safe for you to go home? Y       Fall Risk  Fall Risk Assessment, last 12 mths 1/28/2022   Able to walk? Yes   Fall in past 12 months? 1   Do you feel unsteady? 0   Are you worried about falling 0   Is TUG test greater than 12 seconds? 0   Is the gait abnormal? 0   Number of falls in past 12 months 1   Fall with injury? 0         Coordination of Care:  1. Have you been to the ER, urgent care clinic since your last visit? Hospitalized since your last visit? no    2. Have you seen or consulted any other health care providers outside of the [address] [place] [address] since your last visit? Include any pap smears or colon screening.  no

## 2022-01-28 NOTE — PROGRESS NOTES
[facility]  [address] [facility], [address], [place], [address]  [address] [place], [address]  Office:  [phone]  Fax: [phone]    Referring: Bao Lancaster MD    Chief Complaint   Patient presents with    Dizziness     Syncope    New Patient       HPI: This pleasant 57-year-old female who presents for new patient visit with chief complaint of loss of consciousness. Significant history of diabetes. Referred by her primary care provider. Onset of symptoms began in June of 2021. She said around the time she was getting dental work. She was taking Tylenol arthritis for oral pain. She was outside weeding when she experienced first passing out incident. Denies warning signs or associated symptoms. Said passing out was brief. The next day she fell going down the stairs. She said she put a hole in the wall. Her roommate was home and heard this. She recalls speaking to her roommate. She said that same day she was sitting on the toilet having a bowel movement when she passed out. Her roommate's grandson was there knocking on the door asking if she was ok. Eb Heap this was brief again. No warning signs. No reported biting her tongue or incontinence with any of the episodes. Denies shortness of breath, diaphoresis, palpitations, or chest pain. Denies visual disturbance including blurred vision or loss of vision. Denies lightheadedness, headache, or dizziness. Denies history of seizures or family history of seizures. Denies history of similar episodes. She is diabetic and reports having normal blood sugars around this time. She said that she stopped taking the Tylenol arthritis and thinks something and this might have affected her because these episodes. Has not had any subsequent loss of consciousness. Has not woken up on the floor unsure how she got there, bit her tongue, or lost her water in the middle the night in the interim.     She tells me of living in [address] [place] [address] for some time and they have codeine in her aspirin. She said she did not tolerate this in the past.  Denies the medication she took had codeine incorporated. Per documentation review she did present to the emergency room on June 21, 2021 for malaise and weakness. Denies having head imaging. She has seen her primary care provider in the interim. No additional concerns at this time. Social History     Socioeconomic History    Marital status:      Spouse name: Not on file    Number of children: Not on file    Years of education: Not on file    Highest education level: Not on file   Occupational History    Not on file   Tobacco Use    Smoking status: Never Smoker    Smokeless tobacco: Never Used   Substance and Sexual Activity    Alcohol use: No    Drug use: No    Sexual activity: Not Currently   Other Topics Concern    Not on file   Social History Narrative    Not on file     Social Determinants of Health     Financial Resource Strain:     Difficulty of Paying Living Expenses: Not on file   Food Insecurity:     Worried About Running Out of Food in the Last Year: Not on file    Zhanna of Food in the Last Year: Not on file   Transportation Needs:     Lack of Transportation (Medical): Not on file    Lack of Transportation (Non-Medical):  Not on file   Physical Activity:     Days of Exercise per Week: Not on file    Minutes of Exercise per Session: Not on file   Stress:     Feeling of Stress : Not on file   Social Connections:     Frequency of Communication with Friends and Family: Not on file    Frequency of Social Gatherings with Friends and Family: Not on file    Attends Judaism Services: Not on file    Active Member of Clubs or Organizations: Not on file    Attends Club or Organization Meetings: Not on file    Marital Status: Not on file   Intimate Partner Violence:     Fear of Current or Ex-Partner: Not on file    Emotionally Abused: Not on file    Physically Abused: Not on file    Sexually Abused: Not on file   Housing Stability:     Unable to Pay for Housing in the Last Year: Not on file    Number of Places Lived in the Last Year: Not on file    Unstable Housing in the Last Year: Not on file       Family History   Problem Relation Age of Onset    Heart Disease Mother     Heart Disease Father     Other Brother         brain aneurysm       Current Outpatient Medications   Medication Sig Dispense Refill    metformin HCl (METFORMIN PO) Take  by mouth.  GLIPIZIDE PO Take  by mouth.  atorvastatin (LIPITOR) 20 mg tablet TAKE 1 TABLET BY MOUTH AT  NIGHT 90 Tablet 0    losartan (COZAAR) 25 mg tablet TAKE 1 TABLET BY MOUTH  DAILY 90 Tablet 0    Blood-Glucose Meter (True Metrix Air Glucose Meter) monitoring kit Dx: E11.9 1 Kit 0    glucose blood VI test strips (True Metrix Glucose Test Strip) strip Test blood sugar 1 hour prior to breakfast and 1 hour after dinner DX E11.9 100 Strip 0    lancets 33 gauge misc TEST BLOOD SUGAR AS DIRECTED 300 Lancet 0    cholecalciferol (VITAMIN D3) 2,000 unit cap capsule Take 2,000 Units by mouth daily. 30 Cap 5    VIT C/E/ZN/COPPR/LUTEIN/ZEAXAN (PRESERVISION AREDS 2 PO) Take  by mouth.  COQ10, UBIQUINOL, PO Take  by mouth.  Calcium-Cholecalciferol, D3, (CALCIUM 500 + D) 500 mg(1,250mg) -400 unit tab Take 1 Tab by mouth two (2) times a day.  (Patient not taking: Reported on 1/28/2022) 180 Tab 4       Past Medical History:   Diagnosis Date    Cataract 04/2017    bilateral     Diabetes ([address]     Diabetic retinopathy of both eyes ([address] 04/2017    Hypertension     Macular degeneration 9/2015    Osteoporosis 09/2015    on Prolia starting 2/2017       Past Surgical History:   Procedure Laterality Date    COLONOSCOPY N/A 7/19/2017    COLONOSCOPY performed by Sarah Stovall MD at [address] [place] [address]  12/2016    right eye       Allergies   Allergen Reactions    Aspralum [Aspirin, Buffered] Other (comments)     Causes abdominal cramping    Aspirin Other (comments)     Upset stomach- can cause bleeding ulcer    Codeine Other (comments)     Makes her loopy       Patient Active Problem List   Diagnosis Code    Essential hypertension I10    Osteoporosis without current pathological fracture M81.0    Type II diabetes mellitus ([facility] E11.9    Macular degeneration H35.30    Diabetic retinopathy of both eyes ([facility] E11.319    Diabetic macular edema ([facility] E11.311    Type 2 diabetes mellitus with proliferative retinopathy ([facility] E11.3599         Review of Systems:   Constitutional: no fever or chills  Skin denies rash or itching  HEENT:  Denies tinnitus, hearing loss, or visual changes  Respiratory: denies shortness of breath  Cardiovascular: denies chest pain, dyspnea on exertion  Gastrointestinal: does not report nausea or vomiting  Genitourinary: does not report dysuria or incontinence  Musculoskeletal: does not report joint pain or swelling  Endocrine: denies weight change  Hematology: denies easy bruising or bleeding   Neurological: as above in HPI      PHYSICAL EXAMINATION:      VITAL SIGNS:    Visit Vitals  /70   Pulse 97   Resp 16   Ht 5' 4\" (1.626 m)   Wt 55.8 kg (123 lb)   SpO2 98%   BMI 21.11 kg/m²       GENERAL: Well developed, well nourished, in no apparent distress. HEART: RR, no murmurs heard, no carotid bruits  LUNGS:                      CTAB  EXTREMITIES: No clubbing, cyanosis, or edema is identified. Pulses 2+ and symmetrical.  HEAD:   Normocephalic, atraumatic. NEUROLOGIC EXAMINATION    MENTAL STATUS: Awake, alert, and oriented x 4. Attention and STM are grossly normal. There is no aphasia. Fund of knowledge is adequate. Mood and affect are appropriate  CRANIAL NERVES: Visual fields are full to confrontation. No fundus anomalies observed. Pupils are reactive to light and accommodation. Extraocular movements are intact and there is no nystagmus.    Facial sensation is normal  Face is symmetrical.   Hearing is grossly intact. SCM/TPZ 5/5  Palate rises symmetrically. Tongue is in the midline. MOTOR:   Normal tone, bulk, and strength, 5/5 muscle strength throughout. No cogwheel rigidity or clonus present. CEREBELLAR: Finger to nose was normal.   No tremors or dysmetria    SENSORY:  Normal PP, vibration, propioception. Romberg negative    DTR's:   +2 throughout, toes downgoing     GAIT:   Normal gait, able to tandem walk        Impression/Plan  Safia Walker is a 67 y.o. female whose history and physical are consistent with history of syncopal episodes of unclear etiology with significant history of diabetes and risk factors including none. Patient presents for evaluation of syncopal episodes. Episodes occurred in June 2021. Around that time she had dental work and per documentation review she was not eating regularly secondary to pain. She also reports being on Tylenol arthritis at that time. Patient feels this somehow contributed to episodes. Diabetes well controlled at 5.6%. Reports unremarkable blood glucose levels around that time. Denies associated symptoms such as cardiac, visual, headache, or sensory disturbances. Denies subsequent episodes over the past 7 months. Does not have history of seizures, stroke, or heart attack. Her blood pressure is well controlled at 126/70 in office today. One of the episodes likely had a vasovagal etiology as she does endorse having a bowel movement at that time. In any regard, it has been an extended period of time was symptoms free and we certainly could move forward with evaluation but overall patient is feeling well. We will have her continue to monitor symptoms and if she does have another symptomatic episode she will call the office and we will move forward with customary evaluation. Patient is agreeable with plan of care. All questions addressed and patient is agreeable with plan of care.     Diagnoses and all orders for this visit:    1. Syncope, unspecified syncope type        I spent 45 minutes with the patient in face-to-face consultation, with 37 minutes spent in counseling and coordination of care as described above. Signed By: Lakia Alvarez NP              PLEASE NOTE:   Portions of this document may have been produced using voice recognition software. Unrecognized errors in transcription may be present.

## 2022-03-02 RX ORDER — ATORVASTATIN CALCIUM 20 MG/1
TABLET, FILM COATED ORAL
Qty: 90 TABLET | Refills: 3 | OUTPATIENT
Start: 2022-03-02

## 2022-03-02 NOTE — LETTER
3/10/2022 10:41 AM    Ms[address] [place] [address] [phone]      We have been trying to reach you without success regarding prescription refills requests we have received from your pharmacy. From review of your medical record your last visit was June of 2021. At this visit Dr. Raven Shea ordered lab work and requested a follow-up in 1 month. To date, the labs have not been complotted and there has been no follow-up appointment. Please contact the office to let us know if you are continuing your care with Dr. Raven Shea or have you changed Primary Care Physicians. Our phone number is [phone]- please press option 3 and asked to speak with Rani Ventura LPN. We look forward to hearing from you!         Sincerely,      Reagan Chance MD

## 2022-03-02 NOTE — TELEPHONE ENCOUNTER
Called patient and left message to please call office at earliest convenience regarding refill  requests

## 2022-03-02 NOTE — TELEPHONE ENCOUNTER
Getting multiple times medication refill request. I have refused it as no appt scheduled. Please confirm am I PCP??   If yes then pt need to schedule an appt

## 2022-03-10 NOTE — TELEPHONE ENCOUNTER
Called patient and left message to please call office at earliest convenience regarding Rx requests and PCP.  Sending letter

## 2022-03-18 PROBLEM — E11.3599 TYPE 2 DIABETES MELLITUS WITH PROLIFERATIVE RETINOPATHY (HCC): Status: ACTIVE | Noted: 2020-10-02

## 2022-03-18 NOTE — LETTER
3/21/2022 1:01 PM    [address] [place] [place] [phone]      We have bee trying to reach you regarding multiple requests we are receiving from the pharmacy. Your last office visit was June of 2021. We do as that our patients be seen on a regular basis, especially when we are prescribing medications. Please contact our office to either update your medical records with a different Primary Care Provider or to schedule a follow-up in our office.          Sincerely,      Anne Gill MD

## 2022-03-19 PROBLEM — E11.319 DIABETIC RETINOPATHY OF BOTH EYES (HCC): Status: ACTIVE | Noted: 2017-04-01

## 2022-03-19 PROBLEM — E11.311 DIABETIC MACULAR EDEMA (HCC): Status: ACTIVE | Noted: 2018-10-03

## 2022-03-21 RX ORDER — ATORVASTATIN CALCIUM 20 MG/1
TABLET, FILM COATED ORAL
Qty: 90 TABLET | Refills: 3 | OUTPATIENT
Start: 2022-03-21

## 2022-03-21 NOTE — TELEPHONE ENCOUNTER
I have sent multiple messages with refill request that pt need a follow up appt to obtain refill. Rejecting her prescriptions. Please reach out, AM I PCP??      Tina Lala

## 2022-03-21 NOTE — TELEPHONE ENCOUNTER
Called patient again and left voicemail to request call back to update medical records to reflect her proper PCP.  Sending letter to please contact the office

## 2022-06-08 ENCOUNTER — TELEPHONE (OUTPATIENT)
Dept: PHARMACY | Age: 77
End: 2022-06-08

## 2022-06-08 NOTE — TELEPHONE ENCOUNTER
[facility] CLINICAL PHARMACY: ADHERENCE REVIEW  Identified care gap per [employer]: fills at [facility]: Statin adherence    Last Visit: 06/28/2022    Patient identified as LIS = 1, therefore patient may be able to receive a 90-day supply for the same cost as a 30-day supply    Patient also appears to be prescribed:   Metformin  Glipizide  Losartan    Patient not found in [facility]    [address] Records claims through 05/01/2022 (2021 [place] = n/a%; YTD Gildardo Cruz = Filled only once; Potential Fail Date: 06/17/2022): Atorvastatin 20mg last filled on 01/06/2022 for 90 day supply. Next refill due: 04/06/2022    Per [facility] Portal:   Atorvastatin 20mg last filled on 01/06/2022 for 90 day supply. Per [facility]:   Atorvastatin 20mg last picked up on 01/07/2022 for 90 day supply. 0 refills remaining. Billed through [employer] [facility] Value Date/Time    Cholesterol, total 183 08/02/2019 12:00 AM    HDL Cholesterol 37 (L) 08/02/2019 12:00 AM    LDL, calculated 77 08/02/2019 12:00 AM    VLDL, calculated 69 (H) 08/02/2019 12:00 AM    Triglyceride 344 (H) 08/02/2019 12:00 AM    CHOL/HDL Ratio 6.1 (H) 01/02/2019 10:48 AM     ALT (SGPT)   Date Value Ref Range Status   06/21/2021 19 13 - 56 U/L Final     AST (SGOT)   Date Value Ref Range Status   06/21/2021 12 10 - 38 U/L Final     The 10-year ASCVD risk score (Wilma Johnson., et al., 2013) is: 44.6%    Values used to calculate the score:      Age: 65 years      Sex: Female      Is Non- : No      Diabetic: Yes      Tobacco smoker: No      Systolic Blood Pressure: 193 mmHg      Is BP treated: Yes      HDL Cholesterol: 37 mg/dL      Total Cholesterol: 183 mg/dL     PLAN  The following are interventions that have been identified:  - Patient overdue refilling Atorvastatin 20mg and active on home medication list     MD will not refill medications until patient makes an appt to be seen.      Unable to leave message Call disconnected after 3 rings. Sending letter for outreach      No future appointments.     Lucia Winston  Direct: [phone]    For Pharmacy Admin Tracking Only     CPA in place: No   Gap Closed?: No   Time Spent (min): 20      Department, toll free:[phone], Option 2

## 2022-06-08 NOTE — LETTER
[address]  [address], [address]      Kathryn Rashid   [address] [facility] [address]         06/08/22     Dear Kathryn Rashid,      We tried to reach you recently regarding your Atorvastatin 40mg, but were unable to reach you on the telephone. It appears that this medication has not been filled at proper times. We are worried you might be missing doses or not taking it as directed. It is important that you take your medications regularly and try not to miss a single dose. Please contact your doctor and make an appointment as soon as possible. You will not be able to get medications refilled until you have been seen by your provider. We have on file that you are currently taking Atorvastatin 40mg once daily. If you are no longer taking it or taking it differently than above, please call us at the number below so that we can discuss this and update your medication profile.     Some ways to help you remember to take and refill your medications are to:  · Use a pill box, set an alarm, and/or keep your medication near something that you do every day  · Fill a 3-month supply of your prescription at a time to save you time and trips to the pharmacy  if you would like assistance in switching your prescriptions to a 3-month supply, please contact us  · Ask your pharmacy if they participate in [facility]", a program where you can  all of your medications on the same day each month  · Ask your pharmacy if you can be set up with automatic refill, where they will automatically refill your prescription when it is due and let you know it's ready to     Sincerely,     [address]  Phone: [phone], Option 2

## (undated) DEVICE — FLUFF AND POLYMER UNDERPAD,EXTRA HEAVY: Brand: WINGS

## (undated) DEVICE — MEDI-VAC SUCTION HIGH CAPACITY: Brand: CARDINAL HEALTH

## (undated) DEVICE — SOLUTION IRRIG 1000ML H2O STRL BLT

## (undated) DEVICE — (D)SYR 10ML 1/5ML GRAD NSAF -- PKGING CHANGE USE ITEM 338027

## (undated) DEVICE — FLEX ADVANTAGE 3000CC: Brand: FLEX ADVANTAGE

## (undated) DEVICE — CATHETER SUCT TR FL TIP 14FR W/ O CTRL

## (undated) DEVICE — ENDOSCOPY PUMP TUBING/ CAP SET: Brand: ERBE

## (undated) DEVICE — MEDI-VAC NON-CONDUCTIVE SUCTION TUBING: Brand: CARDINAL HEALTH

## (undated) DEVICE — (D)GLOVE EXAM LG NITRL NS -- DISC BY MFR NO SUB

## (undated) DEVICE — GAUZE SPONGES,16 PLY: Brand: CURITY

## (undated) DEVICE — CATHETER THOR 36FR DIA10.7MM POLYVI CHL TRCR TIP STR SFT

## (undated) DEVICE — GOWN ISOL IMPERV UNIV, DISP, OPEN BACK, BLUE --

## (undated) DEVICE — SYR 50ML SLIP TIP NSAF LF STRL --

## (undated) DEVICE — AIRLIFE™ NASAL OXYGEN CANNULA CURVED, NONFLARED TIP WITH 14 FOOT (4.3 M) CRUSH-RESISTANT TUBING, OVER-THE-EAR STYLE: Brand: AIRLIFE™

## (undated) DEVICE — SYRINGE MED 25GA 3ML L5/8IN SUBQ PLAS W/ DETACH NDL SFTY

## (undated) DEVICE — SYRINGE MED 20ML STD CLR PLAS LUERLOCK TIP N CTRL DISP

## (undated) DEVICE — CANNULA ORIG TL CLR W FOAM CUSHIONS AND 14FT SUPL TB 3 CHN